# Patient Record
Sex: MALE | Race: WHITE | NOT HISPANIC OR LATINO | Employment: FULL TIME | URBAN - METROPOLITAN AREA
[De-identification: names, ages, dates, MRNs, and addresses within clinical notes are randomized per-mention and may not be internally consistent; named-entity substitution may affect disease eponyms.]

---

## 2017-02-13 ENCOUNTER — OFFICE VISIT (OUTPATIENT)
Dept: FAMILY MEDICINE | Facility: CLINIC | Age: 37
End: 2017-02-13
Payer: COMMERCIAL

## 2017-02-13 VITALS
BODY MASS INDEX: 27.57 KG/M2 | HEIGHT: 73 IN | TEMPERATURE: 98.8 F | DIASTOLIC BLOOD PRESSURE: 86 MMHG | HEART RATE: 94 BPM | WEIGHT: 208 LBS | SYSTOLIC BLOOD PRESSURE: 120 MMHG

## 2017-02-13 DIAGNOSIS — F32.1 MODERATE SINGLE CURRENT EPISODE OF MAJOR DEPRESSIVE DISORDER (H): ICD-10-CM

## 2017-02-13 DIAGNOSIS — M62.81 MUSCLE WEAKNESS: Primary | ICD-10-CM

## 2017-02-13 LAB
BASOPHILS # BLD AUTO: 0 10E9/L (ref 0–0.2)
BASOPHILS NFR BLD AUTO: 0.2 %
DIFFERENTIAL METHOD BLD: NORMAL
EOSINOPHIL # BLD AUTO: 0.1 10E9/L (ref 0–0.7)
EOSINOPHIL NFR BLD AUTO: 1.2 %
ERYTHROCYTE [DISTWIDTH] IN BLOOD BY AUTOMATED COUNT: 12.1 % (ref 10–15)
HCT VFR BLD AUTO: 46.1 % (ref 40–53)
HGB BLD-MCNC: 16.7 G/DL (ref 13.3–17.7)
LYMPHOCYTES # BLD AUTO: 1.8 10E9/L (ref 0.8–5.3)
LYMPHOCYTES NFR BLD AUTO: 32 %
MCH RBC QN AUTO: 30.9 PG (ref 26.5–33)
MCHC RBC AUTO-ENTMCNC: 36.2 G/DL (ref 31.5–36.5)
MCV RBC AUTO: 85 FL (ref 78–100)
MONOCYTES # BLD AUTO: 0.4 10E9/L (ref 0–1.3)
MONOCYTES NFR BLD AUTO: 6.7 %
NEUTROPHILS # BLD AUTO: 3.4 10E9/L (ref 1.6–8.3)
NEUTROPHILS NFR BLD AUTO: 59.9 %
PLATELET # BLD AUTO: 222 10E9/L (ref 150–450)
RBC # BLD AUTO: 5.41 10E12/L (ref 4.4–5.9)
VIT B12 SERPL-MCNC: 905 PG/ML (ref 193–986)
WBC # BLD AUTO: 5.7 10E9/L (ref 4–11)

## 2017-02-13 PROCEDURE — 82306 VITAMIN D 25 HYDROXY: CPT | Performed by: PHYSICIAN ASSISTANT

## 2017-02-13 PROCEDURE — 82607 VITAMIN B-12: CPT | Performed by: PHYSICIAN ASSISTANT

## 2017-02-13 PROCEDURE — 99213 OFFICE O/P EST LOW 20 MIN: CPT | Performed by: PHYSICIAN ASSISTANT

## 2017-02-13 PROCEDURE — 36415 COLL VENOUS BLD VENIPUNCTURE: CPT | Performed by: PHYSICIAN ASSISTANT

## 2017-02-13 PROCEDURE — 82550 ASSAY OF CK (CPK): CPT | Performed by: PHYSICIAN ASSISTANT

## 2017-02-13 PROCEDURE — 80050 GENERAL HEALTH PANEL: CPT | Performed by: PHYSICIAN ASSISTANT

## 2017-02-13 NOTE — MR AVS SNAPSHOT
"              After Visit Summary   2017    Rosendo Dallas    MRN: [id number]           Patient Information     Date Of Birth          1980        Visit Information        Provider Department      2017 11:20 AM Anand Mcclendon PA-C [facility]        Today's Diagnoses     Muscle weakness    -  1       Follow-ups after your visit        Follow-up notes from your care team     Return if symptoms worsen or fail to improve.      Who to contact     If you have questions or need follow up information about today's clinic visit or your schedule please contact [facility] directly at [phone].  Normal or non-critical lab and imaging results will be communicated to you by [employer], letter or phone within 4 business days after the clinic has received the results. If you do not hear from us within 7 days, please contact the clinic through Prov[employer] or phone. If you have a critical or abnormal lab result, we will notify you by phone as soon as possible.  Submit refill requests through [employer] or call your pharmacy and they will forward the refill request to us. Please allow 3 business days for your refill to be completed.          Additional Information About Your Visit        MyChart Information     [employer] lets you send messages to your doctor, view your test results, renew your prescriptions, schedule appointments and more. To sign up, go to [email or web address] . Click on \"Log in\" on the left side of the screen, which will take you to the Welcome page. Then click on \"Sign up Now\" on the right side of the page.     You will be asked to enter the access code listed below, as well as some personal information. Please follow the directions to create your username and password.     Your access code is: [email or web address]  Expires: 2017 12:43 PM     Your access code will  in 90 days. If you need help or a new code, please call your [facility] or " "[phone].        Care EveryWhere ID     This is your Care EveryWhere ID. This could be used by other organizations to access your [place] medical records  [id number]        Your Vitals Were     Pulse Temperature Height BMI (Body Mass Index)          94 98.8  F (37.1  C) (Tympanic) 6' 1\" (1.854 m) 27.44 kg/m2         Blood Pressure from Last 3 Encounters:   02/13/17 120/86   07/07/16 120/82   06/20/16 124/84    Weight from Last 3 Encounters:   02/13/17 208 lb (94.3 kg)   07/07/16 207 lb 8 oz (94.1 kg)   06/20/16 213 lb (96.6 kg)              We Performed the Following     CBC with platelets differential     CK total     Comprehensive metabolic panel (BMP + Alb, Alk Phos, ALT, AST, Total. Bili, TP)     TSH with free T4 reflex     Vitamin B12        Primary Care Provider    None Specified       No primary provider on file.        Thank you!     Thank you for choosing [facility]  for your care. Our goal is always to provide you with excellent care. Hearing back from our patients is one way we can continue to improve our services. Please take a few minutes to complete the written survey that you may receive in the mail after your visit with us. Thank you!             Your Updated Medication List - Protect others around you: Learn how to safely use, store and throw away your medicines at www.disposemymeds.org.          This list is accurate as of: 2/13/17 12:43 PM.  Always use your most recent med list.                   Brand Name Dispense Instructions for use    omeprazole 10 MG CR capsule    priLOSEC    60 capsule    Take by mouth 30-60 minutes before a meal.         "

## 2017-02-13 NOTE — NURSING NOTE
"Chief Complaint   Patient presents with     Decreased Co-ordination     x 1 week     Initial /86 (BP Location: Right arm, Patient Position: Chair, Cuff Size: Adult Large)  Pulse 94  Temp 98.8  F (37.1  C) (Tympanic)  Ht 6' 1\" (1.854 m)  Wt 208 lb (94.3 kg)  BMI 27.44 kg/m2 Estimated body mass index is 27.44 kg/(m^2) as calculated from the following:    Height as of this encounter: 6' 1\" (1.854 m).    Weight as of this encounter: 208 lb (94.3 kg).  BP completed using cuff size large right Arm  [facility] CMA    "

## 2017-02-13 NOTE — PROGRESS NOTES
"  SUBJECTIVE:                                                    Rosendo Dallas is a 36 year old male who presents to clinic today for the following health issues:    Muscle/Motor function problem x 1 week - Motor skills more difficult, muscles in his hands and arms feel \"stiff\" and \" weak\" bilaterally. He describes that it feels difficult to grasp with his hands.  He has some muscle pain in his bilateral LE and some tingling bilaterally in his 4th and 5th toes.  This has been ongoing x 1 week.  No new changes to medications, no recent illnesses, drug ETOH or vaccinations. No recent travel.  He has no headaches, fatigue, changes in vision.  No other symptoms.              Problem list and histories reviewed & adjusted, as indicated.  Additional history: as documented    Patient Active Problem List   Diagnosis   (none) - all problems resolved or deleted     History reviewed. No pertinent past surgical history.    Social History   Substance Use Topics     Smoking status: Never Smoker     Smokeless tobacco: Not on file     Alcohol use Yes      Comment: occassional     History reviewed. No pertinent family history.      Current Outpatient Prescriptions   Medication Sig Dispense Refill     omeprazole (PRILOSEC) 10 MG capsule Take by mouth 30-60 minutes before a meal. 60 capsule      No Known Allergies    ROS:  Constitutional, HEENT, cardiovascular, pulmonary, gi and gu systems are negative, except as otherwise noted.    OBJECTIVE:                                                    /86 (BP Location: Right arm, Patient Position: Chair, Cuff Size: Adult Large)  Pulse 94  Temp 98.8  F (37.1  C) (Tympanic)  Ht 6' 1\" (1.854 m)  Wt 208 lb (94.3 kg)  BMI 27.44 kg/m2  Body mass index is 27.44 kg/(m^2).  GENERAL: healthy, alert and no distress  EYES: Eyes grossly normal to inspection, PERRL and conjunctivae and sclerae normal, EOMI  NECK: no adenopathy, no asymmetry, masses, or scars and thyroid normal to palpation  RESP: " lungs clear to auscultation - no rales, rhonchi or wheezes  CV: regular rate and rhythm, normal S1 S2, no S3 or S4, no murmur  MS: no gross musculoskeletal defects noted, no edema  NEURO: Normal strength and tone, mentation intact and speech normal, FROM and 5/5 strength of UE bilaterally, finger to nose, heel to shin and Bebeto intact bilaterally  PSYCH: mentation appears normal, affect normal/bright    Diagnostic Test Results:  none      ASSESSMENT/PLAN:                                                      1. Muscle weakness  Unclear etiology, neuro examination is intact today, will begin with labs to assess etiology, may consider imaging or neuro eval pending results.  - TSH with free T4 reflex  - Vitamin B12  - Comprehensive metabolic panel (BMP + Alb, Alk Phos, ALT, AST, Total. Bili, TP)  - CBC with platelets differential  - CK total    See Patient Instructions    Anand Mcclendon PA-C  [facility]

## 2017-02-14 ENCOUNTER — TELEPHONE (OUTPATIENT)
Dept: FAMILY MEDICINE | Facility: CLINIC | Age: 37
End: 2017-02-14

## 2017-02-14 LAB
ALBUMIN SERPL-MCNC: 4.5 G/DL (ref 3.4–5)
ALP SERPL-CCNC: 70 U/L (ref 40–150)
ALT SERPL W P-5'-P-CCNC: 53 U/L (ref 0–70)
ANION GAP SERPL CALCULATED.3IONS-SCNC: 11 MMOL/L (ref 3–14)
AST SERPL W P-5'-P-CCNC: 26 U/L (ref 0–45)
BILIRUB SERPL-MCNC: 1.3 MG/DL (ref 0.2–1.3)
BUN SERPL-MCNC: 16 MG/DL (ref 7–30)
CALCIUM SERPL-MCNC: 9.3 MG/DL (ref 8.5–10.1)
CHLORIDE SERPL-SCNC: 102 MMOL/L (ref 94–109)
CK SERPL-CCNC: 126 U/L (ref 30–300)
CO2 SERPL-SCNC: 26 MMOL/L (ref 20–32)
CREAT SERPL-MCNC: 0.89 MG/DL (ref 0.66–1.25)
GFR SERPL CREATININE-BSD FRML MDRD: ABNORMAL ML/MIN/1.7M2
GLUCOSE SERPL-MCNC: 104 MG/DL (ref 70–99)
POTASSIUM SERPL-SCNC: 3.9 MMOL/L (ref 3.4–5.3)
PROT SERPL-MCNC: 7.6 G/DL (ref 6.8–8.8)
SODIUM SERPL-SCNC: 139 MMOL/L (ref 133–144)
TSH SERPL DL<=0.005 MIU/L-ACNC: 0.95 MU/L (ref 0.4–4)

## 2017-02-14 NOTE — TELEPHONE ENCOUNTER
Spoke with patient and informed of below. Patient verbalized understanding and agrees with plan.    Marisa Holbrook RN   [facility] - Triage

## 2017-02-14 NOTE — TELEPHONE ENCOUNTER
Please call patient and inform him of normal lab results ( below).  The lab results did not reveal a specific cause to his symptoms.  At this time, Advise that he monitor his symptoms and if continuing , will consider neurology referral vs. Hand therapy.  Please let me know if he has any questions

## 2017-02-16 ENCOUNTER — OFFICE VISIT (OUTPATIENT)
Dept: FAMILY MEDICINE | Facility: CLINIC | Age: 37
End: 2017-02-16
Payer: COMMERCIAL

## 2017-02-16 ENCOUNTER — TELEPHONE (OUTPATIENT)
Dept: FAMILY MEDICINE | Facility: CLINIC | Age: 37
End: 2017-02-16

## 2017-02-16 VITALS
TEMPERATURE: 97.8 F | WEIGHT: 204.3 LBS | HEART RATE: 65 BPM | OXYGEN SATURATION: 100 % | SYSTOLIC BLOOD PRESSURE: 114 MMHG | DIASTOLIC BLOOD PRESSURE: 88 MMHG | BODY MASS INDEX: 26.95 KG/M2

## 2017-02-16 DIAGNOSIS — F41.9 ANXIETY: Primary | ICD-10-CM

## 2017-02-16 DIAGNOSIS — M79.10 MUSCLE PAIN: ICD-10-CM

## 2017-02-16 DIAGNOSIS — E55.9 VITAMIN D DEFICIENCY: ICD-10-CM

## 2017-02-16 DIAGNOSIS — F32.1 MODERATE SINGLE CURRENT EPISODE OF MAJOR DEPRESSIVE DISORDER (H): ICD-10-CM

## 2017-02-16 LAB — DEPRECATED CALCIDIOL+CALCIFEROL SERPL-MC: 15 UG/L (ref 20–75)

## 2017-02-16 PROCEDURE — 99214 OFFICE O/P EST MOD 30 MIN: CPT | Performed by: FAMILY MEDICINE

## 2017-02-16 RX ORDER — ESCITALOPRAM OXALATE 10 MG/1
10 TABLET ORAL DAILY
Qty: 30 TABLET | Refills: 1 | Status: SHIPPED | OUTPATIENT
Start: 2017-02-16 | End: 2017-03-13

## 2017-02-16 RX ORDER — HYDROXYZINE HYDROCHLORIDE 25 MG/1
25-50 TABLET, FILM COATED ORAL
Qty: 60 TABLET | Refills: 1 | Status: SHIPPED | OUTPATIENT
Start: 2017-02-16 | End: 2017-03-13

## 2017-02-16 ASSESSMENT — ANXIETY QUESTIONNAIRES
2. NOT BEING ABLE TO STOP OR CONTROL WORRYING: NEARLY EVERY DAY
5. BEING SO RESTLESS THAT IT IS HARD TO SIT STILL: NEARLY EVERY DAY
GAD7 TOTAL SCORE: 21
IF YOU CHECKED OFF ANY PROBLEMS ON THIS QUESTIONNAIRE, HOW DIFFICULT HAVE THESE PROBLEMS MADE IT FOR YOU TO DO YOUR WORK, TAKE CARE OF THINGS AT HOME, OR GET ALONG WITH OTHER PEOPLE: EXTREMELY DIFFICULT
7. FEELING AFRAID AS IF SOMETHING AWFUL MIGHT HAPPEN: NEARLY EVERY DAY
3. WORRYING TOO MUCH ABOUT DIFFERENT THINGS: NEARLY EVERY DAY
6. BECOMING EASILY ANNOYED OR IRRITABLE: NEARLY EVERY DAY
1. FEELING NERVOUS, ANXIOUS, OR ON EDGE: NEARLY EVERY DAY

## 2017-02-16 ASSESSMENT — PATIENT HEALTH QUESTIONNAIRE - PHQ9: 5. POOR APPETITE OR OVEREATING: NEARLY EVERY DAY

## 2017-02-16 NOTE — PATIENT INSTRUCTIONS
Begin Lexapro 10 mg daily in AM  Hydroxyzine 25-50 mg every 4-6 hours as needed for anxiety.  I would recommend you take at night to assist with sleep until sleeping well.      Follow up in 4 weeks for recheck of symptoms, sooner if worsening symptoms.  If any of the muscle symptoms remain after the anxiety symptoms are resolved we will discuss additional evaluation.

## 2017-02-16 NOTE — NURSING NOTE
"Chief Complaint   Patient presents with     Anxiety       Initial /88 (BP Location: Right arm, Cuff Size: Adult Large)  Pulse 65  Temp 97.8  F (36.6  C) (Oral)  Wt 92.7 kg (204 lb 4.8 oz)  SpO2 100%  BMI 26.95 kg/m2 Estimated body mass index is 26.95 kg/(m^2) as calculated from the following:    Height as of 2/13/17: 1.854 m (6' 1\").    Weight as of this encounter: 92.7 kg (204 lb 4.8 oz).  Medication Reconciliation: complete     Jhon Waller CMA      "

## 2017-02-16 NOTE — PROGRESS NOTES
Looks like this was ordered by you today at follow up with this patient. Let me know if you would like me to follow up with him regarding this

## 2017-02-16 NOTE — PROGRESS NOTES
SUBJECTIVE:                                                    Rosendo Dallas is a 36 year old male who presents to clinic today for the following health issues:      Abnormal Mood Symptoms     Onset: 2 week  - work stress increased.      Description:   Depression: no   Anxiety: YES    Accompanying Signs & Symptoms:  Still participating in activities that you used to enjoy: YES, trying to as much as possible  Fatigue: YES  Irritability: YES  Difficulty concentrating: YES  Changes in appetite: YES- not eating enough - decreased appetite  Problems with sleep: YES- disturbances in sleep  Heart racing/beating fast : YES - during anxiety episodes.    Thoughts of hurting yourself or others: none     History:   Recent stress: YES- work and health  Prior depression hospitalization: None  Family history of depression: YES  Family history of anxiety: YES      Precipitating factors:   Alcohol/drug use: YES- alcohol socially    Alleviating factors:  None       Therapies Tried and outcome: None    Hand muscle stiffness - texting, remote control painful.  Tightness in arms, calves.  Very anxious.      No joint swelling.  No weakness.  Aching present constantly upper arms.    Sleep poor in last week.  Hard to stay asleep.    headache on Tues/Wed but not since then        Problem list and histories reviewed & adjusted, as indicated.  Additional history: as documented    Problem list, Medication list, Allergies, and Medical/Social/Surgical histories reviewed in EPIC and updated as appropriate.    ROS:  Constitutional, HEENT, cardiovascular, pulmonary, gi and gu systems are negative, except as otherwise noted.    OBJECTIVE:                                                    /88 (BP Location: Right arm, Cuff Size: Adult Large)  Pulse 65  Temp 97.8  F (36.6  C) (Oral)  Wt 92.7 kg (204 lb 4.8 oz)  SpO2 100%  BMI 26.95 kg/m2  Body mass index is 26.95 kg/(m^2).  GENERAL: alert, no distress and fatigued  EYES: Eyes grossly normal to  inspection, PERRL and conjunctivae and sclerae normal  HENT: ear canals and TM's normal, nose and mouth without ulcers or lesions  NECK: no adenopathy, no asymmetry, masses, or scars and thyroid normal to palpation  RESP: lungs clear to auscultation - no rales, rhonchi or wheezes  CV: regular rate and rhythm, normal S1 S2, no S3 or S4, no murmur, click or rub, no peripheral edema and peripheral pulses strong  ABDOMEN: soft, nontender, no hepatosplenomegaly, no masses and bowel sounds normal  MS: no gross musculoskeletal defects noted, no edema  SKIN: no suspicious lesions or rashes  NEURO: Normal strength and tone, sensory exam grossly normal, mentation intact, speech normal, cranial nerves 2-12 intact, DTR's normal and symmetric, gait normal including heel/toe/tandem walking, Romberg normal and rapid alternating movements normal  PSYCH: mentation appears normal, affect flat, anxious, fatigued, judgement and insight intact and appearance well groomed    Component Results   Component Value Flag Ref Range Units Status Collected Lab   Vitamin D Deficiency screening 15 (L) 20 - 75 ug/L Final 02/13/2017 11:48 AM 51   Comment:   Season, race, dietary intake, and treatment affect the concentration of    25-hydroxy-Vitamin D. Values may decrease during winter months and increase    during summer months. Values 20-29 ug/L may indicate Vitamin D insufficiency    and values <20 ug/L may indicate Vitamin D deficiency.    Vitamin D determination is routinely performed by an immunoassay specific for    25 hydroxyvitamin D3.  If an individual is on vitamin D2 (ergocalciferol)    supplementation, please specify 25 OH vitamin D2 and D3 level determination   by    LCMSMS test [id number].             ASSESSMENT/PLAN:                                                        1. Anxiety  May be contributed to by the low vitamin D.  Physical symptoms result in anxiety for him due to fear of health issues. Counseling recommended.    -  escitalopram (LEXAPRO) 10 MG tablet; Take 1 tablet (10 mg) by mouth daily  Dispense: 30 tablet; Refill: 1  - hydrOXYzine (ATARAX) 25 MG tablet; Take 1-2 tablets (25-50 mg) by mouth nightly as needed for anxiety (insomnia)  Dispense: 60 tablet; Refill: 1  - MENTAL HEALTH REFERRAL    2. Moderate single current episode of major depressive disorder (H)  Vitamin D may be contributing.    - escitalopram (LEXAPRO) 10 MG tablet; Take 1 tablet (10 mg) by mouth daily  Dispense: 30 tablet; Refill: 1  - MENTAL HEALTH REFERRAL  - Vitamin D Deficiency; Future    3. Vitamin D deficiency  - vitamin D (ERGOCALCIFEROL) 62448 UNIT capsule; Take 1 capsule (50,000 Units) by mouth every 7 days for 8 doses  Dispense: 8 capsule; Refill: 0    4. Muscle pain  Follow up in 4 weeks to assess response to medication and any residual symptoms.       Patient Instructions   Begin Lexapro 10 mg daily in AM  Hydroxyzine 25-50 mg every 4-6 hours as needed for anxiety.  I would recommend you take at night to assist with sleep until sleeping well.      Follow up in 4 weeks for recheck of symptoms, sooner if worsening symptoms.  If any of the muscle symptoms remain after the anxiety symptoms are resolved we will discuss additional evaluation.      Dianne Gallegos MD  [facility]

## 2017-02-16 NOTE — MR AVS SNAPSHOT
"                  MRN:[id number]                      After Visit Summary   2017    Rosendo Dallas    MRN: [id number]           Visit Information        Provider Department      2017 9:40 AM Dianne Gallegos MD [facility] Instructions    Begin Lexapro 10 mg daily in AM  Hydroxyzine 25-50 mg every 4-6 hours as needed for anxiety.  I would recommend you take at night to assist with sleep until sleeping well.      Follow up in 4 weeks for recheck of symptoms, sooner if worsening symptoms.  If any of the muscle symptoms remain after the anxiety symptoms are resolved we will discuss additional evaluation.       [email or web address] Information     [employer] lets you send messages to your doctor, view your test results, renew your prescriptions, schedule appointments and more. To sign up, go to [email or web address] . Click on \"Log in\" on the left side of the screen, which will take you to the Welcome page. Then click on \"Sign up Now\" on the right side of the page.     You will be asked to enter the access code listed below, as well as some personal information. Please follow the directions to create your username and password.     Your access code is: [email or web address]  Expires: 2017 12:43 PM     Your access code will  in 90 days. If you need help or a new code, please call your [place] clinic or [phone].        Care EveryWhere ID     This is your Care EveryWhere ID. This could be used by other organizations to access your [place] medical records  [id number]        "

## 2017-02-17 ENCOUNTER — TELEPHONE (OUTPATIENT)
Dept: FAMILY MEDICINE | Facility: CLINIC | Age: 37
End: 2017-02-17

## 2017-02-17 DIAGNOSIS — E55.9 VITAMIN D DEFICIENCY: Primary | ICD-10-CM

## 2017-02-17 PROBLEM — M79.10 MUSCLE PAIN: Status: ACTIVE | Noted: 2017-02-17

## 2017-02-17 RX ORDER — ERGOCALCIFEROL 1.25 MG/1
50000 CAPSULE, LIQUID FILLED ORAL
Qty: 8 CAPSULE | Refills: 0 | Status: SHIPPED | OUTPATIENT
Start: 2017-02-17 | End: 2017-04-08

## 2017-02-17 ASSESSMENT — ANXIETY QUESTIONNAIRES: GAD7 TOTAL SCORE: 21

## 2017-02-17 ASSESSMENT — PATIENT HEALTH QUESTIONNAIRE - PHQ9: SUM OF ALL RESPONSES TO PHQ QUESTIONS 1-9: 21

## 2017-02-17 NOTE — TELEPHONE ENCOUNTER
Please call patient re:  Results vitamin D   See letter copied below.          Below you will find your recent laboratory results.  Your vitamin D level is very low.  This can contribute to symptoms of fatigue, depression, and muscle pain.  It is less commonly associated with anxiety but replacement of the vitamin is definitely recommended.    I would recommend an increase in vitamin D supplement to 50,000  IU weekly for 8 weeks and then  2000 IU daily.   The 50,000 IU dosing prescription is sent to your pharmacy.  The 2000 IU dose can be obtained over the counter of as a prescription.  If you desire a prescription, please call the office and this will be sent to your pharmacy.   Recheck of this is needed in 4 months.  Please call or [employer] message me if you have any questions.    Vitamin D Deficiency screening 20 - 75 ug/L 15 (L)     Comments: Season, race, dietary intake, and treatment affect the concentration of    25-hydroxy-Vitamin D. Values may decrease during winter months and increase    during summer months. Values 20-29 ug/L may indicate Vitamin D insufficiency    and values <20 ug/L may indicate Vitamin D deficiency.    Vitamin D determination is routinely performed by an immunoassay specific for    25 hydroxyvitamin D3.  If an individual is on vitamin D2 (ergocalciferol)    supplementation, please specify 25 OH vitamin D2 and D3 level determination   by    LCMSMS test [id number].        Resulting Agency  [facility]          Sincerely,         Dianne Gallegos MD

## 2017-02-17 NOTE — TELEPHONE ENCOUNTER
Patient was informed of test results and recommendations from provider.   Patient verbalized understanding of all information given. Advised to make lab only appt for 4 month recheck.  Jose Marina RN

## 2017-02-17 NOTE — LETTER
[address]  [address]  [phone]    February 17, 2017      Rosendo Dallas  [address]  [place] [address]              Dear Rosendo,      Below you will find your recent laboratory results.  Your vitamin D level is very low.  This can contribute to symptoms of fatigue, depression, and muscle pain.  It is less commonly associated with anxiety but replacement of the vitamin is definitely recommended.  I would recommend an increase in vitamin D supplement to 50,000  IU weekly for 8 weeks and then  2000 IU daily.   The 50,000 IU dosing prescription is sent to your pharmacy.  The 2000 IU dose can be obtained over the counter of as a prescription.  If you desire a prescription, please call the office and this will be sent to your pharmacy.   Recheck of this is needed in 4 months.  Please call or MyChart message me if you have any questions.    Vitamin D Deficiency screening 20 - 75 ug/L 15 (L)     Comments: Season, race, dietary intake, and treatment affect the concentration of    25-hydroxy-Vitamin D. Values may decrease during winter months and increase    during summer months. Values 20-29 ug/L may indicate Vitamin D insufficiency    and values <20 ug/L may indicate Vitamin D deficiency.    Vitamin D determination is routinely performed by an immunoassay specific for    25 hydroxyvitamin D3.  If an individual is on vitamin D2 (ergocalciferol)    supplementation, please specify 25 OH vitamin D2 and D3 level determination   by    LCMSMS test [id number].        Resulting Agency  [facility]          Sincerely,         Dianne Gallegos MD

## 2017-02-20 ENCOUNTER — TELEPHONE (OUTPATIENT)
Dept: FAMILY MEDICINE | Facility: CLINIC | Age: 37
End: 2017-02-20

## 2017-02-20 NOTE — TELEPHONE ENCOUNTER
----- Message from Dianne Gallegos MD sent at 2/17/2017  9:29 AM CST -----  Please call patient re:  New start lexapro for depression/anxiety.  Verify has follow up appointment for 4 weeks.  MYRANDAK

## 2017-02-20 NOTE — TELEPHONE ENCOUNTER
This writer attempted to contact Rosendo on 02/20/17.    Was call answered?  No.  Left message on voicemail with information to call me back.    If patient calls back, please Contact Clinic RN team. If no one available, send encounter message    Amy Neri

## 2017-02-28 ENCOUNTER — MYC MEDICAL ADVICE (OUTPATIENT)
Dept: FAMILY MEDICINE | Facility: CLINIC | Age: 37
End: 2017-02-28

## 2017-02-28 DIAGNOSIS — M79.622 PAIN OF LEFT UPPER ARM: Primary | ICD-10-CM

## 2017-03-06 NOTE — TELEPHONE ENCOUNTER
Please call patient re:  Message returned with [employer] not read.  Okay to leave a message that he has a message on [employer].    MYRANDAK

## 2017-03-06 NOTE — TELEPHONE ENCOUNTER
This writer attempted to contact Rosendo on 03/06/17.    Was call answered?  No.  Left message on voicemail with information to call me back.    If patient calls back, please Contact Clinic RN team. If no one available, send encounter message    Denise Gamboa RN

## 2017-03-07 NOTE — TELEPHONE ENCOUNTER
"Pt has not read the message. Message read by this nurse.  Pt states that the pain is now localized to left hand. \"When I am extending my index finger it feels like it is strained; more like overuse injury.\" Pt would like to wait until his f/u apt.  Miladis Oconnor RN    "

## 2017-03-12 PROBLEM — F32.1 MAJOR DEPRESSIVE DISORDER, SINGLE EPISODE, MODERATE (H): Status: ACTIVE | Noted: 2017-03-12

## 2017-03-12 PROBLEM — F41.9 ANXIETY: Status: ACTIVE | Noted: 2017-03-12

## 2017-03-13 ENCOUNTER — OFFICE VISIT (OUTPATIENT)
Dept: FAMILY MEDICINE | Facility: CLINIC | Age: 37
End: 2017-03-13
Payer: COMMERCIAL

## 2017-03-13 VITALS
HEART RATE: 70 BPM | WEIGHT: 210.4 LBS | OXYGEN SATURATION: 95 % | TEMPERATURE: 98.3 F | SYSTOLIC BLOOD PRESSURE: 124 MMHG | DIASTOLIC BLOOD PRESSURE: 86 MMHG | BODY MASS INDEX: 27.76 KG/M2

## 2017-03-13 DIAGNOSIS — E55.9 VITAMIN D DEFICIENCY: ICD-10-CM

## 2017-03-13 DIAGNOSIS — M79.10 MUSCLE PAIN: ICD-10-CM

## 2017-03-13 DIAGNOSIS — M79.645 PAIN OF FINGER OF LEFT HAND: ICD-10-CM

## 2017-03-13 DIAGNOSIS — F41.9 ANXIETY: Primary | ICD-10-CM

## 2017-03-13 DIAGNOSIS — F32.1 MODERATE SINGLE CURRENT EPISODE OF MAJOR DEPRESSIVE DISORDER (H): ICD-10-CM

## 2017-03-13 PROCEDURE — 99214 OFFICE O/P EST MOD 30 MIN: CPT | Performed by: FAMILY MEDICINE

## 2017-03-13 RX ORDER — ESCITALOPRAM OXALATE 10 MG/1
10 TABLET ORAL DAILY
Qty: 90 TABLET | Refills: 1 | Status: SHIPPED | OUTPATIENT
Start: 2017-03-13 | End: 2017-10-23

## 2017-03-13 ASSESSMENT — ANXIETY QUESTIONNAIRES
GAD7 TOTAL SCORE: 3
3. WORRYING TOO MUCH ABOUT DIFFERENT THINGS: NOT AT ALL
2. NOT BEING ABLE TO STOP OR CONTROL WORRYING: NOT AT ALL
5. BEING SO RESTLESS THAT IT IS HARD TO SIT STILL: SEVERAL DAYS
7. FEELING AFRAID AS IF SOMETHING AWFUL MIGHT HAPPEN: NOT AT ALL
1. FEELING NERVOUS, ANXIOUS, OR ON EDGE: SEVERAL DAYS
6. BECOMING EASILY ANNOYED OR IRRITABLE: NOT AT ALL

## 2017-03-13 ASSESSMENT — PATIENT HEALTH QUESTIONNAIRE - PHQ9: 5. POOR APPETITE OR OVEREATING: SEVERAL DAYS

## 2017-03-13 NOTE — MR AVS SNAPSHOT
After Visit Summary   3/13/2017    Rosendo Dallas    MRN: [id number]           Patient Information     Date Of Birth          1980        Visit Information        Provider Department      3/13/2017 9:20 AM Dianne Gallegos MD [facility]        Today's Diagnoses     Anxiety        Moderate single current episode of major depressive disorder (H)          Care Instructions    Continue lexapro - refills sent.  Follow up in 6 months via Evisit, phone visit or office visit.    Complete the weekly vitamin D then transition to over the counter vitamin D 2000 IU daily          Follow-ups after your visit        Who to contact     If you have questions or need follow up information about today's clinic visit or your schedule please contact [facility] directly at [phone].  Normal or non-critical lab and imaging results will be communicated to you by MyChart, letter or phone within 4 business days after the clinic has received the results. If you do not hear from us within 7 days, please contact the clinic through [facility] or phone. If you have a critical or abnormal lab result, we will notify you by phone as soon as possible.  Submit refill requests through [employer] or call your pharmacy and they will forward the refill request to us. Please allow 3 business days for your refill to be completed.          Additional Information About Your Visit        MyChart Information     F[employer]ored gives you secure access to your electronic health record. If you see a primary care provider, you can also send messages to your care team and make appointments. If you have questions, please call your primary care clinic.  If you do not have a primary care provider, please call [phone] and they will assist you.        Care EveryWhere ID     This is your Care EveryWhere ID. This could be used by other organizations to access your [place] medical records  [id number]        Your Vitals Were      Pulse Temperature Pulse Oximetry BMI (Body Mass Index)          70 98.3  F (36.8  C) (Oral) 95% 27.76 kg/m2         Blood Pressure from Last 3 Encounters:   03/13/17 124/86   02/16/17 114/88   02/13/17 120/86    Weight from Last 3 Encounters:   03/13/17 95.4 kg (210 lb 6.4 oz)   02/16/17 92.7 kg (204 lb 4.8 oz)   02/13/17 94.3 kg (208 lb)              Today, you had the following     No orders found for display         Where to get your medicines      These medications were sent to [id number] - [place], [place] - [address] AT [facility] [address]  [address], [place] [address]     Phone:  [phone]     escitalopram 10 MG tablet          Primary Care Provider Office Phone #    [facility] [phone]       No address on file        Thank you!     Thank you for choosing [facility]  for your care. Our goal is always to provide you with excellent care. Hearing back from our patients is one way we can continue to improve our services. Please take a few minutes to complete the written survey that you may receive in the mail after your visit with us. Thank you!             Your Updated Medication List - Protect others around you: Learn how to safely use, store and throw away your medicines at www.disposemymeds.org.          This list is accurate as of: 3/13/17  9:38 AM.  Always use your most recent med list.                   Brand Name Dispense Instructions for use    escitalopram 10 MG tablet    LEXAPRO    90 tablet    Take 1 tablet (10 mg) by mouth daily       omeprazole 10 MG CR capsule    priLOSEC    60 capsule    Take by mouth 30-60 minutes before a meal.       vitamin D 14767 UNIT capsule    ERGOCALCIFEROL    8 capsule    Take 1 capsule (50,000 Units) by mouth every 7 days for 8 doses

## 2017-03-13 NOTE — NURSING NOTE
"Chief Complaint   Patient presents with     Depression     Anxiety       Initial /86 (BP Location: Right arm, Cuff Size: Adult Large)  Pulse 70  Temp 98.3  F (36.8  C) (Oral)  Wt 95.4 kg (210 lb 6.4 oz)  SpO2 95%  BMI 27.76 kg/m2 Estimated body mass index is 27.76 kg/(m^2) as calculated from the following:    Height as of 2/13/17: 1.854 m (6' 1\").    Weight as of this encounter: 95.4 kg (210 lb 6.4 oz).  Medication Reconciliation: complete       Jhon Waller CMA      "

## 2017-03-13 NOTE — PROGRESS NOTES
SUBJECTIVE:                                                    Rosendo Dallas is a 36 year old male who presents to clinic today for the following health issues:        Depression and Anxiety Follow-Up    Status since last visit: Improved     Other associated symptoms:None    Complicating factors:     Significant life event: No     Current substance abuse: None    PHQ-9 SCORE 2/16/2017 3/13/2017   Total Score 21 2     VIET-7 SCORE 2/16/2017 3/13/2017   Total Score 21 3     Initially poor appetite, nausea.     PHQ-9  English      PHQ-9   Any Language     GAD7       Amount of exercise or physical activity: 6-7 days/week for an average of 45-60 minutes    Problems taking medications regularly: No    Medication side effects: none  Diet: regular (no restrictions)    Muscle pain - improved - resolved with exception of discomfort in the left index finger with full extension. No weakness no numbness.  FROM.  Realized he uses this finger to steady his phone which he uses for work consistently throughout the day.      Problem list and histories reviewed & adjusted, as indicated.  Additional history: as documented    BP Readings from Last 3 Encounters:   03/13/17 124/86   02/16/17 114/88   02/13/17 120/86    Wt Readings from Last 3 Encounters:   03/13/17 95.4 kg (210 lb 6.4 oz)   02/16/17 92.7 kg (204 lb 4.8 oz)   02/13/17 94.3 kg (208 lb)                    Reviewed and updated as needed this visit by clinical staff  Tobacco  Allergies  Meds  Problems  Med Hx  Surg Hx  Fam Hx  Soc Hx        Reviewed and updated as needed this visit by Provider  Tobacco  Allergies  Meds  Problems  Med Hx  Surg Hx  Fam Hx  Soc Hx          ROS:  Constitutional, HEENT, cardiovascular, pulmonary, gi and gu systems are negative, except as otherwise noted.    OBJECTIVE:                                                    /86 (BP Location: Right arm, Cuff Size: Adult Large)  Pulse 70  Temp 98.3  F (36.8  C) (Oral)  Wt 95.4 kg (210  "lb 6.4 oz)  SpO2 95%  BMI 27.76 kg/m2  Body mass index is 27.76 kg/(m^2).  GENERAL: alert, no distress and over weight  NECK: no adenopathy, no asymmetry, masses, or scars and thyroid normal to palpation  RESP: lungs clear to auscultation - no rales, rhonchi or wheezes  CV: regular rate and rhythm, normal S1 S2, no S3 or S4, no murmur, click or rub, no peripheral edema and peripheral pulses strong  MS: LUE exam shows normal strength and muscle mass, no deformities, no erythema, induration, or nodules, no evidence of joint effusion, ROM of all joints is normal, no evidence of joint instability and reports discomfort on full extension only.   SKIN: no suspicious lesions or rashes  PSYCH: mentation appears normal, affect normal/bright, judgement and insight intact and appearance well groomed         ASSESSMENT/PLAN:                                                        BMI:   Estimated body mass index is 27.76 kg/(m^2) as calculated from the following:    Height as of 2/13/17: 1.854 m (6' 1\").    Weight as of this encounter: 95.4 kg (210 lb 6.4 oz).   Weight management plan: Discussed healthy diet and exercise guidelines and patient will follow up in 6 months in clinic to re-evaluate.      1. Anxiety  MUCH improved with the lexapro treatment.  Follow up in 6 months.    - escitalopram (LEXAPRO) 10 MG tablet; Take 1 tablet (10 mg) by mouth daily  Dispense: 90 tablet; Refill: 1    2. Moderate single current episode of major depressive disorder (H)  As above.   - escitalopram (LEXAPRO) 10 MG tablet; Take 1 tablet (10 mg) by mouth daily  Dispense: 90 tablet; Refill: 1    3. Pain of finger of left hand  Appears positional.  No functional limitation.  Will monitor symptoms.     4. Muscle pain  Resolved with current treatment.  Likely related to vitamin D deficiency.    5. Vitamin D deficiency  1 month of high dose treatment completed.  1 additional month high dose then 2000 IU daily.      Patient Instructions   Continue " lexapro - refills sent.  Follow up in 6 months via Evisit, phone visit or office visit.    Complete the weekly vitamin D then transition to over the counter vitamin D 2000 IU daily        Dianne Gallegos MD  [facility]

## 2017-03-13 NOTE — PATIENT INSTRUCTIONS
Continue lexapro - refills sent.  Follow up in 6 months via Evisit, phone visit or office visit.    Complete the weekly vitamin D then transition to over the counter vitamin D 2000 IU daily

## 2017-03-14 ASSESSMENT — PATIENT HEALTH QUESTIONNAIRE - PHQ9: SUM OF ALL RESPONSES TO PHQ QUESTIONS 1-9: 2

## 2017-03-14 ASSESSMENT — ANXIETY QUESTIONNAIRES: GAD7 TOTAL SCORE: 3

## 2017-05-19 ENCOUNTER — OFFICE VISIT (OUTPATIENT)
Dept: FAMILY MEDICINE | Facility: CLINIC | Age: 37
End: 2017-05-19
Payer: COMMERCIAL

## 2017-05-19 VITALS
BODY MASS INDEX: 27.3 KG/M2 | DIASTOLIC BLOOD PRESSURE: 82 MMHG | WEIGHT: 206 LBS | RESPIRATION RATE: 12 BRPM | TEMPERATURE: 98.6 F | HEART RATE: 85 BPM | OXYGEN SATURATION: 99 % | SYSTOLIC BLOOD PRESSURE: 126 MMHG | HEIGHT: 73 IN

## 2017-05-19 DIAGNOSIS — L02.91 ABSCESS: Primary | ICD-10-CM

## 2017-05-19 PROCEDURE — 99213 OFFICE O/P EST LOW 20 MIN: CPT | Performed by: PHYSICIAN ASSISTANT

## 2017-05-19 RX ORDER — CEPHALEXIN 500 MG/1
500 CAPSULE ORAL 4 TIMES DAILY
Qty: 40 CAPSULE | Refills: 0 | Status: SHIPPED | OUTPATIENT
Start: 2017-05-19 | End: 2017-06-01

## 2017-05-19 NOTE — MR AVS SNAPSHOT
After Visit Summary   5/19/2017    Rosendo Dallas    MRN: [id number]           Patient Information     Date Of Birth          1980        Visit Information        Provider Department      5/19/2017 10:40 AM Eliza Patel PA-C [facility]        Today's Diagnoses     Abscess    -  1      Care Instructions    Take 500mg four times a day for 10 days  Follow up with us next week if symptoms not improving.   Return urgently if any change in symptoms like increasing redness, swelling, pain fever or other change in symptoms  Apply moist heat to affected area 15-20 minutes at a time several times a day        Follow-ups after your visit        Who to contact     If you have questions or need follow up information about today's clinic visit or your schedule please contact [facility] directly at [phone].  Normal or non-critical lab and imaging results will be communicated to you by MyChart, letter or phone within 4 business days after the clinic has received the results. If you do not hear from us within 7 days, please contact the clinic through MyChart or phone. If you have a critical or abnormal lab result, we will notify you by phone as soon as possible.  Submit refill requests through [employer] or call your pharmacy and they will forward the refill request to us. Please allow 3 business days for your refill to be completed.          Additional Information About Your Visit        MyChart Information     [employer] gives you secure access to your electronic health record. If you see a primary care provider, you can also send messages to your care team and make appointments. If you have questions, please call your primary care clinic.  If you do not have a primary care provider, please call [phone] and they will assist you.        Care EveryWhere ID     This is your Care EveryWhere ID. This could be used by other organizations to access your [facility]  "records  [id number]        Your Vitals Were     Pulse Temperature Respirations Height Pulse Oximetry BMI (Body Mass Index)    85 98.6  F (37  C) (Oral) 12 1.854 m (6' 1\") 99% 27.18 kg/m2       Blood Pressure from Last 3 Encounters:   05/19/17 126/82   03/13/17 124/86   02/16/17 114/88    Weight from Last 3 Encounters:   05/19/17 93.4 kg (206 lb)   03/13/17 95.4 kg (210 lb 6.4 oz)   02/16/17 92.7 kg (204 lb 4.8 oz)              Today, you had the following     No orders found for display         Today's Medication Changes          These changes are accurate as of: 5/19/17 10:45 AM.  If you have any questions, ask your nurse or doctor.               Start taking these medicines.        Dose/Directions    cephALEXin 500 MG capsule   Commonly known as:  KEFLEX   Used for:  Abscess   Started by:  Eliza Patel PA-C        Dose:  500 mg   Take 1 capsule (500 mg) by mouth 4 times daily   Quantity:  40 capsule   Refills:  0            Where to get your medicines      These medications were sent to [facility] [address] [place] [address] [facility]  AT [facility] OF [facility] [address] [facility] [facility] , [facility] [address]     Phone:  [phone]     cephALEXin 500 MG capsule                Primary Care Provider Office Phone #    [facility] [phone]       No address on file        Thank you!     Thank you for choosing [facility]  for your care. Our goal is always to provide you with excellent care. Hearing back from our patients is one way we can continue to improve our services. Please take a few minutes to complete the written survey that you may receive in the mail after your visit with us. Thank you!             Your Updated Medication List - Protect others around you: Learn how to safely use, store and throw away your medicines at www.disposemymeds.org.          This list is accurate as of: 5/19/17 10:45 AM.  Always use your most recent med list.                   " Brand Name Dispense Instructions for use    cephALEXin 500 MG capsule    KEFLEX    40 capsule    Take 1 capsule (500 mg) by mouth 4 times daily       escitalopram 10 MG tablet    LEXAPRO    90 tablet    Take 1 tablet (10 mg) by mouth daily       omeprazole 10 MG CR capsule    priLOSEC    60 capsule    Take by mouth 30-60 minutes before a meal.

## 2017-05-19 NOTE — NURSING NOTE
"Chief Complaint   Patient presents with     Mass       Initial /82 (BP Location: Right arm, Patient Position: Chair, Cuff Size: Adult Regular)  Pulse 85  Temp 98.6  F (37  C) (Oral)  Resp 12  Ht 1.854 m (6' 1\")  Wt 93.4 kg (206 lb)  SpO2 99%  BMI 27.18 kg/m2 Estimated body mass index is 27.18 kg/(m^2) as calculated from the following:    Height as of this encounter: 1.854 m (6' 1\").    Weight as of this encounter: 93.4 kg (206 lb).  Medication Reconciliation: bart Barnes        "

## 2017-05-19 NOTE — PATIENT INSTRUCTIONS
Take 500mg four times a day for 10 days  Follow up with us next week if symptoms not improving.   Return urgently if any change in symptoms like increasing redness, swelling, pain fever or other change in symptoms  Apply moist heat to affected area 15-20 minutes at a time several times a day

## 2017-05-19 NOTE — PROGRESS NOTES
SUBJECTIVE:                                                    Rosendo Dallas is a 36 year old male who presents to clinic today for the following health issues:      Concern - RT leg mass     Onset: two weeks ago    Description:   Mass on inner RT thigh, red    Intensity: mild    Progression of Symptoms:  same    Accompanying Signs & Symptoms:  A little tender       Previous history of similar problem:   no    Precipitating factors:   Worsened by: unsure    Alleviating factors:  Improved by: nothing       Therapies Tried and outcome: peroxide did not help      Approximately 2 weeks ago noted red lump right thigh.  Is tender-no fever, sweats, chills.  No history of abscess or MRSA.  Does run for exercise and admits more sweaty    Problem list and histories reviewed & adjusted, as indicated.  Additional history: as documented    Patient Active Problem List   Diagnosis     Vitamin D deficiency     Muscle pain     Anxiety     Major depressive disorder, single episode, moderate (H)     History reviewed. No pertinent surgical history.    Social History   Substance Use Topics     Smoking status: Never Smoker     Smokeless tobacco: Not on file     Alcohol use Yes      Comment: occassional     Family History   Problem Relation Age of Onset     Hypertension Mother      Hyperlipidemia Mother      Depression Other      dad's side     DIABETES No family hx of      Coronary Artery Disease No family hx of      CEREBROVASCULAR DISEASE No family hx of      Breast Cancer No family hx of      Colon Cancer No family hx of      Prostate Cancer No family hx of      Thyroid Disease No family hx of          Current Outpatient Prescriptions   Medication Sig Dispense Refill            escitalopram (LEXAPRO) 10 MG tablet Take 1 tablet (10 mg) by mouth daily 90 tablet 1     omeprazole (PRILOSEC) 10 MG capsule Take by mouth 30-60 minutes before a meal. 60 capsule        Reviewed and updated as needed this visit by clinical staff  Tobacco   "Allergies  Meds  Med Hx  Surg Hx  Fam Hx  Soc Hx      Reviewed and updated as needed this visit by Provider         ROS:  Constitutional, HEENT, cardiovascular, pulmonary, gi and gu systems are negative, except as otherwise noted.    OBJECTIVE:                                                    /82 (BP Location: Right arm, Patient Position: Chair, Cuff Size: Adult Regular)  Pulse 85  Temp 98.6  F (37  C) (Oral)  Resp 12  Ht 1.854 m (6' 1\")  Wt 93.4 kg (206 lb)  SpO2 99%  BMI 27.18 kg/m2  Body mass index is 27.18 kg/(m^2).  GENERAL: healthy, alert and no distress  NECK: no adenopathy, no asymmetry, masses, or scars and thyroid normal to palpation  RESP: lungs clear to auscultation - no rales, rhonchi or wheezes  CV: regular rate and rhythm, normal S1 S2, no S3 or S4, no murmur, click or rub, no peripheral edema and peripheral pulses strong  ABDOMEN: soft, nontender, no hepatosplenomegaly, no masses and bowel sounds normal  SKIN: right medial thigh near groin with approximately 4 centimeter by 2 centimeter erythemtous edematous area with no area of fluctuance or pustule but with edema and tender  No cervical, axillary or inguinal adenopathy    Diagnostic Test Results:  none      ASSESSMENT/PLAN:                                                            1. Abscess  Since no area of fluctuance I don't think would benefit from I and D and will treat with oral antibiotic and hot packs.   - cephALEXin (KEFLEX) 500 MG capsule; Take 1 capsule (500 mg) by mouth 4 times daily  Dispense: 40 capsule; Refill: 0    Patient Instructions   Take 500mg four times a day for 10 days  Follow up with us next week if symptoms not improving.   Return urgently if any change in symptoms like increasing redness, swelling, pain fever or other change in symptoms  Apply moist heat to affected area 15-20 minutes at a time several times a day       Eliza Patel PA-C  [facility]"

## 2017-06-01 ENCOUNTER — OFFICE VISIT (OUTPATIENT)
Dept: FAMILY MEDICINE | Facility: CLINIC | Age: 37
End: 2017-06-01
Payer: COMMERCIAL

## 2017-06-01 ENCOUNTER — OFFICE VISIT (OUTPATIENT)
Dept: URGENT CARE | Facility: URGENT CARE | Age: 37
End: 2017-06-01
Payer: COMMERCIAL

## 2017-06-01 ENCOUNTER — RADIANT APPOINTMENT (OUTPATIENT)
Dept: GENERAL RADIOLOGY | Facility: CLINIC | Age: 37
End: 2017-06-01
Attending: FAMILY MEDICINE
Payer: COMMERCIAL

## 2017-06-01 VITALS
BODY MASS INDEX: 27.13 KG/M2 | SYSTOLIC BLOOD PRESSURE: 104 MMHG | DIASTOLIC BLOOD PRESSURE: 82 MMHG | OXYGEN SATURATION: 97 % | TEMPERATURE: 98.2 F | WEIGHT: 204.7 LBS | RESPIRATION RATE: 12 BRPM | HEIGHT: 73 IN | HEART RATE: 67 BPM

## 2017-06-01 VITALS
HEART RATE: 66 BPM | RESPIRATION RATE: 16 BRPM | TEMPERATURE: 98.3 F | HEIGHT: 73 IN | DIASTOLIC BLOOD PRESSURE: 82 MMHG | WEIGHT: 203.4 LBS | BODY MASS INDEX: 26.96 KG/M2 | SYSTOLIC BLOOD PRESSURE: 106 MMHG | OXYGEN SATURATION: 97 %

## 2017-06-01 DIAGNOSIS — E80.6 HYPERBILIRUBINEMIA: ICD-10-CM

## 2017-06-01 DIAGNOSIS — Z13.220 LIPID SCREENING: ICD-10-CM

## 2017-06-01 DIAGNOSIS — R10.11 ABDOMINAL PAIN, RIGHT UPPER QUADRANT: Primary | ICD-10-CM

## 2017-06-01 DIAGNOSIS — R07.81 RIB PAIN ON RIGHT SIDE: ICD-10-CM

## 2017-06-01 DIAGNOSIS — R10.11 RUQ ABDOMINAL PAIN: Primary | ICD-10-CM

## 2017-06-01 LAB
ALBUMIN SERPL-MCNC: 4.3 G/DL (ref 3.4–5)
ALP SERPL-CCNC: 58 U/L (ref 40–150)
ALT SERPL W P-5'-P-CCNC: 30 U/L (ref 0–70)
ANION GAP SERPL CALCULATED.3IONS-SCNC: 7 MMOL/L (ref 3–14)
AST SERPL W P-5'-P-CCNC: 29 U/L (ref 0–45)
BASOPHILS # BLD AUTO: 0 10E9/L (ref 0–0.2)
BASOPHILS NFR BLD AUTO: 0.3 %
BILIRUB SERPL-MCNC: 1.9 MG/DL (ref 0.2–1.3)
BUN SERPL-MCNC: 18 MG/DL (ref 7–30)
CALCIUM SERPL-MCNC: 9.1 MG/DL (ref 8.5–10.1)
CHLORIDE SERPL-SCNC: 101 MMOL/L (ref 94–109)
CO2 SERPL-SCNC: 29 MMOL/L (ref 20–32)
CREAT SERPL-MCNC: 0.9 MG/DL (ref 0.66–1.25)
DIFFERENTIAL METHOD BLD: NORMAL
EOSINOPHIL # BLD AUTO: 0.1 10E9/L (ref 0–0.7)
EOSINOPHIL NFR BLD AUTO: 1 %
ERYTHROCYTE [DISTWIDTH] IN BLOOD BY AUTOMATED COUNT: 11.7 % (ref 10–15)
GFR SERPL CREATININE-BSD FRML MDRD: >90 ML/MIN/1.7M2
GLUCOSE SERPL-MCNC: 104 MG/DL (ref 70–99)
HCT VFR BLD AUTO: 46 % (ref 40–53)
HGB BLD-MCNC: 16.4 G/DL (ref 13.3–17.7)
LIPASE SERPL-CCNC: 111 U/L (ref 73–393)
LYMPHOCYTES # BLD AUTO: 1.6 10E9/L (ref 0.8–5.3)
LYMPHOCYTES NFR BLD AUTO: 23.8 %
MCH RBC QN AUTO: 31.6 PG (ref 26.5–33)
MCHC RBC AUTO-ENTMCNC: 35.7 G/DL (ref 31.5–36.5)
MCV RBC AUTO: 89 FL (ref 78–100)
MONOCYTES # BLD AUTO: 0.4 10E9/L (ref 0–1.3)
MONOCYTES NFR BLD AUTO: 6.1 %
NEUTROPHILS # BLD AUTO: 4.6 10E9/L (ref 1.6–8.3)
NEUTROPHILS NFR BLD AUTO: 68.8 %
PLATELET # BLD AUTO: 226 10E9/L (ref 150–450)
POTASSIUM SERPL-SCNC: 4.3 MMOL/L (ref 3.4–5.3)
PROT SERPL-MCNC: 7.3 G/DL (ref 6.8–8.8)
RBC # BLD AUTO: 5.19 10E12/L (ref 4.4–5.9)
SODIUM SERPL-SCNC: 137 MMOL/L (ref 133–144)
WBC # BLD AUTO: 6.7 10E9/L (ref 4–11)

## 2017-06-01 PROCEDURE — 99214 OFFICE O/P EST MOD 30 MIN: CPT | Performed by: FAMILY MEDICINE

## 2017-06-01 PROCEDURE — 82150 ASSAY OF AMYLASE: CPT | Performed by: FAMILY MEDICINE

## 2017-06-01 PROCEDURE — 85025 COMPLETE CBC W/AUTO DIFF WBC: CPT | Performed by: FAMILY MEDICINE

## 2017-06-01 PROCEDURE — 80053 COMPREHEN METABOLIC PANEL: CPT | Performed by: FAMILY MEDICINE

## 2017-06-01 PROCEDURE — 71101 X-RAY EXAM UNILAT RIBS/CHEST: CPT | Mod: RT

## 2017-06-01 PROCEDURE — 83690 ASSAY OF LIPASE: CPT | Performed by: FAMILY MEDICINE

## 2017-06-01 PROCEDURE — 36415 COLL VENOUS BLD VENIPUNCTURE: CPT | Performed by: FAMILY MEDICINE

## 2017-06-01 ASSESSMENT — PAIN SCALES - GENERAL: PAINLEVEL: MILD PAIN (3)

## 2017-06-01 NOTE — NURSING NOTE
"Chief Complaint   Patient presents with     Urgent Care     Abdominal Pain     Right lower rib pain x 7 days.  Sharp and dull.        Initial /82 (BP Location: Right arm, Patient Position: Chair, Cuff Size: Adult Regular)  Pulse 66  Temp 98.3  F (36.8  C) (Tympanic)  Resp 16  Ht 6' 1\" (1.854 m)  Wt 203 lb 6.4 oz (92.3 kg)  SpO2 97%  BMI 26.84 kg/m2 Estimated body mass index is 26.84 kg/(m^2) as calculated from the following:    Height as of this encounter: 6' 1\" (1.854 m).    Weight as of this encounter: 203 lb 6.4 oz (92.3 kg).  Medication Reconciliation: bart Nick CMA 6/1/2017 11:02 AM      "

## 2017-06-01 NOTE — PROGRESS NOTES
"SUBJECTIVE:  Chief Complaint   Patient presents with     Urgent Care     Abdominal Pain     Right lower rib pain x 7 days.  Sharp and dull.      Rosendo Dallas is a 36 year old male who presents with a chief complaint of right sided rib pain, RUQ abd pain.  Symptoms began 1 week(s) ago, are mild and moderate and sudden onset and fluctuating  Context:  Injury:No.  Worse with movement and deep breaths.  Endorsed IBS and has had GERD but this does not feel the same.  Patient denies any fever, no URI symptoms, no excessive coughing.  Patient states that pain got worse last night.  No trauma.  Patient has not taken any medication yet.  Still has gallbladder and appendix.  Endorsed mild nausea, no vomiting or diarrhea.  Patient states that has Gilbert's syndrome.    Past Medical History:   Diagnosis Date     IBS (irritable colon syndrome)      Current Outpatient Prescriptions   Medication Sig Dispense Refill     escitalopram (LEXAPRO) 10 MG tablet Take 1 tablet (10 mg) by mouth daily 90 tablet 1     omeprazole (PRILOSEC) 10 MG capsule Take by mouth 30-60 minutes before a meal. 60 capsule      Social History   Substance Use Topics     Smoking status: Never Smoker     Smokeless tobacco: Not on file     Alcohol use Yes      Comment: occassional       ROS:  CONSTITUTIONAL:NEGATIVE for fever, chills, change in weight  INTEGUMENTARY/SKIN: NEGATIVE for worrisome rashes, moles or lesions  ENT/MOUTH: NEGATIVE for ear, mouth and throat problems  RESP:NEGATIVE for significant cough or SOB  CV: POSITIVE for chest pain/chest pressure  GI: POSITIVE for abdominal pain RUQ and nausea  : negative for dysuria, hematuria  MUSCULOSKELETAL: POSITIVE  for rib pain  PSYCHIATRIC: NEGATIVE for changes in mood or affect, HX anxiety and HX depression    EXAM:   /82 (BP Location: Right arm, Patient Position: Chair, Cuff Size: Adult Regular)  Pulse 66  Temp 98.3  F (36.8  C) (Tympanic)  Resp 16  Ht 6' 1\" (1.854 m)  Wt 203 lb 6.4 oz (92.3 " kg)  SpO2 97%  BMI 26.84 kg/m2  GENERAL APPEARANCE: healthy, alert and no distress  CHEST: clear to auscultation, no wheezes, no crackles.  Anterior lower rib tenderness with palpation  CV: regular rate and rhythm  ABD: soft, BS present, mild RUQ tenderness, no rebound, no guarding.  BACK: no flank tenderness  EXTREMITIES: peripheral pulses normal  SKIN: no suspicious lesions or rashes  NEURO: Normal strength and tone, sensory exam grossly normal, mentation intact and speech normal    Results for orders placed or performed in visit on 06/01/17   CBC with platelets and differential   Result Value Ref Range    WBC 6.7 4.0 - 11.0 10e9/L    RBC Count 5.19 4.4 - 5.9 10e12/L    Hemoglobin 16.4 13.3 - 17.7 g/dL    Hematocrit 46.0 40.0 - 53.0 %    MCV 89 78 - 100 fl    MCH 31.6 26.5 - 33.0 pg    MCHC 35.7 31.5 - 36.5 g/dL    RDW 11.7 10.0 - 15.0 %    Platelet Count 226 150 - 450 10e9/L    Diff Method Automated Method     % Neutrophils 68.8 %    % Lymphocytes 23.8 %    % Monocytes 6.1 %    % Eosinophils 1.0 %    % Basophils 0.3 %    Absolute Neutrophil 4.6 1.6 - 8.3 10e9/L    Absolute Lymphocytes 1.6 0.8 - 5.3 10e9/L    Absolute Monocytes 0.4 0.0 - 1.3 10e9/L    Absolute Eosinophils 0.1 0.0 - 0.7 10e9/L    Absolute Basophils 0.0 0.0 - 0.2 10e9/L   Comprehensive metabolic panel (BMP + Alb, Alk Phos, ALT, AST, Total. Bili, TP)   Result Value Ref Range    Sodium 137 133 - 144 mmol/L    Potassium 4.3 3.4 - 5.3 mmol/L    Chloride 101 94 - 109 mmol/L    Carbon Dioxide 29 20 - 32 mmol/L    Anion Gap 7 3 - 14 mmol/L    Glucose 104 (H) 70 - 99 mg/dL    Urea Nitrogen 18 7 - 30 mg/dL    Creatinine 0.90 0.66 - 1.25 mg/dL    GFR Estimate >90 >60 mL/min/1.7m2    GFR Estimate If Black >90 >60 mL/min/1.7m2    Calcium 9.1 8.5 - 10.1 mg/dL    Bilirubin Total 1.9 (H) 0.2 - 1.3 mg/dL    Albumin 4.3 3.4 - 5.0 g/dL    Protein Total 7.3 6.8 - 8.8 g/dL    Alkaline Phosphatase 58 40 - 150 U/L    ALT 30 0 - 70 U/L    AST 29 0 - 45 U/L       X-RAY  was done - rib/chest - no acute rib fracture, no pleural effusion, no pneumothorax, no acute infiltrate    ASSESSMENT/PLAN:  (R10.11) Abdominal pain, right upper quadrant  (primary encounter diagnosis)  Plan: CBC with platelets and differential,         Comprehensive metabolic panel (BMP + Alb, Alk         Phos, ALT, AST, Total. Bili, TP), Amylase,         Lipase            (R07.81) Rib pain on right side  Comment: costochondritis/pleurisy  Plan: XR Ribs & Chest Right G/E 3 Views            Reviewed initial results, overall reassuring and patient does not have acute surgical abdomen and appears non-toxic.  Will follow up on pending labs, discussed that may still need ultrasound for further evaluation and this will need to be thru his primary provider.    Discussed rib pain due to either pleurisy or costochondritis as more of a physical aspect.  Reviewed NSAID treatment to help with discomfort and encourage to increase omeprazole to minimize GI side effects.    Follow up with primary provider next week for further evaluation, to ER if develops worsening abdominal discomfort.    Jace Granados MD  June 1, 2017 3:23 PM

## 2017-06-01 NOTE — MR AVS SNAPSHOT
After Visit Summary   6/1/2017    Rosendo Dallas    MRN: [id number]           Patient Information     Date Of Birth          1980        Visit Information        Provider Department      6/1/2017 10:55 AM Jace Granados MD [facility]        Today's Diagnoses     Abdominal pain, right upper quadrant    -  1    Rib pain on right side          Care Instructions    Okay to take ibuprofen 200 mg - 4 tablets (800 mg) every 8 hours as needed.  Okay to take tylenol 500 mg - 2 tablets (1000 mg) every 6-8 hours as needed, do not exceed 3000 mg in 24 hours.  Okay to increase omeprazole 20 mg 1-2 times daily while on ibuprofen.    Follow up with primary to review possible gallstones and imaging to be determined.      Possible Gallstone with Biliary Colic (Presumed)    Your abdominal pain is may be due to spasm of the gallbladder. This is called gallbladder or biliary colic. The gallbladder is a small sac under the liver, which stores and releases bile. Bile is a fluid that aids in the digestion of fat. Eating fatty food stimulates the gallbladder to contract, and release the bile. A gallstone may form in this sac. Although most people do not have symptoms, when the stone moves and blocks the passage of bile out of the bladder, it can cause pain and even an infection.  To be more certain of the diagnosis, you may need to have an ultrasound, CT-scan or other special test.  A number of things increase the risk for developing gallstones:    Women are more likely    Obesity    Increasing age    Losing or gaining weight quickly    High calorie diet    Pregnancy    Hormone therapy    Diabetes  The most common symptoms are:    Abdominal pain, cramping, aching    Nausea, vomiting    Fever  Many illnesses can cause these symptoms. This pain usually starts in the upper right side of your abdomen. Sometimes it can radiate to your right shoulder, back and arm. It usually starts suddenly, becomes more intense  quickly, and then gradually decreases and disappears over a couple of hours. Elderly people and diabetics may have difficulty showing where the pain is exactly.  Home care    Rest in bed and follow a clear liquid diet until feeling better. If pain or nausea medicine was given to help with your symptoms, take these as directed.    You can take acetaminophen or ibuprofen for pain, unless you were given a different pain medicine to use.Note: If you have chronic liver or kidney disease or ever had a stomach ulcer or GI bleeding or are taking blood thinner medications, talk with your doctor before using these medicines.    Fat in your diet makes the gallbladder contract and may cause increased pain. Therefore, avoid fat in your diet over the next two days and follow a low-fat diet after that. If you are overweight, a low fat diet will help you lose weight.  Follow-up care  If a test was already scheduled for you, keep this appointment. Be sure you know how to prepare yourself for the test. Usually, you will be asked not to eat or drink anything for at least 8 hours before the test. Schedule an appointment with your own doctor after your test is complete to discuss the findings.  When to seek medical advice  Call your healthcare provider if any of the following occur:    Pain gets worse or moves to the right lower abdomen    Repeated vomiting    Swelling of the abdomen    Pain lasts over 6 hours    Fever of 100.4  F (38  C) or higher, or as directed by your healthcare provider    Weakness, dizziness    Dark urine or light colored stools    Yellow color of the skin or eyes    Chest, arm, back, neck or jaw pain  Call 911  Get emergency medical care right away if any of these occur:    Trouble breathing    Very confused    Very drowsy or trouble awakening    Fainting or loss of consciousness    Rapid heart rate    2463-7209 [employer]. [address] [place] [address]. All rights reserved. This  information is not intended as a substitute for professional medical care. Always follow your healthcare professional's instructions.        Pleurisy    If you have pleurisy, the lining around your lungs is inflamed. This is most often due to a viral infection or pneumonia. It usually lasts for 10 to 14 days. It may cause sharp pain with breathing, coughing, sneezing, and movement. Antibiotics are usually not prescribed for this condition unless pneumonia is also present.  The following tips will help you care for your condition at home:    If symptoms are severe, rest at home for the first 2 to 3 days. When you resume activity, don't let yourself get too tired.    Do not smoke. Also avoid being exposed to secondhand smoke.    You may use over-the-counter medicines to control pain, unless another pain medicine was prescribed. (Note: If you have chronic liver or kidney disease or have ever had a stomach ulcer or gastrointestinal bleeding, talk with your healthcare provider before using these medicines. Also talk to your provider if you are taking medicine to prevent blood clots.) Aspirin should never be given to anyone younger than 18 years of age who is ill with a viral infection or fever. It may cause severe liver or brain damage.  Follow-up care  Follow up with your healthcare provider, or as advised.  When to seek medical advice  Call your healthcare provider right away if any of these occur:    Fever over 100.4 F (38 C)    Coughing up lots of colored sputum (mucus)    Redness, pain, or swelling of the leg  Call 911, or get immediate medical care  Contact emergency services right away if any of these occur:    Increasing shortness of breath    Increasing chest pain, or pain that spreads to the neck, arm, or back    Coughing up blood    5793-1279 [employer]. [address] [place] [address]. All rights reserved. This information is not intended as a substitute for professional medical care.  "Always follow your healthcare professional's instructions.                Follow-ups after your visit        Who to contact     If you have questions or need follow up information about today's clinic visit or your schedule please contact [facility] directly at [phone].  Normal or non-critical lab and imaging results will be communicated to you by [employer], letter or phone within 4 business days after the clinic has received the results. If you do not hear from us within 7 days, please contact the clinic through [employer] or phone. If you have a critical or abnormal lab result, we will notify you by phone as soon as possible.  Submit refill requests through [employer] or call your pharmacy and they will forward the refill request to us. Please allow 3 business days for your refill to be completed.          Additional Information About Your Visit        [employer] Information     [employer] gives you secure access to your electronic health record. If you see a primary care provider, you can also send messages to your care team and make appointments. If you have questions, please call your primary care clinic.  If you do not have a primary care provider, please call [phone] and they will assist you.        Care EveryWhere ID     This is your Care EveryWhere ID. This could be used by other organizations to access your [facility] medical records  [id number]        Your Vitals Were     Pulse Temperature Respirations Height Pulse Oximetry BMI (Body Mass Index)    66 98.3  F (36.8  C) (Tympanic) 16 6' 1\" (1.854 m) 97% 26.84 kg/m2       Blood Pressure from Last 3 Encounters:   06/01/17 106/82   05/19/17 126/82   03/13/17 124/86    Weight from Last 3 Encounters:   06/01/17 203 lb 6.4 oz (92.3 kg)   05/19/17 206 lb (93.4 kg)   03/13/17 210 lb 6.4 oz (95.4 kg)              We Performed the Following     Amylase     CBC with platelets and differential     Comprehensive metabolic panel (BMP + Alb, Alk Phos, ALT, " AST, Total. Glendy, NORY)     Lipase        Primary Care Provider Office Phone #    [facility] [phone]       No address on file        Thank you!     Thank you for choosing [facility]  for your care. Our goal is always to provide you with excellent care. Hearing back from our patients is one way we can continue to improve our services. Please take a few minutes to complete the written survey that you may receive in the mail after your visit with us. Thank you!             Your Updated Medication List - Protect others around you: Learn how to safely use, store and throw away your medicines at www.disposemymeds.org.          This list is accurate as of: 6/1/17 12:35 PM.  Always use your most recent med list.                   Brand Name Dispense Instructions for use    escitalopram 10 MG tablet    LEXAPRO    90 tablet    Take 1 tablet (10 mg) by mouth daily       omeprazole 10 MG CR capsule    priLOSEC    60 capsule    Take by mouth 30-60 minutes before a meal.

## 2017-06-01 NOTE — MR AVS SNAPSHOT
After Visit Summary   6/1/2017    Rosendo Dallas    MRN: [id number]           Patient Information     Date Of Birth          1980        Visit Information        Provider Department      6/1/2017 1:00 PM Dianne Gallegos MD [facility]        Today's Diagnoses     Lipid screening    -  1    RUQ abdominal pain          Care Instructions    Ultrasound abdomen recommended.   You can call [phone] to schedule this at the [facility] in [place] (formerly the [facility]).     For the discomfort, you can take Ibuprofen 600 mg (3 pills) three times a day with food for 3-5 days.                Follow-ups after your visit        Your next 10 appointments already scheduled     Jun 07, 2017  8:20 AM CDT   LAB with BA LAB ONLY   [facility] ([facility])    [address] [address] [phone]           Patient must bring picture ID.  Patient should be prepared to give a urine specimen  Please do not eat 10-12 hours before your appointment if you are coming in fasting for labs on lipids, cholesterol, or glucose (sugar).  Pregnant women should follow their Care Team instructions. Water with medications is okay. Do not drink coffee or other fluids.   If you have concerns about taking  your medications, please ask at office or if scheduling via [employer], send a message by clicking on Secure Messaging, Message Your Care Team.              Future tests that were ordered for you today     Open Future Orders        Priority Expected Expires Ordered    US Abdomen Complete Routine  6/1/2018 6/1/2017    Lipid panel reflex to direct LDL Routine  6/1/2018 6/1/2017            Who to contact     If you have questions or need follow up information about today's clinic visit or your schedule please contact [facility] directly at [phone].  Normal or non-critical lab and imaging results  "will be communicated to you by MyChart, letter or phone within 4 business days after the clinic has received the results. If you do not hear from us within 7 days, please contact the clinic through [employer] or phone. If you have a critical or abnormal lab result, we will notify you by phone as soon as possible.  Submit refill requests through [employer] or call your pharmacy and they will forward the refill request to us. Please allow 3 business days for your refill to be completed.          Additional Information About Your Visit        [employer] Information     [employer] gives you secure access to your electronic health record. If you see a primary care provider, you can also send messages to your care team and make appointments. If you have questions, please call your primary care clinic.  If you do not have a primary care provider, please call [phone] and they will assist you.        Care EveryWhere ID     This is your Care EveryWhere ID. This could be used by other organizations to access your [facility] medical records  [id number]        Your Vitals Were     Pulse Temperature Respirations Height Pulse Oximetry BMI (Body Mass Index)    67 98.2  F (36.8  C) (Oral) 12 1.854 m (6' 1\") 97% 27.01 kg/m2       Blood Pressure from Last 3 Encounters:   06/01/17 104/82   06/01/17 106/82   05/19/17 126/82    Weight from Last 3 Encounters:   06/01/17 92.9 kg (204 lb 11.2 oz)   06/01/17 92.3 kg (203 lb 6.4 oz)   05/19/17 93.4 kg (206 lb)               Primary Care Provider Office Phone #    [facility] [phone]       No address on file        Thank you!     Thank you for choosing [facility]  for your care. Our goal is always to provide you with excellent care. Hearing back from our patients is one way we can continue to improve our services. Please take a few minutes to complete the written survey that you may receive in the mail after your visit with us. Thank you!             Your Updated " Medication List - Protect others around you: Learn how to safely use, store and throw away your medicines at www.disposemymeds.org.          This list is accurate as of: 6/1/17  1:36 PM.  Always use your most recent med list.                   Brand Name Dispense Instructions for use    escitalopram 10 MG tablet    LEXAPRO    90 tablet    Take 1 tablet (10 mg) by mouth daily       omeprazole 10 MG CR capsule    priLOSEC    60 capsule    Take by mouth 30-60 minutes before a meal.

## 2017-06-01 NOTE — NURSING NOTE
"Chief Complaint   Patient presents with     Abdominal Pain       Initial /82 (BP Location: Right arm, Patient Position: Chair, Cuff Size: Adult Regular)  Pulse 67  Temp 98.2  F (36.8  C) (Oral)  Resp 12  Ht 1.854 m (6' 1\")  Wt 92.9 kg (204 lb 11.2 oz)  SpO2 97%  BMI 27.01 kg/m2 Estimated body mass index is 27.01 kg/(m^2) as calculated from the following:    Height as of this encounter: 1.854 m (6' 1\").    Weight as of this encounter: 92.9 kg (204 lb 11.2 oz).  Medication Reconciliation: bart Barnes        "

## 2017-06-01 NOTE — PATIENT INSTRUCTIONS
Okay to take ibuprofen 200 mg - 4 tablets (800 mg) every 8 hours as needed.  Okay to take tylenol 500 mg - 2 tablets (1000 mg) every 6-8 hours as needed, do not exceed 3000 mg in 24 hours.  Okay to increase omeprazole 20 mg 1-2 times daily while on ibuprofen.    Follow up with primary to review possible gallstones and imaging to be determined.      Possible Gallstone with Biliary Colic (Presumed)    Your abdominal pain is may be due to spasm of the gallbladder. This is called gallbladder or biliary colic. The gallbladder is a small sac under the liver, which stores and releases bile. Bile is a fluid that aids in the digestion of fat. Eating fatty food stimulates the gallbladder to contract, and release the bile. A gallstone may form in this sac. Although most people do not have symptoms, when the stone moves and blocks the passage of bile out of the bladder, it can cause pain and even an infection.  To be more certain of the diagnosis, you may need to have an ultrasound, CT-scan or other special test.  A number of things increase the risk for developing gallstones:    Women are more likely    Obesity    Increasing age    Losing or gaining weight quickly    High calorie diet    Pregnancy    Hormone therapy    Diabetes  The most common symptoms are:    Abdominal pain, cramping, aching    Nausea, vomiting    Fever  Many illnesses can cause these symptoms. This pain usually starts in the upper right side of your abdomen. Sometimes it can radiate to your right shoulder, back and arm. It usually starts suddenly, becomes more intense quickly, and then gradually decreases and disappears over a couple of hours. Elderly people and diabetics may have difficulty showing where the pain is exactly.  Home care    Rest in bed and follow a clear liquid diet until feeling better. If pain or nausea medicine was given to help with your symptoms, take these as directed.    You can take acetaminophen or ibuprofen for pain, unless you  were given a different pain medicine to use.Note: If you have chronic liver or kidney disease or ever had a stomach ulcer or GI bleeding or are taking blood thinner medications, talk with your doctor before using these medicines.    Fat in your diet makes the gallbladder contract and may cause increased pain. Therefore, avoid fat in your diet over the next two days and follow a low-fat diet after that. If you are overweight, a low fat diet will help you lose weight.  Follow-up care  If a test was already scheduled for you, keep this appointment. Be sure you know how to prepare yourself for the test. Usually, you will be asked not to eat or drink anything for at least 8 hours before the test. Schedule an appointment with your own doctor after your test is complete to discuss the findings.  When to seek medical advice  Call your healthcare provider if any of the following occur:    Pain gets worse or moves to the right lower abdomen    Repeated vomiting    Swelling of the abdomen    Pain lasts over 6 hours    Fever of 100.4  F (38  C) or higher, or as directed by your healthcare provider    Weakness, dizziness    Dark urine or light colored stools    Yellow color of the skin or eyes    Chest, arm, back, neck or jaw pain  Call 911  Get emergency medical care right away if any of these occur:    Trouble breathing    Very confused    Very drowsy or trouble awakening    Fainting or loss of consciousness    Rapid heart rate    8354-8495 [employer] [address] [place] [address], [place] [address]. All rights reserved. This information is not intended as a substitute for professional medical care. Always follow your healthcare professional's instructions.        Pleurisy    If you have pleurisy, the lining around your lungs is inflamed. This is most often due to a viral infection or pneumonia. It usually lasts for 10 to 14 days. It may cause sharp pain with breathing, coughing, sneezing, and movement. Antibiotics  are usually not prescribed for this condition unless pneumonia is also present.  The following tips will help you care for your condition at home:    If symptoms are severe, rest at home for the first 2 to 3 days. When you resume activity, don't let yourself get too tired.    Do not smoke. Also avoid being exposed to secondhand smoke.    You may use over-the-counter medicines to control pain, unless another pain medicine was prescribed. (Note: If you have chronic liver or kidney disease or have ever had a stomach ulcer or gastrointestinal bleeding, talk with your healthcare provider before using these medicines. Also talk to your provider if you are taking medicine to prevent blood clots.) Aspirin should never be given to anyone younger than 18 years of age who is ill with a viral infection or fever. It may cause severe liver or brain damage.  Follow-up care  Follow up with your healthcare provider, or as advised.  When to seek medical advice  Call your healthcare provider right away if any of these occur:    Fever over 100.4 F (38 C)    Coughing up lots of colored sputum (mucus)    Redness, pain, or swelling of the leg  Call 911, or get immediate medical care  Contact emergency services right away if any of these occur:    Increasing shortness of breath    Increasing chest pain, or pain that spreads to the neck, arm, or back    Coughing up blood    0367-6622 [employer]. [address] [place] [address], [place] [address]. All rights reserved. This information is not intended as a substitute for professional medical care. Always follow your healthcare professional's instructions.

## 2017-06-01 NOTE — PROGRESS NOTES
SUBJECTIVE:                                                    Rosendo Dallas is a 36 year old male who presents to clinic today for the following health issues:      ABDOMINAL PAIN     Onset: 1 week ago    Description:   Character: Sharp  Location: right upper quadrant  Radiation: None    Intensity: moderate    Progression of Symptoms:  intermittent    Accompanying Signs & Symptoms:  Fever/Chills?: no   Gas/Bloating: YES  Nausea: YES  Vomitting: no   Diarrhea?: YES - IBS like  Constipation:no   Dysuria or Hematuria: no    History:   Trauma: no   Previous similar pain: YES- has IBS   Previous tests done: none    Precipitating factors:   Does the pain change with:     Food: no      BM: no     Urination: no     Alleviating factors:  nothing    Therapies Tried and outcome: nothing    LMP:  not applicable   Under right rib cage, varies intensity - mild discomfort constantly with sudden sharp intense pain in the area sporadically. Worse with lifting/twisting motions.  ?3-4 hours after eating.  Mild nausea.          Problem list and histories reviewed & adjusted, as indicated.  Additional history: as documented    BP Readings from Last 3 Encounters:   06/01/17 104/82   06/01/17 106/82   05/19/17 126/82    Wt Readings from Last 3 Encounters:   06/01/17 92.9 kg (204 lb 11.2 oz)   06/01/17 92.3 kg (203 lb 6.4 oz)   05/19/17 93.4 kg (206 lb)                    Reviewed and updated as needed this visit by clinical staff  Tobacco  Allergies  Meds  Med Hx  Surg Hx  Fam Hx  Soc Hx      Reviewed and updated as needed this visit by Provider  Tobacco  Allergies  Meds  Med Hx  Surg Hx  Fam Hx  Soc Hx        ROS:  Constitutional, HEENT, cardiovascular, pulmonary, gi and gu systems are negative, except as otherwise noted.    OBJECTIVE:                                                    /82 (BP Location: Right arm, Patient Position: Chair, Cuff Size: Adult Regular)  Pulse 67  Temp 98.2  F (36.8  C) (Oral)  Resp 12   "Ht 1.854 m (6' 1\")  Wt 92.9 kg (204 lb 11.2 oz)  SpO2 97%  BMI 27.01 kg/m2  Body mass index is 27.01 kg/(m^2).  GENERAL: alert and no distress  NECK: no adenopathy, no asymmetry, masses, or scars and thyroid normal to palpation  RESP: lungs clear to auscultation - no rales, rhonchi or wheezes  CV: regular rate and rhythm, normal S1 S2, no S3 or S4, no murmur, click or rub, no peripheral edema and peripheral pulses strong  ABDOMEN: soft, without hepatosplenomegaly or masses, tenderness RUQ without rebound or guarding, bowel sounds normal and tenderness underneath ribs when pushing up into rib area.  No point tenderness over the rib/costochondral margin.  MS: no gross musculoskeletal defects noted, no edema  PSYCH: mentation appears normal, affect normal/bright and anxious         ASSESSMENT/PLAN:                                                          1. RUQ abdominal pain  Evaluation of the liver/gallbladder planned.  Was seen at  this am and labs returned after his office visit - normal with exception of a high bilirubin.  swabr message sent.  Follow up labs planned next week - see below. ?related to abdominal wall muscles.  - US Abdomen Complete; Future  - Hepatic panel (Albumin, ALT, AST, Bili, Alk Phos, TP); Future  - JUST IN CASE; Future    2. Hyperbilirubinemia  Follow up labs next week - see above.   - Hepatic panel (Albumin, ALT, AST, Bili, Alk Phos, TP); Future  - Bilirubin direct; Future  - JUST IN CASE; Future    3. Lipid screening  - Lipid panel reflex to direct LDL; Future    Patient Instructions   Ultrasound abdomen recommended.   You can call [phone] to schedule this at the [facility] in [place] (formerly the [facility]).     For the discomfort, you can take Ibuprofen 600 mg (3 pills) three times a day with food for 3-5 days.            Dianne Gallegos MD  [facility]"

## 2017-06-01 NOTE — PATIENT INSTRUCTIONS
Ultrasound abdomen recommended.   You can call [phone] to schedule this at the [facility] in [place] (formerly the [facility]).     For the discomfort, you can take Ibuprofen 600 mg (3 pills) three times a day with food for 3-5 days.

## 2017-06-02 LAB — AMYLASE SERPL-CCNC: 37 U/L (ref 30–110)

## 2017-07-05 ENCOUNTER — OFFICE VISIT (OUTPATIENT)
Dept: URGENT CARE | Facility: URGENT CARE | Age: 37
End: 2017-07-05
Payer: COMMERCIAL

## 2017-07-05 VITALS
HEIGHT: 73 IN | HEART RATE: 60 BPM | OXYGEN SATURATION: 97 % | DIASTOLIC BLOOD PRESSURE: 82 MMHG | SYSTOLIC BLOOD PRESSURE: 118 MMHG | WEIGHT: 205.8 LBS | TEMPERATURE: 98.9 F | BODY MASS INDEX: 27.28 KG/M2 | RESPIRATION RATE: 20 BRPM

## 2017-07-05 DIAGNOSIS — J01.90 ACUTE SINUSITIS WITH SYMPTOMS > 10 DAYS: Primary | ICD-10-CM

## 2017-07-05 PROCEDURE — 99213 OFFICE O/P EST LOW 20 MIN: CPT | Performed by: FAMILY MEDICINE

## 2017-07-05 RX ORDER — AMOXICILLIN 875 MG
875 TABLET ORAL 2 TIMES DAILY
Qty: 20 TABLET | Refills: 0 | Status: SHIPPED | OUTPATIENT
Start: 2017-07-05 | End: 2017-07-15

## 2017-07-05 RX ORDER — FLUTICASONE PROPIONATE 50 MCG
2 SPRAY, SUSPENSION (ML) NASAL DAILY
Qty: 1 BOTTLE | Refills: 1 | Status: SHIPPED | OUTPATIENT
Start: 2017-07-05 | End: 2017-10-13

## 2017-07-05 NOTE — MR AVS SNAPSHOT
After Visit Summary   7/5/2017    Rosendo Dallas    MRN: [id number]           Patient Information     Date Of Birth          1980        Visit Information        Provider Department      7/5/2017 9:05 AM Albino Patel MD [facility]        Today's Diagnoses     Acute sinusitis with symptoms > 10 days    -  1      Care Instructions    Saline nasal rinses    Steam, Humidifier    follow up with the primary care provider if not better in 10-14 days.               Follow-ups after your visit        Who to contact     If you have questions or need follow up information about today's clinic visit or your schedule please contact [facility] directly at [phone].  Normal or non-critical lab and imaging results will be communicated to you by MyChart, letter or phone within 4 business days after the clinic has received the results. If you do not hear from us within 7 days, please contact the clinic through HEALBEhart or phone. If you have a critical or abnormal lab result, we will notify you by phone as soon as possible.  Submit refill requests through Delivery Agent or call your pharmacy and they will forward the refill request to us. Please allow 3 business days for your refill to be completed.          Additional Information About Your Visit        MyChart Information     Delivery Agent gives you secure access to your electronic health record. If you see a primary care provider, you can also send messages to your care team and make appointments. If you have questions, please call your primary care clinic.  If you do not have a primary care provider, please call [phone] and they will assist you.        Care EveryWhere ID     This is your Care EveryWhere ID. This could be used by other organizations to access your [place] medical records  [id number]        Your Vitals Were     Pulse Temperature Respirations Height Pulse Oximetry BMI (Body Mass Index)    60 98.9  F (37.2  C) (Oral) 20  "6' 1\" (1.854 m) 97% 27.15 kg/m2       Blood Pressure from Last 3 Encounters:   07/05/17 118/82   06/01/17 104/82   06/01/17 106/82    Weight from Last 3 Encounters:   07/05/17 205 lb 12.8 oz (93.4 kg)   06/01/17 204 lb 11.2 oz (92.9 kg)   06/01/17 203 lb 6.4 oz (92.3 kg)              Today, you had the following     No orders found for display         Today's Medication Changes          These changes are accurate as of: 7/5/17  9:49 AM.  If you have any questions, ask your nurse or doctor.               Start taking these medicines.        Dose/Directions    amoxicillin 875 MG tablet   Commonly known as:  AMOXIL   Used for:  Acute sinusitis with symptoms > 10 days   Started by:  Albino Patel MD        Dose:  875 mg   Take 1 tablet (875 mg) by mouth 2 times daily for 10 days   Quantity:  20 tablet   Refills:  0       fluticasone 50 MCG/ACT spray   Commonly known as:  FLONASE   Used for:  Acute sinusitis with symptoms > 10 days   Started by:  Albino Patel MD        Dose:  2 spray   Spray 2 sprays into both nostrils daily   Quantity:  1 Bottle   Refills:  1            Where to get your medicines      These medications were sent to [facility] - [address]   [address]  [address], [place] [address]     Phone:  [phone]     amoxicillin 875 MG tablet    fluticasone 50 MCG/ACT spray                Primary Care Provider Office Phone #    [facility] [phone]       No address on file        Equal Access to Services     [facility] AH: Hadii aad ku hadasho Soomaali, waaxda luqadaha, qaybta kaalmada adeegyaandi, waxay idijudi faust. So [facility] [phone].    ATENCIÓN: Si habla español, tiene a crump disposición servicios gratuitos de asistencia lingüística. Llame al [phone].    We comply with applicable federal civil rights laws and [place] laws. We do not discriminate on the basis of race, color, national origin, age, disability " sex, sexual orientation or gender identity.            Thank you!     Thank you for choosing [facility]  for your care. Our goal is always to provide you with excellent care. Hearing back from our patients is one way we can continue to improve our services. Please take a few minutes to complete the written survey that you may receive in the mail after your visit with us. Thank you!             Your Updated Medication List - Protect others around you: Learn how to safely use, store and throw away your medicines at www.disposemymeds.org.          This list is accurate as of: 7/5/17  9:49 AM.  Always use your most recent med list.                   Brand Name Dispense Instructions for use Diagnosis    amoxicillin 875 MG tablet    AMOXIL    20 tablet    Take 1 tablet (875 mg) by mouth 2 times daily for 10 days    Acute sinusitis with symptoms > 10 days       escitalopram 10 MG tablet    LEXAPRO    90 tablet    Take 1 tablet (10 mg) by mouth daily    Anxiety, Moderate single current episode of major depressive disorder (H)       fluticasone 50 MCG/ACT spray    FLONASE    1 Bottle    Spray 2 sprays into both nostrils daily    Acute sinusitis with symptoms > 10 days       omeprazole 10 MG CR capsule    priLOSEC    60 capsule    Take by mouth 30-60 minutes before a meal.

## 2017-07-05 NOTE — NURSING NOTE
"Chief Complaint   Patient presents with     Sinus Problem       Initial /82  Pulse 60  Temp 98.9  F (37.2  C) (Oral)  Resp 20  Ht 6' 1\" (1.854 m)  Wt 205 lb 12.8 oz (93.4 kg)  SpO2 97%  BMI 27.15 kg/m2 Estimated body mass index is 27.15 kg/(m^2) as calculated from the following:    Height as of this encounter: 6' 1\" (1.854 m).    Weight as of this encounter: 205 lb 12.8 oz (93.4 kg).  Medication Reconciliation: complete   Jimena GIRON, STEW,AAMA      "

## 2017-07-05 NOTE — PATIENT INSTRUCTIONS
Saline nasal rinses    Steam, Humidifier    follow up with the primary care provider if not better in 10-14 days.

## 2017-07-05 NOTE — PROGRESS NOTES
Pt presents in clinic today with cough and discoloration of mucous x 2 weeks. Would like to r/o sinusitis. Pt sates that he has failed over the counter antihistamine  Jimena GIRON CMA,AAMA

## 2017-07-12 ENCOUNTER — OFFICE VISIT (OUTPATIENT)
Dept: FAMILY MEDICINE | Facility: CLINIC | Age: 37
End: 2017-07-12
Payer: COMMERCIAL

## 2017-07-12 VITALS
OXYGEN SATURATION: 96 % | WEIGHT: 202 LBS | DIASTOLIC BLOOD PRESSURE: 82 MMHG | BODY MASS INDEX: 26.77 KG/M2 | HEIGHT: 73 IN | HEART RATE: 67 BPM | SYSTOLIC BLOOD PRESSURE: 118 MMHG | RESPIRATION RATE: 12 BRPM | TEMPERATURE: 98.3 F

## 2017-07-12 DIAGNOSIS — Z13.220 LIPID SCREENING: ICD-10-CM

## 2017-07-12 DIAGNOSIS — R05.9 COUGH: Primary | ICD-10-CM

## 2017-07-12 LAB
CHOLEST SERPL-MCNC: 184 MG/DL
HDLC SERPL-MCNC: 43 MG/DL
LDLC SERPL CALC-MCNC: 125 MG/DL
NONHDLC SERPL-MCNC: 141 MG/DL
TRIGL SERPL-MCNC: 79 MG/DL

## 2017-07-12 PROCEDURE — 99213 OFFICE O/P EST LOW 20 MIN: CPT | Performed by: NURSE PRACTITIONER

## 2017-07-12 PROCEDURE — 80061 LIPID PANEL: CPT | Performed by: FAMILY MEDICINE

## 2017-07-12 PROCEDURE — 36415 COLL VENOUS BLD VENIPUNCTURE: CPT | Performed by: FAMILY MEDICINE

## 2017-07-12 NOTE — MR AVS SNAPSHOT
"              After Visit Summary   7/12/2017    Rosendo Dallas    MRN: [id number]           Patient Information     Date Of Birth          1980        Visit Information        Provider Department      7/12/2017 8:20 AM Christie Lopez NP [facility]        Today's Diagnoses     Cough    -  1    Lipid screening           Follow-ups after your visit        Follow-up notes from your care team     Return if symptoms worsen or fail to improve.      Who to contact     If you have questions or need follow up information about today's clinic visit or your schedule please contact [facility] directly at [phone].  Normal or non-critical lab and imaging results will be communicated to you by MyChart, letter or phone within 4 business days after the clinic has received the results. If you do not hear from us within 7 days, please contact the clinic through [facility]ation[facility] or phone. If you have a critical or abnormal lab result, we will notify you by phone as soon as possible.  Submit refill requests through Genomed or call your pharmacy and they will forward the refill request to us. Please allow 3 business days for your refill to be completed.          Additional Information About Your Visit        MyChart Information     Genomed gives you secure access to your electronic health record. If you see a primary care provider, you can also send messages to your care team and make appointments. If you have questions, please call your primary care clinic.  If you do not have a primary care provider, please call [phone] and they will assist you.        Care EveryWhere ID     This is your Care EveryWhere ID. This could be used by other organizations to access your [place] medical records  [id number]        Your Vitals Were     Pulse Temperature Respirations Height Pulse Oximetry BMI (Body Mass Index)    67 98.3  F (36.8  C) (Oral) 12 1.854 m (6' 1\") 96% 26.65 kg/m2       Blood Pressure from Last 3 " Encounters:   07/12/17 118/82   07/05/17 118/82   06/01/17 104/82    Weight from Last 3 Encounters:   07/12/17 91.6 kg (202 lb)   07/05/17 93.4 kg (205 lb 12.8 oz)   06/01/17 92.9 kg (204 lb 11.2 oz)              We Performed the Following     Lipid panel reflex to direct LDL        Primary Care Provider Office Phone #    [facility] [phone]       No address on file        Equal Access to Services     [facility] DEL[facility]LE : Hadii greg ku hadasho Soomaali, waaxda luqadaha, qaybta kaalmada adeegyada, joyce faust. So [facility] [phone].    ATENCIÓN: Si habla español, tiene a crump disposición servicios gratuitos de asistencia lingüística. Isakame al [phone].    We comply with applicable federal civil rights laws and [place] laws. We do not discriminate on the basis of race, color, national origin, age, disability sex, sexual orientation or gender identity.            Thank you!     Thank you for choosing [facility]  for your care. Our goal is always to provide you with excellent care. Hearing back from our patients is one way we can continue to improve our services. Please take a few minutes to complete the written survey that you may receive in the mail after your visit with us. Thank you!             Your Updated Medication List - Protect others around you: Learn how to safely use, store and throw away your medicines at www.disposemymeds.org.          This list is accurate as of: 7/12/17  8:32 AM.  Always use your most recent med list.                   Brand Name Dispense Instructions for use Diagnosis    amoxicillin 875 MG tablet    AMOXIL    20 tablet    Take 1 tablet (875 mg) by mouth 2 times daily for 10 days    Acute sinusitis with symptoms > 10 days       escitalopram 10 MG tablet    LEXAPRO    90 tablet    Take 1 tablet (10 mg) by mouth daily    Anxiety, Moderate single current episode of major depressive disorder (H)       fluticasone 50 MCG/ACT spray     FLONASE    1 Bottle    Spray 2 sprays into both nostrils daily    Acute sinusitis with symptoms > 10 days       omeprazole 10 MG CR capsule    priLOSEC    60 capsule    Take by mouth 30-60 minutes before a meal.

## 2017-07-12 NOTE — NURSING NOTE
"Chief Complaint   Patient presents with     Cough       Initial /82 (BP Location: Right arm, Patient Position: Chair, Cuff Size: Adult Regular)  Pulse 67  Temp 98.3  F (36.8  C) (Oral)  Resp 12  Ht 1.854 m (6' 1\")  Wt 91.6 kg (202 lb)  SpO2 96%  BMI 26.65 kg/m2 Estimated body mass index is 26.65 kg/(m^2) as calculated from the following:    Height as of this encounter: 1.854 m (6' 1\").    Weight as of this encounter: 91.6 kg (202 lb).  Medication Reconciliation: bart Barnes        "

## 2017-07-12 NOTE — PROGRESS NOTES
SUBJECTIVE:                                                    Rosendo Dallas is a 36 year old male who presents to clinic today for the following health issues:      Cough for 3 1/2 weeks now  -on an antibiotic which has helped with the mucus and phlegm  But still a cough, less productive. Worse in the morning and sometimes after eating. No fever, chills. Denies wheezing, has a sensation of needing to cough like a 'tickle', then coughs.  Still has a few days left of amoxicillin.  Has not tried OTC products for cough. Having post-nasal drip-uses saline nasal spray which helps.  Eyes feel heavy, no sinus pain. Has some allergies early-mid summer.     Pt fasting-is due       Problem list and histories reviewed & adjusted, as indicated.  Additional history: as documented    Patient Active Problem List   Diagnosis     Vitamin D deficiency     Muscle pain     Anxiety     Major depressive disorder, single episode, moderate (H)     History reviewed. No pertinent surgical history.    Social History   Substance Use Topics     Smoking status: Never Smoker     Smokeless tobacco: Not on file     Alcohol use Yes      Comment: occassional     Family History   Problem Relation Age of Onset     Hypertension Mother      Hyperlipidemia Mother      Depression Other      dad's side     DIABETES No family hx of      Coronary Artery Disease No family hx of      CEREBROVASCULAR DISEASE No family hx of      Breast Cancer No family hx of      Colon Cancer No family hx of      Prostate Cancer No family hx of      Thyroid Disease No family hx of          Current Outpatient Prescriptions   Medication Sig Dispense Refill     amoxicillin (AMOXIL) 875 MG tablet Take 1 tablet (875 mg) by mouth 2 times daily for 10 days 20 tablet 0     fluticasone (FLONASE) 50 MCG/ACT spray Spray 2 sprays into both nostrils daily 1 Bottle 1     escitalopram (LEXAPRO) 10 MG tablet Take 1 tablet (10 mg) by mouth daily 90 tablet 1     omeprazole (PRILOSEC) 10 MG  "capsule Take by mouth 30-60 minutes before a meal. 60 capsule      No Known Allergies    Reviewed and updated as needed this visit by clinical staff  Tobacco  Allergies  Meds  Problems  Med Hx  Surg Hx  Fam Hx  Soc Hx        Reviewed and updated as needed this visit by Provider  Allergies  Meds  Problems         ROS:  Constitutional, HEENT, cardiovascular, pulmonary are negative, except as otherwise noted.    OBJECTIVE:     /82 (BP Location: Right arm, Patient Position: Chair, Cuff Size: Adult Regular)  Pulse 67  Temp 98.3  F (36.8  C) (Oral)  Resp 12  Ht 1.854 m (6' 1\")  Wt 91.6 kg (202 lb)  SpO2 96%  BMI 26.65 kg/m2  Body mass index is 26.65 kg/(m^2).  GENERAL: healthy, alert and no distress  EYES: Eyes grossly normal to inspection, PERRL and conjunctivae and sclerae normal  HENT: ear canals and TM's normal, nose and mouth without ulcers or lesions. Posterior pharynx slight erythema, nasal mucosa slightly red and swollen  NECK: no adenopathy, no asymmetry, masses, or scars and thyroid normal to palpation  RESP: lungs clear to auscultation - no rales, rhonchi or wheezes  CV: regular rate and rhythm, normal S1 S2, no S3 or S4, no murmur, click or rub  MS: no gross musculoskeletal defects noted    Diagnostic Test Results:  none     ASSESSMENT/PLAN:         1. Cough  Likely from sinus infection and post nasal drip. Continue on antibiotics he was originally prescribed for 2 more days. No other medication indicated for cough. Continue Flonase and saline nasal spray. This should resolve over the next few weeks.     2. Lipid screening  Labs drawn.   - Lipid panel reflex to direct LDL    FUTURE APPOINTMENTS:       - Follow-up visit prn    BINTA Hazel, NP-C  [facility]    "

## 2017-07-17 NOTE — PROGRESS NOTES
Your cholesterol level is in the goal range for you.  Recheck again at age 40 is recommended.  Please call or MyChart message me if you have any questions.   PSK

## 2017-07-18 ENCOUNTER — MYC MEDICAL ADVICE (OUTPATIENT)
Dept: FAMILY MEDICINE | Facility: CLINIC | Age: 37
End: 2017-07-18

## 2017-07-19 ENCOUNTER — OFFICE VISIT (OUTPATIENT)
Dept: FAMILY MEDICINE | Facility: CLINIC | Age: 37
End: 2017-07-19
Payer: COMMERCIAL

## 2017-07-19 ENCOUNTER — MYC REFILL (OUTPATIENT)
Dept: FAMILY MEDICINE | Facility: CLINIC | Age: 37
End: 2017-07-19

## 2017-07-19 VITALS
BODY MASS INDEX: 26.73 KG/M2 | OXYGEN SATURATION: 99 % | TEMPERATURE: 98.3 F | DIASTOLIC BLOOD PRESSURE: 64 MMHG | RESPIRATION RATE: 16 BRPM | HEIGHT: 73 IN | WEIGHT: 201.7 LBS | SYSTOLIC BLOOD PRESSURE: 104 MMHG | HEART RATE: 66 BPM

## 2017-07-19 DIAGNOSIS — H65.192 OTHER ACUTE NONSUPPURATIVE OTITIS MEDIA OF LEFT EAR, RECURRENCE NOT SPECIFIED: Primary | ICD-10-CM

## 2017-07-19 DIAGNOSIS — F41.9 ANXIETY: ICD-10-CM

## 2017-07-19 DIAGNOSIS — F32.1 MODERATE SINGLE CURRENT EPISODE OF MAJOR DEPRESSIVE DISORDER (H): ICD-10-CM

## 2017-07-19 PROCEDURE — 99213 OFFICE O/P EST LOW 20 MIN: CPT | Performed by: NURSE PRACTITIONER

## 2017-07-19 RX ORDER — ESCITALOPRAM OXALATE 10 MG/1
10 TABLET ORAL DAILY
Qty: 90 TABLET | Refills: 1 | Status: CANCELLED | OUTPATIENT
Start: 2017-07-19

## 2017-07-19 RX ORDER — AMOXICILLIN 500 MG/1
500 CAPSULE ORAL 2 TIMES DAILY
Qty: 10 CAPSULE | Refills: 0 | Status: SHIPPED | OUTPATIENT
Start: 2017-07-19 | End: 2017-07-24

## 2017-07-19 ASSESSMENT — PAIN SCALES - GENERAL: PAINLEVEL: NO PAIN (0)

## 2017-07-19 NOTE — PROGRESS NOTES
SUBJECTIVE:                                                    Rosendo Dallas is a 36 year old male who presents to clinic today for the following health issues:      Acute Illness   Acute illness concerns: Left Ear  Onset: 3 days    Fever: no    Chills/Sweats: no    Headache (location?): YES    Sinus Pressure:YES    Conjunctivitis:  no    Ear Pain: YES: left    Rhinorrhea: YES- has improved, with flonase and saline    Congestion: YES- has improved, with flonase    Sore Throat: no     Cough: YES-productive of clear sputum    Wheeze: no    Decreased Appetite: YES- slight    Nausea: no    Vomiting: no    Diarrhea:  no    Dysuria/Freq.: no    Fatigue/Achiness: YES    Sick/Strep Exposure: no     Therapies Tried and outcome: Ibuprofen - slight improvement noted    Both ears feel full, but left has pain now. Cough has gotten better, more throat clearing. [place]         Problem list and histories reviewed & adjusted, as indicated.  Additional history: as documented    Patient Active Problem List   Diagnosis     Vitamin D deficiency     Muscle pain     Anxiety     Major depressive disorder, single episode, moderate (H)     History reviewed. No pertinent surgical history.    Social History   Substance Use Topics     Smoking status: Never Smoker     Smokeless tobacco: Never Used     Alcohol use Yes      Comment: occassional     Family History   Problem Relation Age of Onset     Hypertension Mother      Hyperlipidemia Mother      Depression Other      dad's side     DIABETES No family hx of      Coronary Artery Disease No family hx of      CEREBROVASCULAR DISEASE No family hx of      Breast Cancer No family hx of      Colon Cancer No family hx of      Prostate Cancer No family hx of      Thyroid Disease No family hx of          Current Outpatient Prescriptions   Medication Sig Dispense Refill     amoxicillin (AMOXIL) 500 MG capsule Take 1 capsule (500  "mg) by mouth 2 times daily for 5 days 10 capsule 0     fluticasone (FLONASE) 50 MCG/ACT spray Spray 2 sprays into both nostrils daily 1 Bottle 1     escitalopram (LEXAPRO) 10 MG tablet Take 1 tablet (10 mg) by mouth daily 90 tablet 1     omeprazole (PRILOSEC) 10 MG capsule Take by mouth 30-60 minutes before a meal. 60 capsule      No Known Allergies      Reviewed and updated as needed this visit by clinical staffTobacco  Allergies  Meds  Problems  Med Hx  Surg Hx  Fam Hx  Soc Hx        Reviewed and updated as needed this visit by Provider  Allergies  Meds  Problems         ROS:  Constitutional, HEENT, cardiovascular, pulmonary systems are negative, except as otherwise noted.      OBJECTIVE:   /64 (BP Location: Right arm, Patient Position: Right side, Cuff Size: Adult Regular)  Pulse 66  Temp 98.3  F (36.8  C) (Oral)  Resp 16  Ht 1.854 m (6' 1\")  Wt 91.5 kg (201 lb 11.2 oz)  SpO2 99%  BMI 26.61 kg/m2  Body mass index is 26.61 kg/(m^2).  GENERAL: healthy, alert and no distress  EYES: Eyes grossly normal to inspection, PERRL and conjunctivae and sclerae normal  HENT: ear canals and TM's normal, nose and mouth without ulcers or lesions  NECK: no adenopathy, no asymmetry, masses, or scars and thyroid normal to palpation  RESP: lungs clear to auscultation - no rales, rhonchi or wheezes  CV: regular rate and rhythm, normal S1 S2, no S3 or S4, no murmur, click or rub, no peripheral edema and peripheral pulses strong  MS: no gross musculoskeletal defects noted, no edema    Diagnostic Test Results:  none     ASSESSMENT/PLAN:       1. Other acute nonsuppurative otitis media of left ear, recurrence not specified  Treat with antibiotic. Encouraged yogurt/probiotics as he was recently on antibiotics also.   - amoxicillin (AMOXIL) 500 MG capsule; Take 1 capsule (500 mg) by mouth 2 times daily for 5 days  Dispense: 10 capsule; Refill: 0    FUTURE APPOINTMENTS:       - Follow-up visit prn not " improving    BINTA Hazel, NP-C  [facility]

## 2017-07-19 NOTE — NURSING NOTE
"Chief Complaint   Patient presents with     Ear Problem       Initial /64 (BP Location: Right arm, Patient Position: Right side, Cuff Size: Adult Regular)  Pulse 66  Temp 98.3  F (36.8  C) (Oral)  Resp 16  Ht 1.854 m (6' 1\")  Wt 91.5 kg (201 lb 11.2 oz)  SpO2 99%  BMI 26.61 kg/m2 Estimated body mass index is 26.61 kg/(m^2) as calculated from the following:    Height as of this encounter: 1.854 m (6' 1\").    Weight as of this encounter: 91.5 kg (201 lb 11.2 oz).  Medication Reconciliation: complete     Will Alfredo CONLEY      "

## 2017-07-19 NOTE — MR AVS SNAPSHOT
"              After Visit Summary   7/19/2017    Rosendo Dallas    MRN: [id number]           Patient Information     Date Of Birth          1980        Visit Information        Provider Department      7/19/2017 10:20 AM Christie Lopez NP [facility]        Today's Diagnoses     Other acute nonsuppurative otitis media of left ear, recurrence not specified    -  1       Follow-ups after your visit        Who to contact     If you have questions or need follow up information about today's clinic visit or your schedule please contact [facility] directly at [phone].  Normal or non-critical lab and imaging results will be communicated to you by [employer]hart, letter or phone within 4 business days after the clinic has received the results. If you do not hear from us within 7 days, please contact the clinic through [employer]hart or phone. If you have a critical or abnormal lab result, we will notify you by phone as soon as possible.  Submit refill requests through [employer] or call your pharmacy and they will forward the refill request to us. Please allow 3 business days for your refill to be completed.          Additional Information About Your Visit        MyChart Information     [employer] gives you secure access to your electronic health record. If you see a primary care provider, you can also send messages to your care team and make appointments. If you have questions, please call your primary care clinic.  If you do not have a primary care provider, please call [phone] and they will assist you.        Care EveryWhere ID     This is your Care EveryWhere ID. This could be used by other organizations to access your [place] medical records  [id number]        Your Vitals Were     Pulse Temperature Respirations Height Pulse Oximetry BMI (Body Mass Index)    66 98.3  F (36.8  C) (Oral) 16 1.854 m (6' 1\") 99% 26.61 kg/m2       Blood Pressure from Last 3 Encounters:   07/19/17 104/64   07/12/17 " 118/82   07/05/17 118/82    Weight from Last 3 Encounters:   07/19/17 91.5 kg (201 lb 11.2 oz)   07/12/17 91.6 kg (202 lb)   07/05/17 93.4 kg (205 lb 12.8 oz)              Today, you had the following     No orders found for display         Today's Medication Changes          These changes are accurate as of: 7/19/17 10:39 AM.  If you have any questions, ask your nurse or doctor.               Start taking these medicines.        Dose/Directions    amoxicillin 500 MG capsule   Commonly known as:  AMOXIL   Used for:  Other acute nonsuppurative otitis media of left ear, recurrence not specified   Started by:  Christie Lopez, BRIANNA        Dose:  500 mg   Take 1 capsule (500 mg) by mouth 2 times daily for 5 days   Quantity:  10 capsule   Refills:  0            Where to get your medicines      These medications were sent to [facility] [address] [place] [address] [place] [address] [employer] [address] [facility] [address] [facility]  [address], [facility] [address]     Phone:  [phone]     amoxicillin 500 MG capsule                Primary Care Provider Office Phone #    [facility] [phone]       No address on file        Equal Access to Services     FILI DELVALLE AH: Asha camposo Soomaali, waaxda luqadaha, qaybta kaalmada adeegyada, joyce faust. So [facility] [phone].    ATENCIÓN: Si habla español, tiene a crump disposición servicios gratuitos de asistencia lingüística. Llame al [phone].    We comply with applicable federal civil rights laws and [place] laws. We do not discriminate on the basis of race, color, national origin, age, disability sex, sexual orientation or gender identity.            Thank you!     Thank you for choosing [facility]  for your care. Our goal is always to provide you with excellent care. Hearing back from our patients is one way we can continue to improve our services. Please take a few minutes to complete the written survey  that you may receive in the mail after your visit with us. Thank you!             Your Updated Medication List - Protect others around you: Learn how to safely use, store and throw away your medicines at www.disposemymeds.org.          This list is accurate as of: 7/19/17 10:39 AM.  Always use your most recent med list.                   Brand Name Dispense Instructions for use Diagnosis    amoxicillin 500 MG capsule    AMOXIL    10 capsule    Take 1 capsule (500 mg) by mouth 2 times daily for 5 days    Other acute nonsuppurative otitis media of left ear, recurrence not specified       escitalopram 10 MG tablet    LEXAPRO    90 tablet    Take 1 tablet (10 mg) by mouth daily    Anxiety, Moderate single current episode of major depressive disorder (H)       fluticasone 50 MCG/ACT spray    FLONASE    1 Bottle    Spray 2 sprays into both nostrils daily    Acute sinusitis with symptoms > 10 days       omeprazole 10 MG CR capsule    priLOSEC    60 capsule    Take by mouth 30-60 minutes before a meal.

## 2017-07-19 NOTE — TELEPHONE ENCOUNTER
The following prescription was sent to [facility]:    escitalopram (LEXAPRO) 10 MG tablet 90 tablet 1 3/13/2017  No   Sig: Take 1 tablet (10 mg) by mouth daily   Class: E-Prescribe   Route: Oral   Order: [id number]   E-Prescribing Status: Receipt confirmed by pharmacy (3/13/2017  9:37 AM CDT)     Patient should have refills available till September.    Amy Neri RN

## 2017-07-19 NOTE — TELEPHONE ENCOUNTER
escitalopram (LEXAPRO) 10 MG tablet     Last Written Prescription Date: 3/13/17  Last Fill Quantity: 90, # refills: 1  Last Office Visit with G primary care provider:  7/19/17        Last PHQ-9 score on record=   PHQ-9 SCORE 3/13/2017   Total Score 2

## 2017-07-19 NOTE — TELEPHONE ENCOUNTER
Message from MyChart:  Original authorizing provider: Dianne Gallegos MD    Rosendo Dallas would like a refill of the following medications:  escitalopram (LEXAPRO) 10 MG tablet [Dianne Gallegos MD]    Preferred pharmacy: [facility] [address] [place] [address] [address] [facility] [address]    Comment:

## 2017-10-13 ENCOUNTER — OFFICE VISIT (OUTPATIENT)
Dept: FAMILY MEDICINE | Facility: CLINIC | Age: 37
End: 2017-10-13
Payer: COMMERCIAL

## 2017-10-13 VITALS
BODY MASS INDEX: 26.78 KG/M2 | HEART RATE: 84 BPM | TEMPERATURE: 97.7 F | SYSTOLIC BLOOD PRESSURE: 110 MMHG | DIASTOLIC BLOOD PRESSURE: 80 MMHG | WEIGHT: 203 LBS

## 2017-10-13 DIAGNOSIS — R11.0 NAUSEA: Primary | ICD-10-CM

## 2017-10-13 DIAGNOSIS — R19.7 DIARRHEA, UNSPECIFIED TYPE: ICD-10-CM

## 2017-10-13 DIAGNOSIS — M79.10 MYALGIA: ICD-10-CM

## 2017-10-13 PROCEDURE — 99213 OFFICE O/P EST LOW 20 MIN: CPT | Performed by: PHYSICIAN ASSISTANT

## 2017-10-13 ASSESSMENT — PATIENT HEALTH QUESTIONNAIRE - PHQ9
SUM OF ALL RESPONSES TO PHQ QUESTIONS 1-9: 5
5. POOR APPETITE OR OVEREATING: SEVERAL DAYS

## 2017-10-13 ASSESSMENT — ANXIETY QUESTIONNAIRES
1. FEELING NERVOUS, ANXIOUS, OR ON EDGE: SEVERAL DAYS
3. WORRYING TOO MUCH ABOUT DIFFERENT THINGS: SEVERAL DAYS
GAD7 TOTAL SCORE: 5
5. BEING SO RESTLESS THAT IT IS HARD TO SIT STILL: NOT AT ALL
2. NOT BEING ABLE TO STOP OR CONTROL WORRYING: SEVERAL DAYS
7. FEELING AFRAID AS IF SOMETHING AWFUL MIGHT HAPPEN: NOT AT ALL
IF YOU CHECKED OFF ANY PROBLEMS ON THIS QUESTIONNAIRE, HOW DIFFICULT HAVE THESE PROBLEMS MADE IT FOR YOU TO DO YOUR WORK, TAKE CARE OF THINGS AT HOME, OR GET ALONG WITH OTHER PEOPLE: SOMEWHAT DIFFICULT
6. BECOMING EASILY ANNOYED OR IRRITABLE: SEVERAL DAYS

## 2017-10-13 NOTE — PROGRESS NOTES
SUBJECTIVE:   Rosendo Dallas is a 36 year old male who presents to clinic today for the following health issues:      Acute Illness   Acute illness concerns: nausea, chills and fatigue   Onset: 2 weeks     Fever: YES- pt reported low grade    Chills/Sweats: YES    Headache (location?): YES    Sinus Pressure:no    Conjunctivitis:  no    Ear Pain: no    Rhinorrhea: YES    Congestion: no    Sore Throat: no     Cough: no    Wheeze: no    Decreased Appetite: YES    Nausea: YES    Vomiting: no    Diarrhea:  YES    Dysuria/Freq.: no    Fatigue/Achiness: YES    Sick/Strep Exposure: no      Therapies Tried and outcome: na    2 week hx of mild myalgias, low grade temp and headache followed by now 5 days of diarrhea and nausea.  Diarrhea is watery with some mucous.  No bolod noted.  No recent travel or antibiotic use.      Problem list and histories reviewed & adjusted, as indicated.  Additional history: as documented    Patient Active Problem List   Diagnosis     Vitamin D deficiency     Muscle pain     Anxiety     Major depressive disorder, single episode, moderate (H)     No past surgical history on file.    Social History   Substance Use Topics     Smoking status: Never Smoker     Smokeless tobacco: Never Used     Alcohol use Yes      Comment: occassional     Family History   Problem Relation Age of Onset     Hypertension Mother      Hyperlipidemia Mother      Depression Other      dad's side     DIABETES No family hx of      Coronary Artery Disease No family hx of      CEREBROVASCULAR DISEASE No family hx of      Breast Cancer No family hx of      Colon Cancer No family hx of      Prostate Cancer No family hx of      Thyroid Disease No family hx of          Current Outpatient Prescriptions   Medication Sig Dispense Refill     escitalopram (LEXAPRO) 10 MG tablet Take 1 tablet (10 mg) by mouth daily 90 tablet 1     omeprazole (PRILOSEC) 10 MG capsule Take by mouth 30-60 minutes before a meal. 60 capsule      No Known  Allergies      Reviewed and updated as needed this visit by clinical staff     Reviewed and updated as needed this visit by Provider         ROS:  Constitutional, HEENT, cardiovascular, pulmonary, gi and gu systems are negative, except as otherwise noted.      OBJECTIVE:   /80 (BP Location: Left arm, Patient Position: Chair, Cuff Size: Adult Regular)  Pulse 84  Temp 97.7  F (36.5  C) (Tympanic)  Wt 203 lb (92.1 kg)  BMI 26.78 kg/m2  Body mass index is 26.78 kg/(m^2).  GENERAL: healthy, alert and no distress  EYES: Eyes grossly normal to inspection  HENT: ear canals and TM's normal, nose and mouth without ulcers or lesions  RESP: lungs clear to auscultation - no rales, rhonchi or wheezes  CV: regular rate and rhythm, normal S1 S2, no S3 or S4, no murmur  ABDOMEN: soft, nontender, no hepatosplenomegaly, no masses and bowel sounds normal  PSYCH: mentation appears normal, affect normal/bright    Diagnostic Test Results:  none     ASSESSMENT/PLAN:       1. Nausea    2. Diarrhea, unspecified type  Symptoms sound viral in nature.  Advise bland diet, fluids and rest.  Will get stool culture if no improvement in 48 hours for assessment.    - Enteric Bacteria and Virus Panel by ASHLEY Stool; Future    3. Myalgia        See Patient Instructions    Anand Mcclendon PA-C  [facility]

## 2017-10-13 NOTE — NURSING NOTE
"Chief Complaint   Patient presents with     Nausea       Initial /80 (BP Location: Left arm, Patient Position: Chair, Cuff Size: Adult Regular)  Pulse 84  Temp 97.7  F (36.5  C) (Tympanic)  Wt 203 lb (92.1 kg)  BMI 26.78 kg/m2 Estimated body mass index is 26.78 kg/(m^2) as calculated from the following:    Height as of 7/19/17: 6' 1\" (1.854 m).    Weight as of this encounter: 203 lb (92.1 kg).  Medication Reconciliation: complete  "

## 2017-10-13 NOTE — MR AVS SNAPSHOT
After Visit Summary   10/13/2017    Rosendo Dallas    MRN: [id number]           Patient Information     Date Of Birth          1980        Visit Information        Provider Department      10/13/2017 3:00 PM Anand Mcclendon PA-C [facility]        Today's Diagnoses     Nausea    -  1    Diarrhea, unspecified type        Myalgia           Follow-ups after your visit        Your next 10 appointments already scheduled     Oct 16, 2017  1:40 PM CDT   Josh Short [facility] Lopez [facility]    [address] [facility] [address] [phone]              Who to contact     If you have questions or need follow up information about today's clinic visit or your schedule please contact [facility] directly at [phone].  Normal or non-critical lab and imaging results will be communicated to you by MyChart, letter or phone within 4 business days after the clinic has received the results. If you do not hear from us within 7 days, please contact the clinic through "[employer]hart or phone. If you have a critical or abnormal lab result, we will notify you by phone as soon as possible.  Submit refill requests through [employer] or call your pharmacy and they will forward the refill request to us. Please allow 3 business days for your refill to be completed.          Additional Information About Your Visit        MyChart Information     [employer] gives you secure access to your electronic health record. If you see a primary care provider, you can also send messages to your care team and make appointments. If you have questions, please call your primary care clinic.  If you do not have a primary care provider, please call [phone] and they will assist you.        Care EveryWhere ID     This is your Care EveryWhere ID. This could be used by other organizations to access your [facility]  records  [id number]        Your Vitals Were     Pulse Temperature BMI (Body Mass Index)             84 97.7  F (36.5  C) (Tympanic) 26.78 kg/m2          Blood Pressure from Last 3 Encounters:   10/13/17 110/80   07/19/17 104/64   07/12/17 118/82    Weight from Last 3 Encounters:   10/13/17 203 lb (92.1 kg)   07/19/17 201 lb 11.2 oz (91.5 kg)   07/12/17 202 lb (91.6 kg)               Primary Care Provider Office Phone # Fax #    [facility] [phone] [address] [address]        Equal Access to Services     FILI DELVALLE : Asha camposo Soleda, waaxda luqadaha, qaybta kaalmada ademelidayaandi, joyce faust. So [facility] [phone].    ATENCIÓN: Si habla español, tiene a crump disposición servicios gratuitos de asistencia lingüística. [facility] [phone].    We comply with applicable federal civil rights laws and [place] laws. We do not discriminate on the basis of race, color, national origin, age, disability, sex, sexual orientation, or gender identity.            Thank you!     Thank you for choosing [facility]  for your care. Our goal is always to provide you with excellent care. Hearing back from our patients is one way we can continue to improve our services. Please take a few minutes to complete the written survey that you may receive in the mail after your visit with us. Thank you!             Your Updated Medication List - Protect others around you: Learn how to safely use, store and throw away your medicines at www.disposemymeds.org.          This list is accurate as of: 10/13/17 11:59 PM.  Always use your most recent med list.                   Brand Name Dispense Instructions for use Diagnosis    escitalopram 10 MG tablet    LEXAPRO    90 tablet    Take 1 tablet (10 mg) by mouth daily    Anxiety, Moderate single current episode of major depressive disorder (H)       omeprazole 10 MG CR capsule     priLOSEC    60 capsule    Take by mouth 30-60 minutes before a meal.

## 2017-10-14 ASSESSMENT — ANXIETY QUESTIONNAIRES: GAD7 TOTAL SCORE: 5

## 2017-10-23 ENCOUNTER — OFFICE VISIT (OUTPATIENT)
Dept: FAMILY MEDICINE | Facility: CLINIC | Age: 37
End: 2017-10-23
Payer: COMMERCIAL

## 2017-10-23 VITALS
HEART RATE: 78 BPM | TEMPERATURE: 98.1 F | WEIGHT: 200.4 LBS | OXYGEN SATURATION: 94 % | DIASTOLIC BLOOD PRESSURE: 77 MMHG | HEIGHT: 73 IN | BODY MASS INDEX: 26.56 KG/M2 | RESPIRATION RATE: 14 BRPM | SYSTOLIC BLOOD PRESSURE: 125 MMHG

## 2017-10-23 DIAGNOSIS — F41.9 ANXIETY: ICD-10-CM

## 2017-10-23 DIAGNOSIS — R11.0 NAUSEA: ICD-10-CM

## 2017-10-23 DIAGNOSIS — Z23 NEED FOR PROPHYLACTIC VACCINATION AND INOCULATION AGAINST INFLUENZA: Primary | ICD-10-CM

## 2017-10-23 DIAGNOSIS — F32.1 MODERATE SINGLE CURRENT EPISODE OF MAJOR DEPRESSIVE DISORDER (H): ICD-10-CM

## 2017-10-23 LAB
BASOPHILS # BLD AUTO: 0 10E9/L (ref 0–0.2)
BASOPHILS NFR BLD AUTO: 0.3 %
DIFFERENTIAL METHOD BLD: NORMAL
EOSINOPHIL # BLD AUTO: 0.1 10E9/L (ref 0–0.7)
EOSINOPHIL NFR BLD AUTO: 1.3 %
ERYTHROCYTE [DISTWIDTH] IN BLOOD BY AUTOMATED COUNT: 12.4 % (ref 10–15)
HCT VFR BLD AUTO: 45.7 % (ref 40–53)
HGB BLD-MCNC: 16.2 G/DL (ref 13.3–17.7)
LIPASE SERPL-CCNC: 136 U/L (ref 73–393)
LYMPHOCYTES # BLD AUTO: 1.3 10E9/L (ref 0.8–5.3)
LYMPHOCYTES NFR BLD AUTO: 20.3 %
MCH RBC QN AUTO: 31.2 PG (ref 26.5–33)
MCHC RBC AUTO-ENTMCNC: 35.4 G/DL (ref 31.5–36.5)
MCV RBC AUTO: 88 FL (ref 78–100)
MONOCYTES # BLD AUTO: 0.3 10E9/L (ref 0–1.3)
MONOCYTES NFR BLD AUTO: 4.7 %
NEUTROPHILS # BLD AUTO: 4.7 10E9/L (ref 1.6–8.3)
NEUTROPHILS NFR BLD AUTO: 73.4 %
PLATELET # BLD AUTO: 263 10E9/L (ref 150–450)
RBC # BLD AUTO: 5.19 10E12/L (ref 4.4–5.9)
WBC # BLD AUTO: 6.4 10E9/L (ref 4–11)

## 2017-10-23 PROCEDURE — 87338 HPYLORI STOOL AG IA: CPT | Performed by: PHYSICIAN ASSISTANT

## 2017-10-23 PROCEDURE — 36415 COLL VENOUS BLD VENIPUNCTURE: CPT | Performed by: PHYSICIAN ASSISTANT

## 2017-10-23 PROCEDURE — 99214 OFFICE O/P EST MOD 30 MIN: CPT | Performed by: PHYSICIAN ASSISTANT

## 2017-10-23 PROCEDURE — 80050 GENERAL HEALTH PANEL: CPT | Performed by: PHYSICIAN ASSISTANT

## 2017-10-23 PROCEDURE — 83690 ASSAY OF LIPASE: CPT | Performed by: PHYSICIAN ASSISTANT

## 2017-10-23 PROCEDURE — 93000 ELECTROCARDIOGRAM COMPLETE: CPT | Performed by: PHYSICIAN ASSISTANT

## 2017-10-23 RX ORDER — DICYCLOMINE HCL 20 MG
20 TABLET ORAL 4 TIMES DAILY PRN
Qty: 40 TABLET | Refills: 0 | Status: SHIPPED | OUTPATIENT
Start: 2017-10-23 | End: 2018-01-31

## 2017-10-23 RX ORDER — ESCITALOPRAM OXALATE 10 MG/1
10 TABLET ORAL DAILY
Qty: 90 TABLET | Refills: 1 | Status: SHIPPED | OUTPATIENT
Start: 2017-10-23 | End: 2019-04-12

## 2017-10-23 NOTE — LETTER
My Depression Action Plan  Name: Rosendo Dallas   Date of Birth 1980  Date: 10/23/2017    My doctor: [facility] [place]   My clinic: [address] [address]  [phone]          GREEN    ZONE   Good Control    What it looks like:     Things are going generally well. You have normal up s and down s. You may even feel depressed from time to time, but bad moods usually last less than a day.   What you need to do:  1. Continue to care for yourself (see self care plan)  2. Check your depression survival kit and update it as needed  3. Follow your physician s recommendations including any medication.  4. Do not stop taking medication unless you consult with your physician first.           YELLOW         ZONE Getting Worse    What it looks like:     Depression is starting to interfere with your life.     It may be hard to get out of bed; you may be starting to isolate yourself from others.    Symptoms of depression are starting to last most all day and this has happened for several days.     You may have suicidal thoughts but they are not constant.   What you need to do:     1. Call your care team, your response to treatment will improve if you keep your care team informed of your progress. Yellow periods are signs an adjustment may need to be made.     2. Continue your self-care, even if you have to fake it!    3. Talk to someone in your support network    4. Open up your depression survival kit           RED    ZONE Medical Alert - Get Help    What it looks like:     Depression is seriously interfering with your life.     You may experience these or other symptoms: You can t get out of bed most days, can t work or engage in other necessary activities, you have trouble taking care of basic hygiene, or basic responsibilities, thoughts of suicide or death that will not go away, self-injurious behavior.     What you need to do:  1. Call  your care team and request a same-day appointment. If they are not available (weekends or after hours) call your local crisis line, emergency room or 911.      Electronically signed by: Anand Mcclendon, October 23, 2017    Depression Self Care Plan / Survival Kit    Self-Care for Depression  Here s the deal. Your body and mind are really not as separate as most people think.  What you do and think affects how you feel and how you feel influences what you do and think. This means if you do things that people who feel good do, it will help you feel better.  Sometimes this is all it takes.  There is also a place for medication and therapy depending on how severe your depression is, so be sure to consult with your medical provider and/ or Behavioral Health Consultant if your symptoms are worsening or not improving.     In order to better manage my stress, I will:    Exercise  Get some form of exercise, every day. This will help reduce pain and release endorphins, the  feel good  chemicals in your brain. This is almost as good as taking antidepressants!  This is not the same as joining a gym and then never going! (they count on that by the way ) It can be as simple as just going for a walk or doing some gardening, anything that will get you moving.      Hygiene   Maintain good hygiene (Get out of bed in the morning, Make your bed, Brush your teeth, Take a shower, and Get dressed like you were going to work, even if you are unemployed).  If your clothes don't fit try to get ones that do.    Diet  I will strive to eat foods that are good for me, drink plenty of water, and avoid excessive sugar, caffeine, alcohol, and other mood-altering substances.  Some foods that are helpful in depression are: complex carbohydrates, B vitamins, flaxseed, fish or fish oil, fresh fruits and vegetables.    Psychotherapy  I agree to participate in Individual Therapy (if recommended).    Medication  If prescribed medications, I agree to  take them.  Missing doses can result in serious side effects.  I understand that drinking alcohol, or other illicit drug use, may cause potential side effects.  I will not stop my medication abruptly without first discussing it with my provider.    Staying Connected With Others  I will stay in touch with my friends, family members, and my primary care provider/team.    Use your imagination  Be creative.  We all have a creative side; it doesn t matter if it s oil painting, sand castles, or mud pies! This will also kick up the endorphins.    Witness Beauty  (AKA stop and smell the roses) Take a look outside, even in mid-winter. Notice colors, textures. Watch the squirrels and birds.     Service to others  Be of service to others.  There is always someone else in need.  By helping others we can  get out of ourselves  and remember the really important things.  This also provides opportunities for practicing all the other parts of the program.    Humor  Laugh and be silly!  Adjust your TV habits for less news and crime-drama and more comedy.    Control your stress  Try breathing deep, massage therapy, biofeedback, and meditation. Find time to relax each day.     My support system    Clinic Contact:  Phone number:    Contact 1:  Phone number:    Contact 2:  Phone number:    Presybeterian/:  Phone number:    Therapist:  Phone number:    Local crisis center:    Phone number:    Other community support:  Phone number:

## 2017-10-23 NOTE — NURSING NOTE
"Chief Complaint   Patient presents with     Follow Up For     Nausea       Initial /77 (BP Location: Right arm, Patient Position: Chair, Cuff Size: Adult Regular)  Pulse 78  Temp 98.1  F (36.7  C) (Tympanic)  Resp 14  Ht 6' 1\" (1.854 m)  Wt 200 lb 6.4 oz (90.9 kg)  SpO2 94%  BMI 26.44 kg/m2 Estimated body mass index is 26.44 kg/(m^2) as calculated from the following:    Height as of this encounter: 6' 1\" (1.854 m).    Weight as of this encounter: 200 lb 6.4 oz (90.9 kg).  Medication Reconciliation: complete    Eva Suarez MA  "

## 2017-10-23 NOTE — PROGRESS NOTES
SUBJECTIVE:   Rosendo Dallas is a 36 year old male who presents to clinic today for the following health issues:      Acute Illness   Acute illness concerns: Follow up from 10/13/17  Onset:     Fever: no     Chills/Sweats: YES- chills    Headache (location?): YES- off and on    Sinus Pressure:no    Conjunctivitis:  no    Ear Pain: no    Rhinorrhea: no     Congestion: no     Sore Throat: no      Cough: no    Wheeze: no     Decreased Appetite: YES    Nausea: YES    Vomiting: no     Diarrhea:  YES- improving    Dysuria/Freq.: no     Fatigue/Achiness: YES    Sick/Strep Exposure: YES     Therapies Tried and outcome:     The pt was seen in clinic 10 days ago for diarrhea, nausea, and myalgias. At this time symptoms appeared viral in nature so recommended bland diet, fluids, and rest. Since then the diarrhea has improved, but the nausea has persisted. The pt becomes nauseated intermittently throughout the day with associated chills and sweats. He has some crampy abdominal pain and radiates towards his chest as well. He denies vomiting, fever, shortness of breath, or exposure to sick contacts. The pt has been taking Prilosec with no improvement.     Problem list and histories reviewed & adjusted, as indicated.  Additional history: as documented    Patient Active Problem List   Diagnosis     Vitamin D deficiency     Muscle pain     Anxiety     Major depressive disorder, single episode, moderate (H)     No past surgical history on file.    Social History   Substance Use Topics     Smoking status: Never Smoker     Smokeless tobacco: Never Used     Alcohol use Yes      Comment: occassional     Family History   Problem Relation Age of Onset     Hypertension Mother      Hyperlipidemia Mother      Depression Other      dad's side     DIABETES No family hx of      Coronary Artery Disease No family hx of      CEREBROVASCULAR DISEASE No family hx of      Breast Cancer No family hx of      Colon Cancer No family hx of      Prostate  "Cancer No family hx of      Thyroid Disease No family hx of          Current Outpatient Prescriptions   Medication Sig Dispense Refill     escitalopram (LEXAPRO) 10 MG tablet Take 1 tablet (10 mg) by mouth daily 90 tablet 1     dicyclomine (BENTYL) 20 MG tablet Take 1 tablet (20 mg) by mouth 4 times daily as needed 40 tablet 0     omeprazole (PRILOSEC) 10 MG capsule Take by mouth 30-60 minutes before a meal. 60 capsule      [DISCONTINUED] escitalopram (LEXAPRO) 10 MG tablet Take 1 tablet (10 mg) by mouth daily 90 tablet 1     No Known Allergies      Reviewed and updated as needed this visit by clinical staff  Tobacco  Allergies  Meds       Reviewed and updated as needed this visit by Provider         ROS:  Constitutional, HEENT, cardiovascular, pulmonary, GI, , musculoskeletal, neuro, skin, endocrine and psych systems are negative, except as otherwise noted.      OBJECTIVE:   /77 (BP Location: Right arm, Patient Position: Chair, Cuff Size: Adult Regular)  Pulse 78  Temp 98.1  F (36.7  C) (Tympanic)  Resp 14  Ht 6' 1\" (1.854 m)  Wt 200 lb 6.4 oz (90.9 kg)  SpO2 94%  BMI 26.44 kg/m2  Body mass index is 26.44 kg/(m^2).  GENERAL: healthy, alert and no distress  NECK: no adenopathy, no asymmetry, masses, or scars and thyroid normal to palpation  RESP: lungs clear to auscultation - no rales, rhonchi or wheezes  CV: regular rate and rhythm, normal S1 S2, no S3 or S4, no murmur, click or rub  ABDOMEN: soft, mild tenderness to palpation of LUQ, no hepatosplenomegaly, no masses, hyperactive bowel sounds  MS: no gross musculoskeletal defects noted, no edema  PSYCH: mentation appears normal, affect normal/bright  LYMPH: no cervical, supraclavicular, axillary, or inguinal adenopathy    Diagnostic Test Results:  Results for orders placed or performed in visit on 10/23/17 (from the past 24 hour(s))   CBC with platelets differential   Result Value Ref Range    WBC 6.4 4.0 - 11.0 10e9/L    RBC Count 5.19 4.4 - 5.9 " 10e12/L    Hemoglobin 16.2 13.3 - 17.7 g/dL    Hematocrit 45.7 40.0 - 53.0 %    MCV 88 78 - 100 fl    MCH 31.2 26.5 - 33.0 pg    MCHC 35.4 31.5 - 36.5 g/dL    RDW 12.4 10.0 - 15.0 %    Platelet Count 263 150 - 450 10e9/L    Diff Method Automated Method     % Neutrophils 73.4 %    % Lymphocytes 20.3 %    % Monocytes 4.7 %    % Eosinophils 1.3 %    % Basophils 0.3 %    Absolute Neutrophil 4.7 1.6 - 8.3 10e9/L    Absolute Lymphocytes 1.3 0.8 - 5.3 10e9/L    Absolute Monocytes 0.3 0.0 - 1.3 10e9/L    Absolute Eosinophils 0.1 0.0 - 0.7 10e9/L    Absolute Basophils 0.0 0.0 - 0.2 10e9/L     EKG: Normal Sinus Rhythm    ASSESSMENT/PLAN:     1. Nausea  Given symptoms have been present for over 2 weeks want to further assess with following labs. EKG normal, do not suspect cardiac etiology. Pt has no improvement with Prilosec to make reflux less likely as well. Advise he may try bentyl to help with nausea and bloating, will follow up with him when results available  - CBC with platelets differential  - Comprehensive metabolic panel (BMP + Alb, Alk Phos, ALT, AST, Total. Bili, TP)  - Lipase  - H Pylori antigen, stool; Future  - TSH with free T4 reflex  - EKG 12 lead-complete w/read - Normal sinus rhythm    For acute symptoms will try dicyclomine (BENTYL) 20 MG tablet; Take 1 tablet (20 mg) by mouth 4 times daily as needed  Dispense: 40 tablet; Refill: 0    2. Anxiety  Due for fill, controlled  - escitalopram (LEXAPRO) 10 MG tablet; Take 1 tablet (10 mg) by mouth daily  Dispense: 90 tablet; Refill: 1    3. Moderate single current episode of major depressive disorder (H)  - escitalopram (LEXAPRO) 10 MG tablet; Take 1 tablet (10 mg) by mouth daily  Dispense: 90 tablet; Refill: 1          See Patient Instructions    Anand Mcclendon PA-C  [facility]

## 2017-10-23 NOTE — MR AVS SNAPSHOT
After Visit Summary   10/23/2017    Rosendo Dallas    MRN: [id number]           Patient Information     Date Of Birth          1980        Visit Information        Provider Department      10/23/2017 10:00 AM Anand Mcclendon PA-C [facility] [place] [facility]        Today's Diagnoses     Need for prophylactic vaccination and inoculation against influenza    -  1    Nausea        Anxiety        Moderate single current episode of major depressive disorder (H)           Follow-ups after your visit        Who to contact     If you have questions or need follow up information about today's clinic visit or your schedule please contact [facility] directly at [phone].  Normal or non-critical lab and imaging results will be communicated to you by Kukupiahart, letter or phone within 4 business days after the clinic has received the results. If you do not hear from us within 7 days, please contact the clinic through Kukupiahart or phone. If you have a critical or abnormal lab result, we will notify you by phone as soon as possible.  Submit refill requests through [employer] or call your pharmacy and they will forward the refill request to us. Please allow 3 business days for your refill to be completed.          Additional Information About Your Visit        MyChart Information     [employer] gives you secure access to your electronic health record. If you see a primary care provider, you can also send messages to your care team and make appointments. If you have questions, please call your primary care clinic.  If you do not have a primary care provider, please call [phone] and they will assist you.        Care EveryWhere ID     This is your Care EveryWhere ID. This could be used by other organizations to access your [place] medical records  [id number]        Your Vitals Were     Pulse Temperature Respirations Height Pulse Oximetry BMI (Body Mass Index)    78 98.1  F (36.7  C)  "(Tympanic) 14 6' 1\" (1.854 m) 94% 26.44 kg/m2       Blood Pressure from Last 3 Encounters:   10/23/17 125/77   10/13/17 110/80   07/19/17 104/64    Weight from Last 3 Encounters:   10/23/17 200 lb 6.4 oz (90.9 kg)   10/13/17 203 lb (92.1 kg)   07/19/17 201 lb 11.2 oz (91.5 kg)              We Performed the Following     CBC with platelets differential     Comprehensive metabolic panel (BMP + Alb, Alk Phos, ALT, AST, Total. Bili, TP)     EKG 12-lead complete w/read - Clinics     H Pylori antigen, stool     Lipase     TSH with free T4 reflex          Today's Medication Changes          These changes are accurate as of: 10/23/17 12:52 PM.  If you have any questions, ask your nurse or doctor.               Start taking these medicines.        Dose/Directions    dicyclomine 20 MG tablet   Commonly known as:  BENTYL   Used for:  Nausea   Started by:  Anand cMclendon PA-C        Dose:  20 mg   Take 1 tablet (20 mg) by mouth 4 times daily as needed   Quantity:  40 tablet   Refills:  0            Where to get your medicines      These medications were sent to [facility] [address] [place] [address] AT [facility] OF [facility] [address] [address]     Phone:  [phone]     dicyclomine 20 MG tablet    escitalopram 10 MG tablet                Primary Care Provider Office Phone # Fax #    [facility] [phone] [address] [facility] [address]        Equal Access to Services     EMILY DELVALLE AH: Asha Sky, julio albert, joyce valenzuela. So [facility] [phone].    ATENCIÓN: Si habla español, tiene a crump disposición servicios gratuitos de asistencia lingüística. Llame al [phone].    We comply with applicable federal civil rights laws and [place] laws. We do not discriminate on the basis of race, color, national origin, age, disability, sex, sexual " orientation, or gender identity.            Thank you!     Thank you for choosing [facility]  for your care. Our goal is always to provide you with excellent care. Hearing back from our patients is one way we can continue to improve our services. Please take a few minutes to complete the written survey that you may receive in the mail after your visit with us. Thank you!             Your Updated Medication List - Protect others around you: Learn how to safely use, store and throw away your medicines at www.disposemymeds.org.          This list is accurate as of: 10/23/17 12:52 PM.  Always use your most recent med list.                   Brand Name Dispense Instructions for use Diagnosis    dicyclomine 20 MG tablet    BENTYL    40 tablet    Take 1 tablet (20 mg) by mouth 4 times daily as needed    Nausea       escitalopram 10 MG tablet    LEXAPRO    90 tablet    Take 1 tablet (10 mg) by mouth daily    Anxiety, Moderate single current episode of major depressive disorder (H)       omeprazole 10 MG CR capsule    priLOSEC    60 capsule    Take by mouth 30-60 minutes before a meal.

## 2017-10-24 ENCOUNTER — TELEPHONE (OUTPATIENT)
Dept: FAMILY MEDICINE | Facility: CLINIC | Age: 37
End: 2017-10-24

## 2017-10-24 DIAGNOSIS — R11.0 NAUSEA: Primary | ICD-10-CM

## 2017-10-24 LAB
ALBUMIN SERPL-MCNC: 4.3 G/DL (ref 3.4–5)
ALP SERPL-CCNC: 74 U/L (ref 40–150)
ALT SERPL W P-5'-P-CCNC: 33 U/L (ref 0–70)
ANION GAP SERPL CALCULATED.3IONS-SCNC: 7 MMOL/L (ref 3–14)
AST SERPL W P-5'-P-CCNC: 21 U/L (ref 0–45)
BILIRUB SERPL-MCNC: 1.7 MG/DL (ref 0.2–1.3)
BUN SERPL-MCNC: 11 MG/DL (ref 7–30)
CALCIUM SERPL-MCNC: 9 MG/DL (ref 8.5–10.1)
CHLORIDE SERPL-SCNC: 101 MMOL/L (ref 94–109)
CO2 SERPL-SCNC: 28 MMOL/L (ref 20–32)
CREAT SERPL-MCNC: 0.87 MG/DL (ref 0.66–1.25)
GFR SERPL CREATININE-BSD FRML MDRD: >90 ML/MIN/1.7M2
GLUCOSE SERPL-MCNC: 98 MG/DL (ref 70–99)
H PYLORI AG STL QL IA: NORMAL
POTASSIUM SERPL-SCNC: 4.2 MMOL/L (ref 3.4–5.3)
PROT SERPL-MCNC: 7.6 G/DL (ref 6.8–8.8)
SODIUM SERPL-SCNC: 136 MMOL/L (ref 133–144)
SPECIMEN SOURCE: NORMAL
TSH SERPL DL<=0.005 MIU/L-ACNC: 0.57 MU/L (ref 0.4–4)

## 2017-10-24 RX ORDER — ONDANSETRON 4 MG/1
4 TABLET, FILM COATED ORAL EVERY 8 HOURS PRN
Qty: 18 TABLET | Refills: 0 | Status: SHIPPED | OUTPATIENT
Start: 2017-10-24 | End: 2018-01-31

## 2017-10-24 NOTE — TELEPHONE ENCOUNTER
Patient notified with information noted below from provider and agrees with plan.  Hedy Landry RN - Triage  [facility]

## 2017-10-24 NOTE — TELEPHONE ENCOUNTER
Please call brandon with the results of his labs.  Overall his labs are normal.  They do a show a slightly elevated bilirubin which is baseline from his previous labs.   His labs do not show any reason for his continued nausea.  At this time I would advise that he try a trial of Prilosec 20 mg daily and I can prescribe him zofran to be used PRN for nausea.  I would like him to schedule an appointment with GI for 2-4 weeks from now.  If symptoms improve he may cancel this, but he should schedule for further evaluation just in case his symptoms continue to be persistent

## 2017-10-27 ENCOUNTER — TRANSFERRED RECORDS (OUTPATIENT)
Dept: HEALTH INFORMATION MANAGEMENT | Facility: CLINIC | Age: 37
End: 2017-10-27

## 2018-01-29 ENCOUNTER — TRANSFERRED RECORDS (OUTPATIENT)
Dept: HEALTH INFORMATION MANAGEMENT | Facility: CLINIC | Age: 38
End: 2018-01-29

## 2018-01-31 ENCOUNTER — OFFICE VISIT (OUTPATIENT)
Dept: FAMILY MEDICINE | Facility: CLINIC | Age: 38
End: 2018-01-31
Payer: COMMERCIAL

## 2018-01-31 VITALS
DIASTOLIC BLOOD PRESSURE: 82 MMHG | HEART RATE: 55 BPM | SYSTOLIC BLOOD PRESSURE: 116 MMHG | HEIGHT: 73 IN | WEIGHT: 213.5 LBS | RESPIRATION RATE: 16 BRPM | OXYGEN SATURATION: 98 % | TEMPERATURE: 97.2 F | BODY MASS INDEX: 28.3 KG/M2

## 2018-01-31 DIAGNOSIS — L98.9 SKIN LESION: Primary | ICD-10-CM

## 2018-01-31 PROCEDURE — 99213 OFFICE O/P EST LOW 20 MIN: CPT | Performed by: FAMILY MEDICINE

## 2018-01-31 NOTE — NURSING NOTE
"Chief Complaint   Patient presents with     Derm Problem     LT calf       Initial /82 (BP Location: Right arm, Patient Position: Sitting, Cuff Size: Adult Large)  Pulse 55  Temp 97.2  F (36.2  C) (Tympanic)  Resp 16  Ht 6' 1.25\" (1.861 m)  Wt 213 lb 8 oz (96.8 kg)  SpO2 98%  BMI 27.98 kg/m2 Estimated body mass index is 27.98 kg/(m^2) as calculated from the following:    Height as of this encounter: 6' 1.25\" (1.861 m).    Weight as of this encounter: 213 lb 8 oz (96.8 kg).  Medication Reconciliation: complete     Princess KHALIF Lamb CMA      "

## 2018-01-31 NOTE — PROGRESS NOTES
"  SUBJECTIVE:   Rosendo Dallas is a 37 year old male who presents to clinic today for the following health issues:      Derm Problem      Duration: noticed a couple weeks ago    Description  Location: LT calf  Itching: no    Intensity:  mild    Accompanying signs and symptoms: redness and raised    History (similar episodes/previous evaluation): None    Precipitating or alleviating factors:  New exposures:  None  Recent travel: no      Therapies tried and outcome: none    Not aware of any injury, doesn't think that he hit it  No pain, has not gotten any larger from when he noticed it        Problem list and histories reviewed & adjusted, as indicated.  Additional history: as documented    Labs reviewed in EPIC    Reviewed and updated as needed this visit by clinical staff  Tobacco  Allergies  Meds  Problems  Med Hx  Surg Hx  Fam Hx  Soc Hx        Reviewed and updated as needed this visit by Provider  Allergies  Meds  Problems         ROS:  CONSTITUTIONAL:NEGATIVE for fever, chills, change in weight  INTEGUMENTARY/SKIN: changing lesion Lt lower leg    OBJECTIVE:                                                    /82 (BP Location: Right arm, Patient Position: Sitting, Cuff Size: Adult Large)  Pulse 55  Temp 97.2  F (36.2  C) (Tympanic)  Resp 16  Ht 6' 1.25\" (1.861 m)  Wt 213 lb 8 oz (96.8 kg)  SpO2 98%  BMI 27.98 kg/m2  Body mass index is 27.98 kg/(m^2).  GENERAL APPEARANCE: healthy, alert and no distress  SKIN: no suspicious lesions or rashes and 3 mm raised lesion Lt lower leg. Red in color, does not eugene. No surrounding erythema     Diagnostic test results:  none      ASSESSMENT/PLAN:                                                        ICD-10-CM    1. Skin lesion L98.9        Follow up with Provider - as needed   Patient Instructions   No treatment needed.  Avoid rubbing or squeezing area      Brian Krause MD  [facility]    "

## 2018-01-31 NOTE — MR AVS SNAPSHOT
After Visit Summary   1/31/2018    Rosendo Dallas    MRN: [id number]           Patient Information     Date Of Birth          1980        Visit Information        Provider Department      1/31/2018 10:00 AM Brian Krause MD [facility]        Today's Diagnoses     Skin lesion    -  1      Care Instructions    No treatment needed.  Avoid rubbing or squeezing area          Follow-ups after your visit        Follow-up notes from your care team     Return if symptoms worsen or fail to improve.      Who to contact     If you have questions or need follow up information about today's clinic visit or your schedule please contact [facility] directly at [phone].  Normal or non-critical lab and imaging results will be communicated to you by MyChart, letter or phone within 4 business days after the clinic has received the results. If you do not hear from us within 7 days, please contact the clinic through Computimehart or phone. If you have a critical or abnormal lab result, we will notify you by phone as soon as possible.  Submit refill requests through Scienion or call your pharmacy and they will forward the refill request to us. Please allow 3 business days for your refill to be completed.          Additional Information About Your Visit        MyChart Information     [employer] gives you secure access to your electronic health record. If you see a primary care provider, you can also send messages to your care team and make appointments. If you have questions, please call your primary care clinic.  If you do not have a primary care provider, please call [phone] and they will assist you.        Care EveryWhere ID     This is your Care EveryWhere ID. This could be used by other organizations to access your [place] medical records  [id number]        Your Vitals Were     Pulse Temperature Respirations Height Pulse Oximetry BMI (Body  "Mass Index)    55 97.2  F (36.2  C) (Tympanic) 16 6' 1.25\" (1.861 m) 98% 27.98 kg/m2       Blood Pressure from Last 3 Encounters:   01/31/18 116/82   10/23/17 125/77   10/13/17 110/80    Weight from Last 3 Encounters:   01/31/18 213 lb 8 oz (96.8 kg)   10/23/17 200 lb 6.4 oz (90.9 kg)   10/13/17 203 lb (92.1 kg)              Today, you had the following     No orders found for display       Primary Care Provider Office Phone # Fax #    [facility] [phone] [address] [address]        Equal Access to Services     FILI DELVALLE : Hadii aad ku hadasho Soomaali, waaxda luqadaha, qaybta kaalmada adeegyada, joyce marquez hayjamar faust. So [facility] [phone].    ATENCIÓN: Si habla español, tiene a crump disposición servicios gratuitos de asistencia lingüística. Llame al [phone].    We comply with applicable federal civil rights laws and [place] laws. We do not discriminate on the basis of race, color, national origin, age, disability, sex, sexual orientation, or gender identity.            Thank you!     Thank you for choosing [facility]  for your care. Our goal is always to provide you with excellent care. Hearing back from our patients is one way we can continue to improve our services. Please take a few minutes to complete the written survey that you may receive in the mail after your visit with us. Thank you!             Your Updated Medication List - Protect others around you: Learn how to safely use, store and throw away your medicines at www.disposemymeds.org.          This list is accurate as of 1/31/18 10:12 AM.  Always use your most recent med list.                   Brand Name Dispense Instructions for use Diagnosis    escitalopram 10 MG tablet    LEXAPRO    90 tablet    Take 1 tablet (10 mg) by mouth daily    Anxiety, Moderate single current episode of major depressive disorder (H)       omeprazole 10 " MG CR capsule    priLOSEC    60 capsule    Take by mouth 30-60 minutes before a meal.

## 2018-02-07 ENCOUNTER — TRANSFERRED RECORDS (OUTPATIENT)
Dept: HEALTH INFORMATION MANAGEMENT | Facility: CLINIC | Age: 38
End: 2018-02-07

## 2018-02-12 ENCOUNTER — TRANSFERRED RECORDS (OUTPATIENT)
Dept: HEALTH INFORMATION MANAGEMENT | Facility: CLINIC | Age: 38
End: 2018-02-12

## 2018-02-23 ENCOUNTER — TRANSFERRED RECORDS (OUTPATIENT)
Dept: HEALTH INFORMATION MANAGEMENT | Facility: CLINIC | Age: 38
End: 2018-02-23

## 2018-03-12 ENCOUNTER — OFFICE VISIT (OUTPATIENT)
Dept: FAMILY MEDICINE | Facility: CLINIC | Age: 38
End: 2018-03-12
Payer: COMMERCIAL

## 2018-03-12 VITALS
SYSTOLIC BLOOD PRESSURE: 136 MMHG | HEIGHT: 73 IN | BODY MASS INDEX: 26.93 KG/M2 | DIASTOLIC BLOOD PRESSURE: 84 MMHG | TEMPERATURE: 98.3 F | OXYGEN SATURATION: 98 % | RESPIRATION RATE: 20 BRPM | WEIGHT: 203.2 LBS | HEART RATE: 65 BPM

## 2018-03-12 DIAGNOSIS — Z01.818 PREOP GENERAL PHYSICAL EXAM: Primary | ICD-10-CM

## 2018-03-12 PROCEDURE — 99214 OFFICE O/P EST MOD 30 MIN: CPT | Performed by: FAMILY MEDICINE

## 2018-03-12 ASSESSMENT — PAIN SCALES - GENERAL: PAINLEVEL: NO PAIN (0)

## 2018-03-12 NOTE — MR AVS SNAPSHOT
After Visit Summary   3/12/2018    Rosendo Dallas    MRN: [id number]           Patient Information     Date Of Birth          1980        Visit Information        Provider Department      3/12/2018 9:40 AM Carole Mckeon MD [facility]        Today's Diagnoses     Preop general physical exam    -  1      Care Instructions      Before Your Surgery      Call your surgeon if there is any change in your health. This includes signs of a cold or flu (such as a sore throat, runny nose, cough, rash or fever).    Do not smoke, drink alcohol or take over the counter medicine (unless your surgeon or primary care doctor tells you to) for the 24 hours before and after surgery.    If you take prescribed drugs: Follow your doctor s orders about which medicines to take and which to stop until after surgery.    Eating and drinking prior to surgery: follow the instructions from your surgeon    Take a shower or bath the night before surgery. Use the soap your surgeon gave you to gently clean your skin. If you do not have soap from your surgeon, use your regular soap. Do not shave or scrub the surgery site.  Wear clean pajamas and have clean sheets on your bed.           Follow-ups after your visit        Who to contact     If you have questions or need follow up information about today's clinic visit or your schedule please contact [facility] directly at [phone].  Normal or non-critical lab and imaging results will be communicated to you by MyChart, letter or phone within 4 business days after the clinic has received the results. If you do not hear from us within 7 days, please contact the clinic through MyChart or phone. If you have a critical or abnormal lab result, we will notify you by phone as soon as possible.  Submit refill requests through Knimb[employer] or call your pharmacy and they will forward the refill request to us. Please allow 3 business days for your refill to  "be completed.          Additional Information About Your Visit        LETSGROOPhart Information     [employer] gives you secure access to your electronic health record. If you see a primary care provider, you can also send messages to your care team and make appointments. If you have questions, please call your primary care clinic.  If you do not have a primary care provider, please call [phone] and they will assist you.        Care EveryWhere ID     This is your Care EveryWhere ID. This could be used by other organizations to access your [facility] medical records  [id number]        Your Vitals Were     Pulse Temperature Respirations Height Pulse Oximetry BMI (Body Mass Index)    65 98.3  F (36.8  C) (Oral) 20 1.861 m (6' 1.25\") 98% 26.63 kg/m2       Blood Pressure from Last 3 Encounters:   03/12/18 136/84   01/31/18 116/82   10/23/17 125/77    Weight from Last 3 Encounters:   03/12/18 92.2 kg (203 lb 3.2 oz)   01/31/18 96.8 kg (213 lb 8 oz)   10/23/17 90.9 kg (200 lb 6.4 oz)              Today, you had the following     No orders found for display       Primary Care Provider Office Phone # Fax #    [facility] [phone] [address] [facility] [address]        Equal Access to Services     FILI DELVALLE : Hadii aad ku hadasho Soomaali, waaxda luqadaha, qaybta kaalmada adeegyada, joyce faust. So [facility] [phone].    ATENCIÓN: Si habla español, tiene a crump disposición servicios gratuitos de asistencia lingüística. Llame al [phone].    We comply with applicable federal civil rights laws and [place] laws. We do not discriminate on the basis of race, color, national origin, age, disability, sex, sexual orientation, or gender identity.            Thank you!     Thank you for choosing [facility]  for your care. Our goal is always to provide you with excellent care. Hearing back from our patients is one way we can continue to " improve our services. Please take a few minutes to complete the written survey that you may receive in the mail after your visit with us. Thank you!             Your Updated Medication List - Protect others around you: Learn how to safely use, store and throw away your medicines at www.disposemymeds.org.          This list is accurate as of 3/12/18 10:06 AM.  Always use your most recent med list.                   Brand Name Dispense Instructions for use Diagnosis    escitalopram 10 MG tablet    LEXAPRO    90 tablet    Take 1 tablet (10 mg) by mouth daily    Anxiety, Moderate single current episode of major depressive disorder (H)       omeprazole 10 MG CR capsule    priLOSEC    60 capsule    Take by mouth 30-60 minutes before a meal.

## 2018-03-12 NOTE — NURSING NOTE
"Chief Complaint   Patient presents with     Pre-Op Exam       Initial /84 (BP Location: Right arm, Patient Position: Sitting, Cuff Size: Adult Regular)  Pulse 65  Temp 98.3  F (36.8  C) (Oral)  Resp 20  Ht 1.861 m (6' 1.25\")  Wt 92.2 kg (203 lb 3.2 oz)  SpO2 98%  BMI 26.63 kg/m2 Estimated body mass index is 26.63 kg/(m^2) as calculated from the following:    Height as of this encounter: 1.861 m (6' 1.25\").    Weight as of this encounter: 92.2 kg (203 lb 3.2 oz).  Medication Reconciliation: bart Barnes        "

## 2018-03-12 NOTE — PROGRESS NOTES
[facility]  [address] [address]  [phone]  Dept: [phone]    PRE-OP EVALUATION:  Today's date: 3/12/2018    Rosendo Dallas (: 1980) presents for pre-operative evaluation assessment as requested by Dr. Valerie ireland.  He requires evaluation and anesthesia risk assessment prior to undergoing surgery/procedure for treatment of right lateral meniscus tear.    Proposed Surgery/ Procedure: Partial Menisectomy-Arthroscopic  Date of Surgery/ Procedure: 18  Time of Surgery/ Procedure: 12:00pm  Hospital/Surgical Facility: [facility]  [phone]  Primary Physician: [facility]  Type of Anesthesia Anticipated: General    Patient has a Health Care Directive or Living Will:  YES     1. NO - Do you have a history of heart attack, stroke, stent, bypass or surgery on an artery in the head, neck, heart or legs?  2. NO - Do you ever have any pain or discomfort in your chest?  3. NO - Do you have a history of  Heart Failure?  4. NO - Are you troubled by shortness of breath when: walking on the level, up a slight hill or at night?  5. YES - Do you currently have a cold, bronchitis or other respiratory infection?  6. NO - Do you have a cough, shortness of breath or wheezing?  7. NO - Do you sometimes get pains in the calves of your legs when you walk?  8. NO - Do you or anyone in your family have previous history of blood clots?  9. NO - Do you or does anyone in your family have a serious bleeding problem such as prolonged bleeding following surgeries or cuts?  10. NO - Have you ever had problems with anemia or been told to take iron pills?  11. NO - Have you had any abnormal blood loss such as black, tarry or bloody stools, or abnormal vaginal bleeding?  12. NO - Have you ever had a blood transfusion?  13. NO - Have you or any of your relatives ever had problems with anesthesia?  14. NO - Do you have sleep apnea, excessive snoring or  daytime drowsiness?  15. NO - Do you have any prosthetic heart valves?  16. NO - Do you have prosthetic joints?  17. NO - Is there any chance that you may be pregnant?      HPI:     HPI related to upcoming procedure:   Developed pain and swelling in the right knee that did not respond to conservative care.  Found to have a complex tear of the anterior horn of the right lateral meniscus.  Planned arthroscopic debridement.    See problem list for active medical problems.  Problems all longstanding and stable, except as noted/documented.  See ROS for pertinent symptoms related to these conditions.                                                                                                  .    MEDICAL HISTORY:     Patient Active Problem List    Diagnosis Date Noted     Anxiety 03/12/2017     Priority: Medium     Major depressive disorder, single episode, moderate (H) 03/12/2017     Priority: Medium     Vitamin D deficiency 02/17/2017     Priority: Medium     Muscle pain 02/17/2017     Priority: Medium      Past Medical History:   Diagnosis Date     IBS (irritable colon syndrome)      History reviewed. No pertinent surgical history.  Current Outpatient Prescriptions   Medication Sig Dispense Refill     escitalopram (LEXAPRO) 10 MG tablet Take 1 tablet (10 mg) by mouth daily 90 tablet 1     omeprazole (PRILOSEC) 10 MG capsule Take by mouth 30-60 minutes before a meal. 60 capsule      OTC products: None, except as noted above    No Known Allergies   Latex Allergy: NO    Social History   Substance Use Topics     Smoking status: Never Smoker     Smokeless tobacco: Never Used     Alcohol use Yes      Comment: occassional     History   Drug Use No       REVIEW OF SYSTEMS:   CONSTITUTIONAL: NEGATIVE for fever, chills, change in weight  INTEGUMENTARY/SKIN: NEGATIVE for worrisome rashes, moles or lesions  EYES: NEGATIVE for vision changes or irritation  ENT/MOUTH: NEGATIVE for ear, mouth and throat problems  RESP: NEGATIVE  "for significant cough or SOB  BREAST: NEGATIVE for masses, tenderness or discharge  CV: NEGATIVE for chest pain, palpitations or peripheral edema  GI: NEGATIVE for nausea, abdominal pain, heartburn, or change in bowel habits  : NEGATIVE for frequency, dysuria, or hematuria  MUSCULOSKELETAL: As above  NEURO: NEGATIVE for weakness, dizziness or paresthesias  ENDOCRINE: NEGATIVE for temperature intolerance, skin/hair changes  HEME: NEGATIVE for bleeding problems  PSYCHIATRIC: NEGATIVE for changes in mood or affect    EXAM:   /84 (BP Location: Right arm, Patient Position: Sitting, Cuff Size: Adult Regular)  Pulse 65  Temp 98.3  F (36.8  C) (Oral)  Resp 20  Ht 1.861 m (6' 1.25\")  Wt 92.2 kg (203 lb 3.2 oz)  SpO2 98%  BMI 26.63 kg/m2    GENERAL APPEARANCE: healthy, alert and no distress     EYES: EOMI,  PERRL     HENT: ear canals and TM's normal and nose and mouth without ulcers or lesions     NECK: no adenopathy, no asymmetry, masses, or scars and thyroid normal to palpation     RESP: lungs clear to auscultation - no rales, rhonchi or wheezes     CV: regular rates and rhythm, normal S1 S2, no S3 or S4 and no murmur, click or rub     ABDOMEN:  soft, nontender, no HSM or masses and bowel sounds normal     MS: extremities normal- no gross deformities noted, no evidence of inflammation in joints, FROM in all extremities.     SKIN: no suspicious lesions or rashes     NEURO: Normal strength and tone, sensory exam grossly normal, mentation intact and speech normal     PSYCH: mentation appears normal. and affect normal/bright     LYMPHATICS: No cervical adenopathy    DIAGNOSTICS:   No labs or EKG required    Recent Labs   Lab Test  10/23/17   1054  06/01/17   1143   HGB  16.2  16.4   PLT  263  226   NA  136  137   POTASSIUM  4.2  4.3   CR  0.87  0.90        IMPRESSION:   Reason for surgery/procedure: Lateral meniscus tear of right knee  Diagnosis/reason for consult: Preoperative clearance    The proposed surgical " procedure is considered INTERMEDIATE risk.    REVISED CARDIAC RISK INDEX  The patient has the following serious cardiovascular risks for perioperative complications such as (MI, PE, VFib and 3  AV Block):  No serious cardiac risks  INTERPRETATION: 0 risks: Class I (very low risk - 0.4% complication rate)    The patient has the following additional risks for perioperative complications:  No identified additional risks      ICD-10-CM    1. Preop general physical exam Z01.818        RECOMMENDATIONS:         --Patient is to take all scheduled medications on the day of surgery.    APPROVAL GIVEN to proceed with proposed procedure, without further diagnostic evaluation       Signed Electronically by: Carole Mckeon MD    Copy of this evaluation report is provided to requesting physician.    [facility] Preop Guidelines

## 2018-04-08 ENCOUNTER — OFFICE VISIT (OUTPATIENT)
Dept: URGENT CARE | Facility: URGENT CARE | Age: 38
End: 2018-04-08
Payer: COMMERCIAL

## 2018-04-08 VITALS
SYSTOLIC BLOOD PRESSURE: 122 MMHG | HEART RATE: 72 BPM | OXYGEN SATURATION: 97 % | WEIGHT: 208.38 LBS | TEMPERATURE: 98.1 F | DIASTOLIC BLOOD PRESSURE: 78 MMHG | BODY MASS INDEX: 27.3 KG/M2

## 2018-04-08 DIAGNOSIS — J01.90 ACUTE SINUSITIS, RECURRENCE NOT SPECIFIED, UNSPECIFIED LOCATION: Primary | ICD-10-CM

## 2018-04-08 DIAGNOSIS — R05.8 PRODUCTIVE COUGH: ICD-10-CM

## 2018-04-08 PROCEDURE — 99214 OFFICE O/P EST MOD 30 MIN: CPT | Performed by: PHYSICIAN ASSISTANT

## 2018-04-08 NOTE — PROGRESS NOTES
SUBJECTIVE:   Rosendo Dallas is a 37 year old male presenting with a chief complaint of coughing, chest congestion, nasal drainage.  Onset of symptoms was 6 day(s) ago.  Course of illness is same.    Severity moderate  Current and Associated symptoms: facial pain/pressure  Treatment measures tried include Tylenol/Ibuprofen.  Predisposing factors include recent illness.    Past Medical History:   Diagnosis Date     IBS (irritable colon syndrome)      ALLERGIES   No Known Allergies      Social History   Substance Use Topics     Smoking status: Never Smoker     Smokeless tobacco: Never Used     Alcohol use Yes      Comment: occassional       ROS:  CONSTITUTIONAL:NEGATIVE for fever, chills, change in weight  INTEGUMENTARY/SKIN: NEGATIVE for worrisome rashes, moles or lesions  ENT/MOUTH: POSITIVE for purulent nasal drainage, congestion  RESP:NEGATIVE for significant cough or SOB  CV: NEGATIVE for chest pain, palpitations or peripheral edema  GI: NEGATIVE for nausea, abdominal pain, heartburn, or change in bowel habits  MUSCULOSKELETAL: NEGATIVE for significant arthralgias or myalgia  NEURO: NEGATIVE for weakness, dizziness or paresthesias    OBJECTIVE  :/78  Pulse 72  Temp 98.1  F (36.7  C) (Oral)  Wt 208 lb 6 oz (94.5 kg)  SpO2 97%  BMI 27.3 kg/m2  GENERAL APPEARANCE: healthy, alert and no distress  HENT: TM's normal bilaterally, nasal turbinates erythematous, swollen, frontal sinus tenderness  and maxillary sinus tenderness   NECK: supple, nontender, no lymphadenopathy  RESP: lungs clear to auscultation - no rales, rhonchi or wheezes  CV: regular rates and rhythm, normal S1 S2, no murmur noted  NEURO: Normal strength and tone, sensory exam grossly normal,  normal speech and mentation  SKIN: no suspicious lesions or rashes    ASSESSMENT/PLAN:    ICD-10-CM    1. Acute sinusitis, recurrence not specified, unspecified location J01.90 amoxicillin-clavulanate (AUGMENTIN) 875-125 MG per tablet   2. Productive cough  R05 amoxicillin-clavulanate (AUGMENTIN) 875-125 MG per tablet       Fluids, rest  Follow up with PCP as needed  See orders in Epic

## 2018-04-08 NOTE — MR AVS SNAPSHOT
After Visit Summary   4/8/2018    Rosendo Dallas    MRN: [id number]           Patient Information     Date Of Birth          1980        Visit Information        Provider Department      4/8/2018 11:50 AM Rosendo Bowers PA-C [facility]        Today's Diagnoses     Acute sinusitis, recurrence not specified, unspecified location    -  1    Productive cough           Follow-ups after your visit        Who to contact     If you have questions or need follow up information about today's clinic visit or your schedule please contact [facility] directly at [phone].  Normal or non-critical lab and imaging results will be communicated to you by Datical[employer], letter or phone within 4 business days after the clinic has received the results. If you do not hear from us within 7 days, please contact the clinic through Datical[employer] or phone. If you have a critical or abnormal lab result, we will notify you by phone as soon as possible.  Submit refill requests through [employer] or call your pharmacy and they will forward the refill request to us. Please allow 3 business days for your refill to be completed.          Additional Information About Your Visit        MyChart Information     [employer] gives you secure access to your electronic health record. If you see a primary care provider, you can also send messages to your care team and make appointments. If you have questions, please call your primary care clinic.  If you do not have a primary care provider, please call [phone] and they will assist you.        Care EveryWhere ID     This is your Care EveryWhere ID. This could be used by other organizations to access your [place] medical records  [id number]        Your Vitals Were     Pulse Temperature Pulse Oximetry BMI (Body Mass Index)          72 98.1  F (36.7  C) (Oral) 97% 27.3 kg/m2         Blood Pressure from Last 3 Encounters:   04/08/18 122/78    03/12/18 136/84   01/31/18 116/82    Weight from Last 3 Encounters:   04/08/18 208 lb 6 oz (94.5 kg)   03/12/18 203 lb 3.2 oz (92.2 kg)   01/31/18 213 lb 8 oz (96.8 kg)              Today, you had the following     No orders found for display         Today's Medication Changes          These changes are accurate as of 4/8/18 12:53 PM.  If you have any questions, ask your nurse or doctor.               Start taking these medicines.        Dose/Directions    amoxicillin-clavulanate 875-125 MG per tablet   Commonly known as:  AUGMENTIN   Used for:  Acute sinusitis, recurrence not specified, unspecified location, Productive cough        Dose:  1 tablet   Take 1 tablet by mouth 2 times daily   Quantity:  20 tablet   Refills:  0            Where to get your medicines      These medications were sent to [facility] [address] [place] [address] [address] [facility] [address] [place] [employer]  [address] [facility] [address]     Phone:  [phone]     amoxicillin-clavulanate 875-125 MG per tablet                Primary Care Provider Office Phone # Fax #    [facility] [phone] [address] [facility] [address]        Equal Access to Services     EMILY DELVALLE AH: Hadii greg benton hadasho Soleda, waaxda luqadaha, qaybta kaalmada adeegyada, joyce marquez hayhonorion alfredo faust. So [facility] [phone].    ATENCIÓN: Si habla español, tiene a crump disposición servicios gratuitos de asistencia lingüística. Llame al [phone].    We comply with applicable federal civil rights laws and [place] laws. We do not discriminate on the basis of race, color, national origin, age, disability, sex, sexual orientation, or gender identity.            Thank you!     Thank you for choosing [facility]  for your care. Our goal is always to provide you with excellent care. Hearing back from our patients is one way we can continue to improve our  services. Please take a few minutes to complete the written survey that you may receive in the mail after your visit with us. Thank you!             Your Updated Medication List - Protect others around you: Learn how to safely use, store and throw away your medicines at www.disposemymeds.org.          This list is accurate as of 4/8/18 12:53 PM.  Always use your most recent med list.                   Brand Name Dispense Instructions for use Diagnosis    amoxicillin-clavulanate 875-125 MG per tablet    AUGMENTIN    20 tablet    Take 1 tablet by mouth 2 times daily    Acute sinusitis, recurrence not specified, unspecified location, Productive cough       escitalopram 10 MG tablet    LEXAPRO    90 tablet    Take 1 tablet (10 mg) by mouth daily    Anxiety, Moderate single current episode of major depressive disorder (H)       omeprazole 10 MG CR capsule    priLOSEC    60 capsule    Take by mouth 30-60 minutes before a meal.

## 2018-04-10 ENCOUNTER — RADIANT APPOINTMENT (OUTPATIENT)
Dept: GENERAL RADIOLOGY | Facility: CLINIC | Age: 38
End: 2018-04-10
Attending: FAMILY MEDICINE
Payer: COMMERCIAL

## 2018-04-10 ENCOUNTER — OFFICE VISIT (OUTPATIENT)
Dept: FAMILY MEDICINE | Facility: CLINIC | Age: 38
End: 2018-04-10
Payer: COMMERCIAL

## 2018-04-10 VITALS
HEART RATE: 104 BPM | BODY MASS INDEX: 27.96 KG/M2 | TEMPERATURE: 99 F | SYSTOLIC BLOOD PRESSURE: 130 MMHG | OXYGEN SATURATION: 95 % | DIASTOLIC BLOOD PRESSURE: 82 MMHG | WEIGHT: 211 LBS | HEIGHT: 73 IN

## 2018-04-10 DIAGNOSIS — R05.9 COUGH: ICD-10-CM

## 2018-04-10 DIAGNOSIS — J20.9 ACUTE BRONCHITIS, UNSPECIFIED ORGANISM: ICD-10-CM

## 2018-04-10 DIAGNOSIS — R09.82 POST-NASAL DRIP: ICD-10-CM

## 2018-04-10 DIAGNOSIS — R05.9 COUGH: Primary | ICD-10-CM

## 2018-04-10 PROCEDURE — 99214 OFFICE O/P EST MOD 30 MIN: CPT | Performed by: FAMILY MEDICINE

## 2018-04-10 PROCEDURE — 71046 X-RAY EXAM CHEST 2 VIEWS: CPT

## 2018-04-10 RX ORDER — FLUTICASONE PROPIONATE 50 MCG
2 SPRAY, SUSPENSION (ML) NASAL DAILY
Qty: 16 G | Refills: 0 | Status: SHIPPED | OUTPATIENT
Start: 2018-04-10 | End: 2018-07-16

## 2018-04-10 RX ORDER — ALBUTEROL SULFATE 90 UG/1
2 AEROSOL, METERED RESPIRATORY (INHALATION) EVERY 4 HOURS PRN
Qty: 1 INHALER | Refills: 0 | Status: SHIPPED | OUTPATIENT
Start: 2018-04-10 | End: 2018-07-16

## 2018-04-10 NOTE — MR AVS SNAPSHOT
"              After Visit Summary   4/10/2018    Rosendo Dallas    MRN: [id number]           Patient Information     Date Of Birth          1980        Visit Information        Provider Department      4/10/2018 1:20 PM Crista Garcia MD [facility]        Today's Diagnoses     Cough    -  1    Acute bronchitis, unspecified organism        Post-nasal drip           Follow-ups after your visit        Who to contact     If you have questions or need follow up information about today's clinic visit or your schedule please contact [facility] directly at [phone].  Normal or non-critical lab and imaging results will be communicated to you by MyChart, letter or phone within 4 business days after the clinic has received the results. If you do not hear from us within 7 days, please contact the clinic through [employer] or phone. If you have a critical or abnormal lab result, we will notify you by phone as soon as possible.  Submit refill requests through [employer] or call your pharmacy and they will forward the refill request to us. Please allow 3 business days for your refill to be completed.          Additional Information About Your Visit        MyChart Information     [employer] gives you secure access to your electronic health record. If you see a primary care provider, you can also send messages to your care team and make appointments. If you have questions, please call your primary care clinic.  If you do not have a primary care provider, please call [phone] and they will assist you.        Care EveryWhere ID     This is your Care EveryWhere ID. This could be used by other organizations to access your [place] medical records  [id number]        Your Vitals Were     Pulse Temperature Height Pulse Oximetry BMI (Body Mass Index)       104 99  F (37.2  C) (Oral) 6' 1\" (1.854 m) 95% 27.84 kg/m2        Blood Pressure from Last 3 Encounters:   04/10/18 130/82   04/08/18 122/78 "   03/12/18 136/84    Weight from Last 3 Encounters:   04/10/18 211 lb (95.7 kg)   04/08/18 208 lb 6 oz (94.5 kg)   03/12/18 203 lb 3.2 oz (92.2 kg)                 Today's Medication Changes          These changes are accurate as of 4/10/18  3:04 PM.  If you have any questions, ask your nurse or doctor.               Start taking these medicines.        Dose/Directions    albuterol 108 (90 Base) MCG/ACT Inhaler   Commonly known as:  PROAIR HFA/PROVENTIL HFA/VENTOLIN HFA   Used for:  Acute bronchitis, unspecified organism   Started by:  Crista Garcia MD        Dose:  2 puff   Inhale 2 puffs into the lungs every 4 hours as needed for shortness of breath / dyspnea or wheezing   Quantity:  1 Inhaler   Refills:  0       fluticasone 50 MCG/ACT spray   Commonly known as:  FLONASE   Used for:  Post-nasal drip   Started by:  Crista Garcia MD        Dose:  2 spray   Spray 2 sprays into both nostrils daily   Quantity:  16 g   Refills:  0            Where to get your medicines      [facility] [address] [place], [place] [address] [facility] [address] [place] [address] [facility] [address]     Phone:  [phone]     albuterol 108 (90 Base) MCG/ACT Inhaler    fluticasone 50 MCG/ACT spray                Primary Care Provider Office Phone # Fax #    [facility] [phone] [address] [facility] [address]        Equal Access to Services     EMILY DELVALLE : julio Cohen, joyce valenzuela. So [facility] [phone].    ATENCIÓN: Si habla español, tiene a crump disposición servicios gratuitos de asistencia lingüística. Zina al [phone].    We comply with applicable federal civil rights laws and [place] laws. We do not discriminate on the basis of race, color, national origin, age, disability, sex, sexual orientation, or gender  identity.            Thank you!     Thank you for choosing [facility]  for your care. Our goal is always to provide you with excellent care. Hearing back from our patients is one way we can continue to improve our services. Please take a few minutes to complete the written survey that you may receive in the mail after your visit with us. Thank you!             Your Updated Medication List - Protect others around you: Learn how to safely use, store and throw away your medicines at www.disposemymeds.org.          This list is accurate as of 4/10/18  3:04 PM.  Always use your most recent med list.                   Brand Name Dispense Instructions for use Diagnosis    albuterol 108 (90 Base) MCG/ACT Inhaler    PROAIR HFA/PROVENTIL HFA/VENTOLIN HFA    1 Inhaler    Inhale 2 puffs into the lungs every 4 hours as needed for shortness of breath / dyspnea or wheezing    Acute bronchitis, unspecified organism       amoxicillin-clavulanate 875-125 MG per tablet    AUGMENTIN    20 tablet    Take 1 tablet by mouth 2 times daily    Acute sinusitis, recurrence not specified, unspecified location, Productive cough       escitalopram 10 MG tablet    LEXAPRO    90 tablet    Take 1 tablet (10 mg) by mouth daily    Anxiety, Moderate single current episode of major depressive disorder (H)       fluticasone 50 MCG/ACT spray    FLONASE    16 g    Spray 2 sprays into both nostrils daily    Post-nasal drip       omeprazole 10 MG CR capsule    priLOSEC    60 capsule    Take by mouth 30-60 minutes before a meal.

## 2018-04-10 NOTE — PROGRESS NOTES
"  SUBJECTIVE:                                                    Rosendo Dallas is a 37 year old male who presents to clinic today for the following health issues:      Pt was seen at  on the weekend for bronchitis/sinusitis. He was given Augmentin for 10 days. Since being seen, he feels that his symptoms are about the same. He has coughing fits, productive cough (green), nasal discharge (green-clear), headaches, scratchy throat, chest tightness after coughing fit. No fever, chills, sinus pressure, sore throat, ear pain, shortness of breath or chest pain. He aha  Surgery on 04/12/18 for right meniscus tear. Pt would like an xray to r/u pneumonia. No smoking.         Problem list and histories reviewed & adjusted, as indicated.  Additional history: as documented    Labs reviewed in EPIC    ROS:  Constitutional, HEENT, cardiovascular, pulmonary, gi and gu systems are negative, except as otherwise noted.    OBJECTIVE:     /82  Pulse 104  Temp 99  F (37.2  C) (Oral)  Ht 6' 1\" (1.854 m)  Wt 211 lb (95.7 kg)  SpO2 95%  BMI 27.84 kg/m2  Body mass index is 27.84 kg/(m^2).  GENERAL: healthy, alert and no distress  EYES: Eyes grossly normal to inspection  HENT: ear canals and TM's normal, nose and mouth without ulcers or lesions, no sinus tenderness  NECK: no adenopathy  RESP: lungs clear to auscultation - no rales, rhonchi or wheezes  CV: regular rate and rhythm, normal S1 S2    Diagnostic Test Results:  CXR - unremarkable    ASSESSMENT/PLAN:     1. Cough  - XR Chest 2 Views; per my view showed no opacity, official read pending     2. Acute bronchitis, unspecified organism  - continue current abx dose   - albuterol (PROAIR HFA/PROVENTIL HFA/VENTOLIN HFA) 108 (90 Base) MCG/ACT Inhaler; Inhale 2 puffs into the lungs every 4 hours as needed for shortness of breath / dyspnea or wheezing  Dispense: 1 Inhaler; Refill: 0    3. Post-nasal drip  - fluticasone (FLONASE) 50 MCG/ACT spray; Spray 2 sprays into both nostrils " daily  Dispense: 16 g; Refill: 0    Deqa Chris Garcia MD  [facility]

## 2018-04-27 ENCOUNTER — TRANSFERRED RECORDS (OUTPATIENT)
Dept: HEALTH INFORMATION MANAGEMENT | Facility: CLINIC | Age: 38
End: 2018-04-27

## 2018-07-12 ENCOUNTER — OFFICE VISIT (OUTPATIENT)
Dept: FAMILY MEDICINE | Facility: CLINIC | Age: 38
End: 2018-07-12
Payer: COMMERCIAL

## 2018-07-12 ENCOUNTER — TELEPHONE (OUTPATIENT)
Dept: FAMILY MEDICINE | Facility: CLINIC | Age: 38
End: 2018-07-12

## 2018-07-12 ENCOUNTER — TRANSFERRED RECORDS (OUTPATIENT)
Dept: HEALTH INFORMATION MANAGEMENT | Facility: CLINIC | Age: 38
End: 2018-07-12

## 2018-07-12 VITALS
DIASTOLIC BLOOD PRESSURE: 82 MMHG | SYSTOLIC BLOOD PRESSURE: 116 MMHG | TEMPERATURE: 98 F | HEART RATE: 76 BPM | WEIGHT: 209.9 LBS | HEIGHT: 73 IN | OXYGEN SATURATION: 97 % | BODY MASS INDEX: 27.82 KG/M2

## 2018-07-12 DIAGNOSIS — R51.9 NONINTRACTABLE EPISODIC HEADACHE, UNSPECIFIED HEADACHE TYPE: Primary | ICD-10-CM

## 2018-07-12 PROCEDURE — 99214 OFFICE O/P EST MOD 30 MIN: CPT | Performed by: INTERNAL MEDICINE

## 2018-07-12 NOTE — MR AVS SNAPSHOT
After Visit Summary   7/12/2018    Rosendo Dallas    MRN: [id number]           Patient Information     Date Of Birth          1980        Visit Information        Provider Department      7/12/2018 9:40 AM Elena Apple MD [place] [facility]        Today's Diagnoses     Nonintractable episodic headache, unspecified headache type    -  1       Follow-ups after your visit        Additional Services     NEUROLOGY ADULT REFERRAL       Your provider has referred you for the following:   Consult at [facility]: [facility] [phone]   [email or web address]    Please be aware that coverage of these services is subject to the terms and limitations of your health insurance plan.  Call member services at your health plan with any benefit or coverage questions.      Please bring the following with you to your appointment:    (1) Any X-Rays, CTs or MRIs which have been performed.  Contact the facility where they were done to arrange for  prior to your scheduled appointment.    (2) List of current medications  (3) This referral request   (4) Any documents/labs given to you for this referral                  Your next 10 appointments already scheduled     Jul 16, 2018  8:30 AM CDT   (Arrive by 8:15 AM)   New Patient Visit with [facility] ([facility])    [address] [place] [id number] [phone]              Future tests that were ordered for you today     Open Future Orders        Priority Expected Expires Ordered    MR Brain w/o & w Contrast Routine  7/12/2019 7/12/2018    MRA Head w/o Contrast Angiogram Routine  7/12/2019 7/12/2018            Who to contact     If you have questions or need follow up information about today's clinic visit or your schedule please contact [facility] directly at [phone].  Normal or  "non-critical lab and imaging results will be communicated to you by MyChart, letter or phone within 4 business days after the clinic has received the results. If you do not hear from us within 7 days, please contact the clinic through [employer] or phone. If you have a critical or abnormal lab result, we will notify you by phone as soon as possible.  Submit refill requests through [employer] or call your pharmacy and they will forward the refill request to us. Please allow 3 business days for your refill to be completed.          Additional Information About Your Visit        [employer] Information     [employer] gives you secure access to your electronic health record. If you see a primary care provider, you can also send messages to your care team and make appointments. If you have questions, please call your primary care clinic.  If you do not have a primary care provider, please call [phone] and they will assist you.        Care EveryWhere ID     This is your Care EveryWhere ID. This could be used by other organizations to access your [place] medical records  [id number]        Your Vitals Were     Pulse Temperature Height Pulse Oximetry BMI (Body Mass Index)       76 98  F (36.7  C) (Oral) 6' 1\" (1.854 m) 97% 27.69 kg/m2        Blood Pressure from Last 3 Encounters:   07/13/18 122/84   07/12/18 116/82   04/10/18 130/82    Weight from Last 3 Encounters:   07/13/18 209 lb (94.8 kg)   07/12/18 209 lb 14.4 oz (95.2 kg)   04/10/18 211 lb (95.7 kg)              We Performed the Following     NEUROLOGY ADULT REFERRAL        Primary Care Provider Office Phone # Fax #    [facility] [phone] [address] [facility] [address]        Equal Access to Services     FILI DELVALLE : Asha Sky, julio albert, joyce valenzuela. So [facility] [phone].    ATENCIÓN: Si habla español, tiene a crump disposición servicios " luis de asistencia lingüística. Zina allen [phone].    We comply with applicable federal civil rights laws and [place] laws. We do not discriminate on the basis of race, color, national origin, age, disability, sex, sexual orientation, or gender identity.            Thank you!     Thank you for choosing [facility]  for your care. Our goal is always to provide you with excellent care. Hearing back from our patients is one way we can continue to improve our services. Please take a few minutes to complete the written survey that you may receive in the mail after your visit with us. Thank you!             Your Updated Medication List - Protect others around you: Learn how to safely use, store and throw away your medicines at www.disposemymeds.org.          This list is accurate as of 7/12/18 11:59 PM.  Always use your most recent med list.                   Brand Name Dispense Instructions for use Diagnosis    albuterol 108 (90 Base) MCG/ACT Inhaler    PROAIR HFA/PROVENTIL HFA/VENTOLIN HFA    1 Inhaler    Inhale 2 puffs into the lungs every 4 hours as needed for shortness of breath / dyspnea or wheezing    Acute bronchitis, unspecified organism       escitalopram 10 MG tablet    LEXAPRO    90 tablet    Take 1 tablet (10 mg) by mouth daily    Anxiety, Moderate single current episode of major depressive disorder (H)       fluticasone 50 MCG/ACT spray    FLONASE    16 g    Spray 2 sprays into both nostrils daily    Post-nasal drip       omeprazole 10 MG CR capsule    priLOSEC    60 capsule    Take by mouth 30-60 minutes before a meal.

## 2018-07-12 NOTE — TELEPHONE ENCOUNTER
Reason for Call: Request for an order or referral:    Order or referral being requested: Referral to Neurology    Date needed: as soon as possible    Has the patient been seen by the PCP for this problem? YES    Additional comments:   [facility] appt is today.  Needs referral sent there asap.    Phone number Patient can be reached at:  Home number on file [phone] (home)    Best Time:  Any    Can we leave a detailed message on this number?  YES    Call taken on 7/12/2018 at 11:05 AM by Rachele Weaver

## 2018-07-12 NOTE — TELEPHONE ENCOUNTER
"Spoke with [facility]- they requested verbal orders to update MRI orders to read \"Contrast per Radiology\"    Huddled with Provider who agrees- Called [facility] back, verbal orders given.     There was no mention of a referral for Radiology needed for this to be completed. Spoke with MRI tech, confirmed no referral needed.     There is a referral for [facility] in system, already.     Khalida MANNING RN        "

## 2018-07-13 ENCOUNTER — OFFICE VISIT (OUTPATIENT)
Dept: FAMILY MEDICINE | Facility: CLINIC | Age: 38
End: 2018-07-13
Payer: COMMERCIAL

## 2018-07-13 VITALS
BODY MASS INDEX: 27.7 KG/M2 | TEMPERATURE: 98 F | SYSTOLIC BLOOD PRESSURE: 122 MMHG | DIASTOLIC BLOOD PRESSURE: 84 MMHG | HEIGHT: 73 IN | OXYGEN SATURATION: 99 % | WEIGHT: 209 LBS | HEART RATE: 71 BPM

## 2018-07-13 DIAGNOSIS — J34.2 DEVIATED NASAL SEPTUM: ICD-10-CM

## 2018-07-13 DIAGNOSIS — M26.609 TEMPOROMANDIBULAR JOINT DISORDER: ICD-10-CM

## 2018-07-13 DIAGNOSIS — R90.89 ABNORMAL BRAIN MRI: ICD-10-CM

## 2018-07-13 DIAGNOSIS — R51.9 NONINTRACTABLE EPISODIC HEADACHE, UNSPECIFIED HEADACHE TYPE: Primary | ICD-10-CM

## 2018-07-13 PROCEDURE — 99214 OFFICE O/P EST MOD 30 MIN: CPT | Performed by: INTERNAL MEDICINE

## 2018-07-13 NOTE — TELEPHONE ENCOUNTER
FUTURE VISIT INFORMATION      FUTURE VISIT INFORMATION:    Date: 07/16/2018    Time:     Location:   REFERRAL INFORMATION:    Referring provider:  Elena Apple MD    Referring providers clinic:      Reason for visit/diagnosis          RECORDS STATUS      Internal Records: [facility], Care Everywhere and pacs.

## 2018-07-13 NOTE — PROGRESS NOTES
SUBJECTIVE:   Rosendo Dallas is a 37 year old male who presents to clinic today for the following health issues:      Headaches      HPI:   Patient Rosendo Dallas is a very pleasant 37 year old male with history of recent new headaches who presents to Internal Medicine clinic today for follow up on MRI results. MRI of the brain was done on 07/12/18 in the afternoon at [place] [facility]. Patient denies any acute new headaches in clinic today. Patient's MRI was abnormal and shows a nonspecific, empty configuration of the sella turcica of unclear clinical significance. MRI also shows bilateral TMJ disorder and nasal septal deviation.        Current Medications:     Current Outpatient Prescriptions   Medication Sig Dispense Refill     escitalopram (LEXAPRO) 10 MG tablet Take 1 tablet (10 mg) by mouth daily 90 tablet 1     omeprazole (PRILOSEC) 10 MG capsule Take by mouth 30-60 minutes before a meal. 60 capsule      albuterol (PROAIR HFA/PROVENTIL HFA/VENTOLIN HFA) 108 (90 Base) MCG/ACT Inhaler Inhale 2 puffs into the lungs every 4 hours as needed for shortness of breath / dyspnea or wheezing (Patient not taking: Reported on 7/12/2018) 1 Inhaler 0     fluticasone (FLONASE) 50 MCG/ACT spray Spray 2 sprays into both nostrils daily (Patient not taking: Reported on 7/12/2018) 16 g 0         Allergies:    No Known Allergies         Past Medical History:     Past Medical History:   Diagnosis Date     IBS (irritable colon syndrome)          Past Surgical History:   No past surgical history on file.      Family Medical History:     Family History   Problem Relation Age of Onset     Hypertension Mother      Hyperlipidemia Mother      Depression Other      dad's side     Diabetes No family hx of      Coronary Artery Disease No family hx of      Cerebrovascular Disease No family hx of      Breast Cancer No family hx of      Colon Cancer No family hx of      Prostate Cancer No family hx of      Thyroid Disease No family hx  "of          Social History:     Social History     Social History     Marital status:      Spouse name: N/A     Number of children: N/A     Years of education: N/A     Occupational History     Not on file.     Social History Main Topics     Smoking status: Never Smoker     Smokeless tobacco: Never Used     Alcohol use Yes      Comment: occassional     Drug use: No     Sexual activity: Yes     Partners: Female     Other Topics Concern     Not on file     Social History Narrative           Review of System:     Constitutional: Negative for fever or chills  Skin: Negative for rashes  Ears/Nose/Throat: Negative for nasal congestion, sore throat, positive nasal septal deviation  Respiratory: No shortness of breath, dyspnea on exertion, cough, or hemoptysis  Cardiovascular: Negative for chest pain  Gastrointestinal: Negative for nausea, vomiting  Genitourinary: Negative for dysuria, hematuria  Musculoskeletal: Negative for myalgias  Neurologic: Positive for recent headaches, negative for acute headaches in clinic today  Psychiatric: Negative for depression, anxiety  Hematologic/Lymphatic/Immunologic: Negative  Endocrine: Negative  Behavioral: Negative for tobacco use       Physical Exam:   /84 (BP Location: Right arm, Patient Position: Sitting, Cuff Size: Adult Large)  Pulse 71  Temp 98  F (36.7  C) (Oral)  Ht 6' 1\" (1.854 m)  Wt 209 lb (94.8 kg)  SpO2 99%  BMI 27.57 kg/m2    GENERAL: alert and no distress  EYES: eyes grossly normal to inspection, and conjunctivae and sclerae normal  HENT: Normocephalic atraumatic. Nose and mouth without ulcers or lesions, nasal septal deviation present, bilateral TMJ symptoms present  NECK: supple  RESP: lungs clear to auscultation   CV: regular rate and rhythm, normal S1 S2  LYMPH: no peripheral edema   ABDOMEN: nondistended  MS: no gross musculoskeletal defects noted  SKIN: no suspicious lesions or rashes  NEURO: Alert & Oriented x 3.   PSYCH: mentation appears " normal, affect normal        Diagnostic Test Results:       Outside MRI of the Brain obtained at [facility] on 7/11/2018 shows:    Conclusion:  1. Nonspecific, empty configuration of the sella turcica with an otherwise normal MR appearance of the brain and intracranial compartment.  2. Bilateral Temporomandibular joint degenerative changes  3. Right deviated nasal septum and spur      ASSESSMENT/PLAN:       (R51) Nonintractable episodic headache, unspecified headache type  (primary encounter diagnosis)  (R90.89) Abnormal brain MRI  Comment: Nonspecific, empty configuration of the sella turcica with an otherwise normal MR appearance of the brain and intracranial compartment.  Plan: I have ordered NEUROLOGY ADULT REFERRAL with the [facility] neurology clinic in [place] for further evaluation and management going forward.      (J34.2) Deviated nasal septum  Comment: new findings of deviated nasal septum on MRI  Plan: I have ordered OTOLARYNGOLOGY REFERRAL for further evaluation and management going forward.      (M26.609) Temporomandibular joint disorder  Comment: chronic TMJ disorder finding on MRI  Plan: I have advised the patient to schedule a new consultation appointment with his dentist office regarding the TMJ disorder finding on MRI.        Follow Up Plan:     Patient is instructed to return to Internal Medicine clinic for follow-up visit in 1 month.        Elena Apple MD  Internal Medicine  [facility]

## 2018-07-13 NOTE — MR AVS SNAPSHOT
After Visit Summary   7/13/2018    Rosendo Dallas    MRN: [id number]           Patient Information     Date Of Birth          1980        Visit Information        Provider Department      7/13/2018 9:40 AM Elena Apple MD [facility]        Today's Diagnoses     Deviated nasal septum    -  1    Abnormal brain MRI        Nonintractable episodic headache, unspecified headache type           Follow-ups after your visit        Additional Services     NEUROLOGY ADULT REFERRAL       Your provider has referred you for the following:   Consult at [facility]: Neurology Clinic [facility] [phone]   [email or web address]  General Neurology    Please be aware that coverage of these services is subject to the terms and limitations of your health insurance plan.  Call member services at your health plan with any benefit or coverage questions.      Please bring the following with you to your appointment:    (1) Any X-Rays, CTs or MRIs which have been performed.  Contact the facility where they were done to arrange for  prior to your scheduled appointment.    (2) List of current medications  (3) This referral request   (4) Any documents/labs given to you for this referral            OTOLARYNGOLOGY REFERRAL       Your provider has referred you to: [facility]: Adult Ear, Nose and Throat Clinic (Otolaryngology) [facility] [phone]  [email or web address]    Please be aware that coverage of these services is subject to the terms and limitations of your health insurance plan.  Call member services at your health plan with any benefit or coverage questions.      Please bring the following with you to your appointment:    (1) Any X-Rays, CTs or MRIs which have been performed.  Contact the facility where they were done to arrange for  prior to your scheduled appointment.   (2) List of current medications  (3) This  "referral request   (4) Any documents/labs given to you for this referral                  Future tests that were ordered for you today     Open Future Orders        Priority Expected Expires Ordered    MR Brain w/o & w Contrast Routine  7/12/2019 7/12/2018    MRA Head w/o Contrast Angiogram Routine  7/12/2019 7/12/2018            Who to contact     If you have questions or need follow up information about today's clinic visit or your schedule please contact [facility] directly at [phone].  Normal or non-critical lab and imaging results will be communicated to you by [employer], letter or phone within 4 business days after the clinic has received the results. If you do not hear from us within 7 days, please contact the clinic through [employer] or phone. If you have a critical or abnormal lab result, we will notify you by phone as soon as possible.  Submit refill requests through [employer] or call your pharmacy and they will forward the refill request to us. Please allow 3 business days for your refill to be completed.          Additional Information About Your Visit        [employer] Information     [employer] gives you secure access to your electronic health record. If you see a primary care provider, you can also send messages to your care team and make appointments. If you have questions, please call your primary care clinic.  If you do not have a primary care provider, please call [phone] and they will assist you.        Care EveryWhere ID     This is your Care EveryWhere ID. This could be used by other organizations to access your [place] medical records  [id number]        Your Vitals Were     Pulse Temperature Height Pulse Oximetry BMI (Body Mass Index)       71 98  F (36.7  C) (Oral) 6' 1\" (1.854 m) 99% 27.57 kg/m2        Blood Pressure from Last 3 Encounters:   07/13/18 122/84   07/12/18 116/82   04/10/18 130/82    Weight from Last 3 Encounters:   07/13/18 209 lb (94.8 kg)   07/12/18 209 lb 14.4 " oz (95.2 kg)   04/10/18 211 lb (95.7 kg)              We Performed the Following     NEUROLOGY ADULT REFERRAL     OTOLARYNGOLOGY REFERRAL        Primary Care Provider Office Phone # Fax #    [facility] [phone] [address] [address]        Equal Access to Services     FILI DELVALLE : Hadii aad ku hadasho Soomaali, waaxda luqadaha, qaybta kaalmada adeegyada, waxay idiin hayaan adeeg kharash labertramn ah. So [facility] [phone].    ATENCIÓN: Si habla español, tiene a crump disposición servicios gratuitos de asistencia lingüística. Llame al [phone].    We comply with applicable federal civil rights laws and [place] laws. We do not discriminate on the basis of race, color, national origin, age, disability, sex, sexual orientation, or gender identity.            Thank you!     Thank you for choosing [facility]  for your care. Our goal is always to provide you with excellent care. Hearing back from our patients is one way we can continue to improve our services. Please take a few minutes to complete the written survey that you may receive in the mail after your visit with us. Thank you!             Your Updated Medication List - Protect others around you: Learn how to safely use, store and throw away your medicines at www.disposemymeds.org.          This list is accurate as of 7/13/18  9:50 AM.  Always use your most recent med list.                   Brand Name Dispense Instructions for use Diagnosis    albuterol 108 (90 Base) MCG/ACT Inhaler    PROAIR HFA/PROVENTIL HFA/VENTOLIN HFA    1 Inhaler    Inhale 2 puffs into the lungs every 4 hours as needed for shortness of breath / dyspnea or wheezing    Acute bronchitis, unspecified organism       escitalopram 10 MG tablet    LEXAPRO    90 tablet    Take 1 tablet (10 mg) by mouth daily    Anxiety, Moderate single current episode of major depressive disorder (H)       fluticasone 50 MCG/ACT spray    FLONASE     16 g    Spray 2 sprays into both nostrils daily    Post-nasal drip       omeprazole 10 MG CR capsule    priLOSEC    60 capsule    Take by mouth 30-60 minutes before a meal.

## 2018-07-16 ENCOUNTER — OFFICE VISIT (OUTPATIENT)
Dept: NEUROLOGY | Facility: CLINIC | Age: 38
End: 2018-07-16
Payer: COMMERCIAL

## 2018-07-16 ENCOUNTER — PRE VISIT (OUTPATIENT)
Dept: NEUROLOGY | Facility: CLINIC | Age: 38
End: 2018-07-16

## 2018-07-16 VITALS
DIASTOLIC BLOOD PRESSURE: 86 MMHG | HEART RATE: 76 BPM | WEIGHT: 212.4 LBS | BODY MASS INDEX: 28.15 KG/M2 | HEIGHT: 73 IN | SYSTOLIC BLOOD PRESSURE: 128 MMHG

## 2018-07-16 DIAGNOSIS — R51.9 NONINTRACTABLE HEADACHE, UNSPECIFIED CHRONICITY PATTERN, UNSPECIFIED HEADACHE TYPE: Primary | ICD-10-CM

## 2018-07-16 DIAGNOSIS — E23.6 EMPTY SELLA (H): ICD-10-CM

## 2018-07-16 ASSESSMENT — ENCOUNTER SYMPTOMS
SEIZURES: 0
DOUBLE VISION: 0
LOSS OF CONSCIOUSNESS: 0
PANIC: 0
NUMBNESS: 0
DEPRESSION: 0
EYE PAIN: 0
HEADACHES: 1
EYE IRRITATION: 1
PARALYSIS: 0
DIZZINESS: 0
NERVOUS/ANXIOUS: 1
EYE REDNESS: 0
TREMORS: 0
DISTURBANCES IN COORDINATION: 0
MEMORY LOSS: 0
EYE WATERING: 1
DECREASED CONCENTRATION: 0
INSOMNIA: 1
WEAKNESS: 0
SPEECH CHANGE: 0
TINGLING: 0

## 2018-07-16 ASSESSMENT — PAIN SCALES - GENERAL: PAINLEVEL: NO PAIN (0)

## 2018-07-16 NOTE — MR AVS SNAPSHOT
"              After Visit Summary   7/16/2018    Rosendo Dallas    MRN: [id number]           Patient Information     Date Of Birth          1980        Visit Information        Provider Department      7/16/2018 8:30 AM Lorena Lemos APRN [facility]        Care Instructions    Recommended to call our Clinic with a persistent headache.   Do not take ibuprofen or any other analgesics on the daily basis. May take acute analgesics no more than 2 days per week as needed  Monitor for any visual changes or any worsening of headache  Observe for any evidence of sleep apnea  Talk to your dentist about TMJ  Follow up as needed              Follow-ups after your visit        Who to contact     Please call your clinic at [phone] to:    Ask questions about your health    Make or cancel appointments    Discuss your medicines    Learn about your test results    Speak to your doctor            Additional Information About Your Visit        MyChart Information     [employer] gives you secure access to your electronic health record. If you see a primary care provider, you can also send messages to your care team and make appointments. If you have questions, please call your primary care clinic.  If you do not have a primary care provider, please call [phone] and they will assist you.      [employer] is an electronic gateway that provides easy, online access to your medical records. With [employer], you can request a clinic appointment, read your test results, renew a prescription or communicate with your care team.     To access your existing account, please contact your [facility] or call [phone] for assistance.        Care EveryWhere ID     This is your Care EveryWhere ID. This could be used by other organizations to access your [place] medical records  [id number]        Your Vitals Were     Pulse Height BMI (Body Mass Index)             76 1.854 m (6' 1\") " 28.02 kg/m2          Blood Pressure from Last 3 Encounters:   07/16/18 128/86   07/13/18 122/84   07/12/18 116/82    Weight from Last 3 Encounters:   07/16/18 96.3 kg (212 lb 6.4 oz)   07/13/18 94.8 kg (209 lb)   07/12/18 95.2 kg (209 lb 14.4 oz)              Today, you had the following     No orders found for display       Primary Care Provider Office Phone # Fax #    [facility] [phone] [address] [facility] [address]        Equal Access to Services     [facility]: Hadii greg Sky, waaxda roosevelt, qaybta kaalmada taryn, joyce ambrocio . So [facility] [phone].    ATENCIÓN: Si habla español, tiene a crump disposición servicios gratuitos de asistencia lingüística. Llame al [phone].    We comply with applicable federal civil rights laws and [place] laws. We do not discriminate on the basis of race, color, national origin, age, disability, sex, sexual orientation, or gender identity.            Thank you!     Thank you for choosing [facility] NEUROLOGY  for your care. Our goal is always to provide you with excellent care. Hearing back from our patients is one way we can continue to improve our services. Please take a few minutes to complete the written survey that you may receive in the mail after your visit with us. Thank you!             Your Updated Medication List - Protect others around you: Learn how to safely use, store and throw away your medicines at www.disposemymeds.org.          This list is accurate as of 7/16/18  9:30 AM.  Always use your most recent med list.                   Brand Name Dispense Instructions for use Diagnosis    escitalopram 10 MG tablet    LEXAPRO    90 tablet    Take 1 tablet (10 mg) by mouth daily    Anxiety, Moderate single current episode of major depressive disorder (H)       omeprazole 10 MG CR capsule    priLOSEC    60 capsule    Take by mouth 30-60 minutes before a meal.

## 2018-07-16 NOTE — PROGRESS NOTES
"Re: Rosendo Dallas      MRN# [id number]  YOB: 1980  Date of Visit:7/16/2018     OUTPATIENT NEUROLOGY VISIT NOTE    Chief Complaint:  Headache evaluation    History of Present Illness  Rosendo Dallas is a 37-year-old right-handed presents to the clinic today for headache evaluation.    Headache History:    Onset History:  Occasional headaches in the past but no history of migraine headaches. Reports that his typical headache would be once every couple weeks both sides more to the back and triggered by dehydration and would last an hour and treated with tylenol or ibuprofen.   Current and \"not typical headache\" onset late June of 2018. Reports that headache has been always \"there\" dull and \"low\" pressure and tension behind the eyes. No debilitating but there. In the morning in the back of his head and towards day behind his eyes and more to the left jaw line. Patient reports that headache would be daily. No light or noise sensitivity. No nausea or vomiting. Not positional. No visual disturbances. Pain would be 4-5/10 on the numeric pain scale. Reports neck stiffness and neck pain a couple of days before headache onset. Reports that has been taking on and off ibuprofen (every other day).   Headache has felt better in the last 3 days.   Reports that he was activity with his kids (couching baseball almost daily) and running.   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   No pain in his TMJ and no grinding but reports that his dentist told him that evidence \"teeth grinding\"  Lifestyles:  Exercises daily  Screen -a couple hours per day  Hydration -about 6 glasses or water per day  Caffeine -one cup am and not usually soda  May be 1-2 concussions in HS but nothing recently.   No MVA or any other injuries  No travel  No exposure to infectious illnesses  No anxiety issues currently    Denies history of head or neck trauma, dizziness, vertigo, loss of consciousness, seizure, " double vision, blurred vision, hearing difficulty, speech or swallowing difficulty, weakness or numbness in face, arms or legs, urinary or bowel incontinence, coordination problems or gait difficulty, fever or chills.    Neurodiagnostic Testing  CT head none  CDI MRI brain reviewed-nonspecific empty sella and no other acute findings reported per results reviewed except chronic TMJ changes.   Labs reviewed  Normal TSH in 2017  Normal CBC in 2017  Past Medical History reviewed and verified with the patient  More episodic anxiety  Past Medical History:   Diagnosis Date     Head injury 15 + years ago    Likely had 1 to 2 concussions in  and college athletics     IBS (irritable colon syndrome)        Past Surgical History reviewed and verified with the patient  Past Surgical History:   Procedure Laterality Date     COLONOSCOPY  3 years ago    No concerns     ORTHOPEDIC SURGERY  04/18    Mensicus / ACL surgery       Family History reviewed and verified with the patient  No neurological history  Social History:  , kids -2, full time work  Social History   Substance Use Topics     Smoking status: Never Smoker     Smokeless tobacco: Never Used     Alcohol use Yes      Comment: 2 - 3 drinks a week    reviewed and verified with the patient     Allergies   Allergen Reactions     Seasonal Allergies Itching       Current Outpatient Prescriptions   Medication Sig Dispense Refill     escitalopram (LEXAPRO) 10 MG tablet Take 1 tablet (10 mg) by mouth daily 90 tablet 1     omeprazole (PRILOSEC) 10 MG capsule Take by mouth 30-60 minutes before a meal. 60 capsule    reviewed and verified with the patient    Review of Systems:  A 12-point ROS including constitutional, eyes, ENT, respiratory, cardiovascular, gastroenterology, genitourinary, integumentary, musculoskeletal, neurology, hematology and psychiatric were all reviewed with the patient and completed at the [facility] Question nary and as mentioned in the HPI.  "  General Exam:   /86 (BP Location: Right arm, Patient Position: Sitting, Cuff Size: Adult Large)  Pulse 76  Ht 1.854 m (6' 1\")  Wt 96.3 kg (212 lb 6.4 oz)  BMI 28.02 kg/m2  GEN: Awake, NAD; good eye contact, responses appropriately, no headache today   HEENT: Head atraumatic/Normocephalic. Scalp normal. Pupils equally round, 4 mm, reactive to light and accommodation, sclera and conjunctiva normal. Fundoscopic examination reveals normal vessels no papilledema.   Neck: Easily moveable without resistance  Heart: S1/S2 appreciated, RRR, no m/r/g, no carotid bruits  Lungs:Lungs are clear to auscultation bilaterally, no wheezes or crackles.   Neurological Examination:  The patient is alert and oriented times four. Has good attention and concentration.Speech is fluent without dysarthria.   Cranial nerves:  CN I deferred.   CN II: Intact and full visual fields to confrontation bilaterally. Funduscopic exam revealed disc bilaterally.   CN III, IV, VI: EOM intact. There is no nystagmus. Has conjugated gaze. Intact direct and consensual pupillary light reflexes.   CN V: Intact and symmetrical to facial sensation in the V1 through V3 bilaterally.   CN VII: Intact and symmetrical eyebrow and lid raise and eyelid closure, smiles and frown.   CN VIII: Intact to finger rub bilaterally.   CN IX and X: The palates elevates symmetrical. The uvula is midline.   CN XII: The tongue protrudes midline with no atrophy or fasciculations.   Motor exam: The patient has a normal bulk and tone throughout. There is no atrophy, fasciculations, clonus, or abnormal movements appreciated.   Strength Exam:  5/5 strength at shoulder abduction, elbow flexion or extension, wrist flexion or extension, finger abduction, , hip flexion and extension, knee flexion and extension, and dorsiflexion and plantarflexion bilaterally.   Sensation is intact to light touch and pinprick throughout. Reflexes are symmetrical at biceps, triceps, " brachioradialis, patellar, and Achilles.   Negative Babinski with downgoing toes bilaterally.   Coordination reveals finger-nose-finger with normal speed and accuracy.   Station and gait is normal. There is no ataxia. Can walk on the toes, heels, and tandem walk without difficulty. Has good postural reflexes.Has no drift and a negative Romberg. Nacho's negative          Assessment and Plan:  Headache has been improving and no headache for the past 2 days. No headache today. Patient declines any headache treatment at this time and recommended to call our Clinic with a persistent headache.   Brain MRI findings of empty sella but no clinical symptoms to support this nonspecific findings-no positional headaches or visual problems to suggest IIH in this physically fit and non obese male. No papilledema on the exam today. Recommended to monitor and return to our Clinic with any symptoms suggestive of persistent elevated intracranial pressure. Spoke to one of our neurologist about the brain MRI findings and no further recommendations were given if patient asymptomatic.     Recommended to call our Clinic with a persistent headache.In meanwhile we discussed headache prevention with the patient.    Do not take ibuprofen or any other analgesics on the daily basis. May take acute analgesics no more than 2 days per week as needed  Monitor for any visual changes or any worsening of headache  Observe for any evidence of sleep apnea  Talk to your dentist about TMJ  Follow up as needed    I discussed all my recommendation with Rosendo Dallas. The patient verbalizes understanding and comfortable with the plan. The patient has our clinic phone number to call with any questions or concerns. All of the patient's questions were answered from the best of my current knowledge.     Thank you for letting me be a part of the treatment team for Rosendo Dallas      Time spent with pt answering questions, discussing findings, counseling and  coordinating care was more than 50% the appointment time, 62  minutes.       BINTA Beebe, CNP  [facility]    Answers for HPI/ROS submitted by the patient on 7/16/2018   General Symptoms: No  Skin Symptoms: No  HENT Symptoms: No  EYE SYMPTOMS: Yes  HEART SYMPTOMS: No  LUNG SYMPTOMS: No  INTESTINAL SYMPTOMS: No  URINARY SYMPTOMS: No  REPRODUCTIVE SYMPTOMS: No  SKELETAL SYMPTOMS: No  BLOOD SYMPTOMS: No  NERVOUS SYSTEM SYMPTOMS: Yes  MENTAL HEALTH SYMPTOMS: Yes  Eye pain: No  Vision loss: No  Dry eyes: No  Watery eyes: Yes  Eye bulging: No  Double vision: No  Flashing of lights: No  Spots: No  Floaters: No  Redness: No  Crossed eyes: No  Tunnel Vision: No  Yellowing of eyes: No  Eye irritation: Yes  Trouble with coordination: No  Dizziness or trouble with balance: No  Fainting or black-out spells: No  Memory loss: No  Headache: Yes  Seizures: No  Speech problems: No  Tingling: No  Tremor: No  Weakness: No  Difficulty walking: No  Paralysis: No  Numbness: No  Nervous or Anxious: Yes  Depression: No  Trouble sleeping: Yes  Trouble thinking or concentrating: No  Mood changes: No  Panic attacks: No

## 2018-07-16 NOTE — PATIENT INSTRUCTIONS
Recommended to call our Clinic with a persistent headache.   Do not take ibuprofen or any other analgesics on the daily basis. May take acute analgesics no more than 2 days per week as needed  Monitor for any visual changes or any worsening of headache  Observe for any evidence of sleep apnea  Talk to your dentist about TMJ  Follow up as needed

## 2018-07-16 NOTE — LETTER
"7/16/2018       RE: Rosendo Dallas  [address] [facility] [address]     Dear Colleague,    Thank you for referring your patient, Rosendo Dallas, to the [facility] at [facility]. Please see a copy of my visit note below.    Re: Rosendo Dallas      MRN# [id number]  YOB: 1980  Date of Visit:7/16/2018     OUTPATIENT NEUROLOGY VISIT NOTE    Chief Complaint:  Headache evaluation    History of Present Illness  Rosendo Dallas is a 37-year-old right-handed presents to the clinic today for headache evaluation.    Headache History:    Onset History:  Occasional headaches in the past but no history of migraine headaches. Reports that his typical headache would be once every couple weeks both sides more to the back and triggered by dehydration and would last an hour and treated with tylenol or ibuprofen.   Current and \"not typical headache\" onset late June of 2018. Reports that headache has been always \"there\" dull and \"low\" pressure and tension behind the eyes. No debilitating but there. In the morning in the back of his head and towards day behind his eyes and more to the left jaw line. Patient reports that headache would be daily. No light or noise sensitivity. No nausea or vomiting. Not positional. No visual disturbances. Pain would be 4-5/10 on the numeric pain scale. Reports neck stiffness and neck pain a couple of days before headache onset. Reports that has been taking on and off ibuprofen (every other day).   Headache has felt better in the last 3 days.   Reports that he was activity with his kids (couching baseball almost daily) and running.   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   No pain in his TMJ and no grinding but reports that his dentist told him that evidence \"teeth grinding\"  Lifestyles:  Exercises daily  Screen -a couple hours per day  Hydration -about 6 glasses or water per day  Caffeine -one cup am and " not usually soda  May be 1-2 concussions in HS but nothing recently.   No MVA or any other injuries  No travel  No exposure to infectious illnesses  No anxiety issues currently    Denies history of head or neck trauma, dizziness, vertigo, loss of consciousness, seizure, double vision, blurred vision, hearing difficulty, speech or swallowing difficulty, weakness or numbness in face, arms or legs, urinary or bowel incontinence, coordination problems or gait difficulty, fever or chills.    Neurodiagnostic Testing  CT head none  CDI MRI brain reviewed-nonspecific empty sella and no other acute findings reported per results reviewed except chronic TMJ changes.   Labs reviewed  Normal TSH in 2017  Normal CBC in 2017  Past Medical History reviewed and verified with the patient  More episodic anxiety  Past Medical History:   Diagnosis Date     Head injury 15 + years ago    Likely had 1 to 2 concussions in HS and college athletics     IBS (irritable colon syndrome)        Past Surgical History reviewed and verified with the patient  Past Surgical History:   Procedure Laterality Date     COLONOSCOPY  3 years ago    No concerns     ORTHOPEDIC SURGERY  04/18    Mensicus / ACL surgery       Family History reviewed and verified with the patient  No neurological history  Social History:  , kids -2, full time work  Social History   Substance Use Topics     Smoking status: Never Smoker     Smokeless tobacco: Never Used     Alcohol use Yes      Comment: 2 - 3 drinks a week    reviewed and verified with the patient     Allergies   Allergen Reactions     Seasonal Allergies Itching       Current Outpatient Prescriptions   Medication Sig Dispense Refill     escitalopram (LEXAPRO) 10 MG tablet Take 1 tablet (10 mg) by mouth daily 90 tablet 1     omeprazole (PRILOSEC) 10 MG capsule Take by mouth 30-60 minutes before a meal. 60 capsule    reviewed and verified with the patient    Review of Systems:  A 12-point ROS including  "constitutional, eyes, ENT, respiratory, cardiovascular, gastroenterology, genitourinary, integumentary, musculoskeletal, neurology, hematology and psychiatric were all reviewed with the patient and completed at the [facility] Question gail and as mentioned in the HPI.   General Exam:   /86 (BP Location: Right arm, Patient Position: Sitting, Cuff Size: Adult Large)  Pulse 76  Ht 1.854 m (6' 1\")  Wt 96.3 kg (212 lb 6.4 oz)  BMI 28.02 kg/m2  GEN: Awake, NAD; good eye contact, responses appropriately, no headache today   HEENT: Head atraumatic/Normocephalic. Scalp normal. Pupils equally round, 4 mm, reactive to light and accommodation, sclera and conjunctiva normal. Fundoscopic examination reveals normal vessels no papilledema.   Neck: Easily moveable without resistance  Heart: S1/S2 appreciated, RRR, no m/r/g, no carotid bruits  Lungs:Lungs are clear to auscultation bilaterally, no wheezes or crackles.   Neurological Examination:  The patient is alert and oriented times four. Has good attention and concentration.Speech is fluent without dysarthria.   Cranial nerves:  CN I deferred.   CN II: Intact and full visual fields to confrontation bilaterally. Funduscopic exam revealed disc bilaterally.   CN III, IV, VI: EOM intact. There is no nystagmus. Has conjugated gaze. Intact direct and consensual pupillary light reflexes.   CN V: Intact and symmetrical to facial sensation in the V1 through V3 bilaterally.   CN VII: Intact and symmetrical eyebrow and lid raise and eyelid closure, smiles and frown.   CN VIII: Intact to finger rub bilaterally.   CN IX and X: The palates elevates symmetrical. The uvula is midline.   CN XII: The tongue protrudes midline with no atrophy or fasciculations.   Motor exam: The patient has a normal bulk and tone throughout. There is no atrophy, fasciculations, clonus, or abnormal movements appreciated.   Strength Exam:  5/5 strength at shoulder abduction, elbow flexion or " extension, wrist flexion or extension, finger abduction, , hip flexion and extension, knee flexion and extension, and dorsiflexion and plantarflexion bilaterally.   Sensation is intact to light touch and pinprick throughout. Reflexes are symmetrical at biceps, triceps, brachioradialis, patellar, and Achilles.   Negative Babinski with downgoing toes bilaterally.   Coordination reveals finger-nose-finger with normal speed and accuracy.   Station and gait is normal. There is no ataxia. Can walk on the toes, heels, and tandem walk without difficulty. Has good postural reflexes.Has no drift and a negative Romberg. Nacho's negative          Assessment and Plan:  Headache has been improving and no headache for the past 2 days. No headache today. Patient declines any headache treatment at this time and recommended to call our Clinic with a persistent headache.   Brain MRI findings of empty sella but no clinical symptoms to support this nonspecific findings-no positional headaches or visual problems to suggest IIH in this physically fit and non obese male. No papilledema on the exam today. Recommended to monitor and return to our Clinic with any symptoms suggestive of persistent elevated intracranial pressure. Spoke to one of our neurologist about the brain MRI findings and no further recommendations were given if patient asymptomatic.     Recommended to call our Clinic with a persistent headache.In meanwhile we discussed headache prevention with the patient.    Do not take ibuprofen or any other analgesics on the daily basis. May take acute analgesics no more than 2 days per week as needed  Monitor for any visual changes or any worsening of headache  Observe for any evidence of sleep apnea  Talk to your dentist about TMJ  Follow up as needed    I discussed all my recommendation with Rosendo Dallas. The patient verbalizes understanding and comfortable with the plan. The patient has our clinic phone number to call with  any questions or concerns. All of the patient's questions were answered from the best of my current knowledge.     Thank you for letting me be a part of the treatment team for Rosendo Dallas      Time spent with pt answering questions, discussing findings, counseling and coordinating care was more than 50% the appointment time, 62  minutes.       BINTA Beebe, [facility]

## 2018-07-26 ENCOUNTER — TRANSFERRED RECORDS (OUTPATIENT)
Dept: HEALTH INFORMATION MANAGEMENT | Facility: CLINIC | Age: 38
End: 2018-07-26

## 2018-07-31 ENCOUNTER — TRANSFERRED RECORDS (OUTPATIENT)
Dept: HEALTH INFORMATION MANAGEMENT | Facility: CLINIC | Age: 38
End: 2018-07-31

## 2018-08-06 ENCOUNTER — TRANSFERRED RECORDS (OUTPATIENT)
Dept: HEALTH INFORMATION MANAGEMENT | Facility: CLINIC | Age: 38
End: 2018-08-06

## 2018-09-16 ENCOUNTER — OFFICE VISIT (OUTPATIENT)
Dept: URGENT CARE | Facility: URGENT CARE | Age: 38
End: 2018-09-16
Payer: COMMERCIAL

## 2018-09-16 VITALS
HEART RATE: 73 BPM | DIASTOLIC BLOOD PRESSURE: 76 MMHG | SYSTOLIC BLOOD PRESSURE: 110 MMHG | TEMPERATURE: 98.7 F | RESPIRATION RATE: 20 BRPM | BODY MASS INDEX: 27.05 KG/M2 | WEIGHT: 205 LBS

## 2018-09-16 DIAGNOSIS — R10.13 ABDOMINAL PAIN, EPIGASTRIC: Primary | ICD-10-CM

## 2018-09-16 LAB
ALBUMIN SERPL-MCNC: 4.4 G/DL (ref 3.4–5)
ALP SERPL-CCNC: 66 U/L (ref 40–150)
ALT SERPL W P-5'-P-CCNC: 37 U/L (ref 0–70)
ANION GAP SERPL CALCULATED.3IONS-SCNC: 6 MMOL/L (ref 3–14)
AST SERPL W P-5'-P-CCNC: 23 U/L (ref 0–45)
BILIRUB SERPL-MCNC: 2.1 MG/DL (ref 0.2–1.3)
BUN SERPL-MCNC: 16 MG/DL (ref 7–30)
CALCIUM SERPL-MCNC: 8.8 MG/DL (ref 8.5–10.1)
CHLORIDE SERPL-SCNC: 102 MMOL/L (ref 94–109)
CO2 SERPL-SCNC: 29 MMOL/L (ref 20–32)
CREAT SERPL-MCNC: 0.91 MG/DL (ref 0.66–1.25)
ERYTHROCYTE [DISTWIDTH] IN BLOOD BY AUTOMATED COUNT: 12.2 % (ref 10–15)
GFR SERPL CREATININE-BSD FRML MDRD: >90 ML/MIN/1.7M2
GLUCOSE SERPL-MCNC: 100 MG/DL (ref 70–99)
HCT VFR BLD AUTO: 46.4 % (ref 40–53)
HGB BLD-MCNC: 16.5 G/DL (ref 13.3–17.7)
MCH RBC QN AUTO: 30.8 PG (ref 26.5–33)
MCHC RBC AUTO-ENTMCNC: 35.6 G/DL (ref 31.5–36.5)
MCV RBC AUTO: 87 FL (ref 78–100)
PLATELET # BLD AUTO: 211 10E9/L (ref 150–450)
POTASSIUM SERPL-SCNC: 4.2 MMOL/L (ref 3.4–5.3)
PROT SERPL-MCNC: 7.7 G/DL (ref 6.8–8.8)
RBC # BLD AUTO: 5.36 10E12/L (ref 4.4–5.9)
SODIUM SERPL-SCNC: 137 MMOL/L (ref 133–144)
WBC # BLD AUTO: 4.7 10E9/L (ref 4–11)

## 2018-09-16 PROCEDURE — 80053 COMPREHEN METABOLIC PANEL: CPT | Performed by: PHYSICIAN ASSISTANT

## 2018-09-16 PROCEDURE — 85027 COMPLETE CBC AUTOMATED: CPT | Performed by: PHYSICIAN ASSISTANT

## 2018-09-16 PROCEDURE — 36415 COLL VENOUS BLD VENIPUNCTURE: CPT | Performed by: PHYSICIAN ASSISTANT

## 2018-09-16 PROCEDURE — 99213 OFFICE O/P EST LOW 20 MIN: CPT | Performed by: PHYSICIAN ASSISTANT

## 2018-09-16 RX ORDER — SUCRALFATE 1 G/1
1 TABLET ORAL 4 TIMES DAILY
Qty: 56 TABLET | Refills: 0 | Status: SHIPPED | OUTPATIENT
Start: 2018-09-16 | End: 2018-09-30

## 2018-09-16 NOTE — PROGRESS NOTES
SUBJECTIVE:  Chief Complaint   Patient presents with     Abdominal Pain     epigastric burning and some dull pain into back for past week,some diarrhea,some nausea     Rosendo Dallas is a 37 year old male whose symptoms began 7-8 days ago and include abdominal pain epigastric radiating to back and diarrhea.  Has increased omeprazol to 2 pills the last few days.   Symptoms are still present and worsening and mild.    Aggravating factors: eating, pain starts about an hour afterward.  Dull ache 4-5/10 at worst  Does not use nsaids    Past Medical History:   Diagnosis Date     Head injury 15 + years ago    Likely had 1 to 2 concussions in  and college athletics     IBS (irritable colon syndrome)    .  Current Outpatient Prescriptions   Medication Sig Dispense Refill     escitalopram (LEXAPRO) 10 MG tablet Take 1 tablet (10 mg) by mouth daily 90 tablet 1     omeprazole (PRILOSEC) 10 MG capsule Take by mouth 30-60 minutes before a meal. 60 capsule      Social History   Substance Use Topics     Smoking status: Never Smoker     Smokeless tobacco: Never Used     Alcohol use Yes      Comment: 2 - 3 drinks a week       ROS:  Review of systems negative except as stated above.    OBJECTIVE:  /76 (Cuff Size: Adult Regular)  Pulse 73  Temp 98.7  F (37.1  C) (Oral)  Resp 20  Wt 205 lb (93 kg)  BMI 27.05 kg/m2  GENERAL APPEARANCE: healthy, alert and no distress  EYES: conjunctiva clear  HENT: ear canals and TM's normal.  Nose and mouth without ulcers, erythema or lesions  NECK: supple, nontender, no lymphadenopathy  RESP: lungs clear to auscultation - no rales, rhonchi or wheezes  CV: regular rates and rhythm, normal S1 S2, no murmur noted  ABDOMEN:  soft, nontender, no HSM or masses and bowel sounds normal  NEURO: Normal strength and tone, sensory exam grossly normal,  normal speech and mentation  SKIN: no suspicious lesions or rashes    ASSESSMENT:  (R10.13) Abdominal pain, epigastric  (primary encounter  diagnosis)  Comment: Improved symptoms with GI cocktail. Likely duodenal ulcer.  Recommending continue increase in omepr. For at least two more weeks.  May add Carafate.  Follow up if worsening or persisting.   Plan: Comprehensive metabolic panel (BMP + Alb, Alk         Phos, ALT, AST, Total. Bili, TP), CBC with         platelets, sucralfate (CARAFATE) 1 GM tablet,         lidocaine (viscous) 15 mL, alum & mag         hydroxide-simethicone 15 mL GI Cocktail    Red flags and emergent follow up discussed, and understood by patient  Follow up with PCP if symptoms worsen or fail to improve          Patient Instructions       Peptic Ulcer     Peptic ulcers can occur in the stomach or duodenum.   A peptic ulcer is a sore in the lining of your stomach or in the first part of your small intestine (duodenum). It can cause belly (abdominal) pain and other symptoms. In some cases, a peptic ulcer may get worse. This can lead to serious problems such as bleeding or a hole (perforation) in the stomach or duodenum. Treatment is needed to prevent these complications. With treatment, most people fully recover.  Common causes of a peptic ulcer  A peptic ulcer can be caused by:    Infection from H. pylori (Helicobacter pylori) bacteria    Long-term use of pain medicines called non-steroidal anti-inflammatory drugs (NSAIDs), such as aspirin and ibuprofen    Physical stress such as burns, head injuries, needing a breathing tube, or being on blood- thinning medicine    Other rare causes  How a peptic ulcer forms  The lining of your stomach and duodenum is coated with a thick mucus layer. This helps protect the stomach and duodenum from the acids and enzymes they make to help break down the food you eat. When H. pylori is present, it can weaken the mucus layer and irritate the lining underneath. Acid may pass through the weakened mucus layer and cause an ulcer to form. Overuse of NSAIDs can also damage the mucus layer, leading to  ulcers.  Symptoms of a peptic ulcer  Peptic ulcers may or may not cause symptoms. If you do have symptoms, they may come and go. They may also range from mild to severe. Common symptoms include:    Burning, cramping, or hunger-like pain in the stomach area (often 1 to 3 hours after a meal, or in the middle of the night)    Pain that gets better or worse with eating    Loss of appetite    Weight loss    Nausea or vomiting (vomit may be bloody or look like coffee grounds)    Black, tarry, or bloody stools (this means the ulcer is bleeding)  Diagnosing a peptic ulcer  Your healthcare provider will ask about your symptoms and health history. You ll also have a physical exam. In addition, tests will be done to confirm the problem. These tests also help determine if an ulcer is caused by H. pylori. Tests can include:    Blood, stool, or breath tests. These are done to check for H. pylori and other problems. For blood and stool tests, small samples of your blood and stool are taken. The samples are sent to a lab for exam. For a breath test, you'll drink a liquid that contains a harmless compound. H pylori causes the compound to break down and release carbon dioxide gas. By testing the air you breathe out after you drink the liquid, the doctor can tell if the bacteria are present.    Upper endoscopy. This test is done to see inside the stomach and duodenum. This lets your provider check for ulcers. During the test, an endoscope (scope) is used. This is a thin, flexible tube with a tiny camera on the end. The scope is inserted through your mouth. It is then guided down into your stomach or duodenum. If needed, tiny tools may be passed through the scope to take tissue samples (biopsy). Treatment can also be done at the same time if bleeding or other problems are found.    Upper gastrointestinal (GI) series. This test is done to take X-rays of your upper digestive tract from your mouth to the small intestine. This lets your  provider check for ulcers. For this test, you ll drink a milky liquid that has a substance called barium. The barium coats your upper digestive tract so that it will show up clearly on X-rays.  Treating a peptic ulcer  Medicines are the most common treatment for peptic ulcers. They include:    Antibiotics. These kill H. pylori bacteria. In many cases, you ll need to take at least 2 types of antibiotics.    Proton pump inhibitors. These block your stomach from making any acid.    H2 blockers. These reduce the amount of acid your stomach makes.    Bismuth subsalicylate. This helps protect the lining of your stomach and duodenum from acid.  Be sure to take all of the medicines that you re prescribed exactly as instructed. Don t stop taking the medicines, even if you are feeling better. The medicines may cause side effects. Your healthcare provider will tell you more about these based on which medicines you are prescribed. During treatment, avoid taking aspirin and other NSAIDs. You may also be told to avoid cigarettes, alcohol, and caffeine. These may worsen your symptoms or affect how well your ulcer heals. If your stress level is high, reducing stress may help.  In severe cases, other treatments will likely be done. These may include procedures with an endoscope or surgery. Your provider will tell you more about these treatments, if needed.  When to call your healthcare provider  Call your provider right away if you have any of the following:    Fever of 100.4 F (38 C) or higher, or as advised by your provider    Frequent vomiting, blood in your vomit, or coffee ground-like substance in your vomit     Sudden or severe abdominal pain    Dark, tarry, or bloody stools    Weight loss    Pain that doesn't get better, even with treatment   Preventing a peptic ulcer  Take the following steps to help prevent a peptic ulcer:    Reduce your risk of H. pylori infection. Most experts think that H. pylori can be passed through  food or water that is contaminated by an infected person. For your safety, the CDC recommends following basic hygiene. For instance, always wash your hands with warm water and soap after using the bathroom. Also wash your hands before preparing and handling food. And only drink water from safe sources.      Limit the use of aspirin and NSAIDs. Work with your provider to change your medicine or to reduce the amount of NSAIDs you take. If you must take aspirin or NSAIDs, ask your provider about other pain medicines you can take to help protect your stomach and duodenum. Sometimes other pain medicines can be used instead. But if you take aspirin because of a heart condition, blood clot, or stroke, talk with your healthcare provider first before you stop taking aspirin.    Quit smoking. Quit other forms of tobacco too.  Date Last Reviewed: 7/1/2016 2000-2017 [employer]. [address] [place] [address], [place] [address]. All rights reserved. This information is not intended as a substitute for professional medical care. Always follow your healthcare professional's instructions.                  Follow-up with PCP if not improving  Gi cocktail

## 2018-09-16 NOTE — PATIENT INSTRUCTIONS
Peptic Ulcer     Peptic ulcers can occur in the stomach or duodenum.   A peptic ulcer is a sore in the lining of your stomach or in the first part of your small intestine (duodenum). It can cause belly (abdominal) pain and other symptoms. In some cases, a peptic ulcer may get worse. This can lead to serious problems such as bleeding or a hole (perforation) in the stomach or duodenum. Treatment is needed to prevent these complications. With treatment, most people fully recover.  Common causes of a peptic ulcer  A peptic ulcer can be caused by:    Infection from H. pylori (Helicobacter pylori) bacteria    Long-term use of pain medicines called non-steroidal anti-inflammatory drugs (NSAIDs), such as aspirin and ibuprofen    Physical stress such as burns, head injuries, needing a breathing tube, or being on blood- thinning medicine    Other rare causes  How a peptic ulcer forms  The lining of your stomach and duodenum is coated with a thick mucus layer. This helps protect the stomach and duodenum from the acids and enzymes they make to help break down the food you eat. When H. pylori is present, it can weaken the mucus layer and irritate the lining underneath. Acid may pass through the weakened mucus layer and cause an ulcer to form. Overuse of NSAIDs can also damage the mucus layer, leading to ulcers.  Symptoms of a peptic ulcer  Peptic ulcers may or may not cause symptoms. If you do have symptoms, they may come and go. They may also range from mild to severe. Common symptoms include:    Burning, cramping, or hunger-like pain in the stomach area (often 1 to 3 hours after a meal, or in the middle of the night)    Pain that gets better or worse with eating    Loss of appetite    Weight loss    Nausea or vomiting (vomit may be bloody or look like coffee grounds)    Black, tarry, or bloody stools (this means the ulcer is bleeding)  Diagnosing a peptic ulcer  Your healthcare provider will ask about your symptoms and  health history. You ll also have a physical exam. In addition, tests will be done to confirm the problem. These tests also help determine if an ulcer is caused by H. pylori. Tests can include:    Blood, stool, or breath tests. These are done to check for H. pylori and other problems. For blood and stool tests, small samples of your blood and stool are taken. The samples are sent to a lab for exam. For a breath test, you'll drink a liquid that contains a harmless compound. H pylori causes the compound to break down and release carbon dioxide gas. By testing the air you breathe out after you drink the liquid, the doctor can tell if the bacteria are present.    Upper endoscopy. This test is done to see inside the stomach and duodenum. This lets your provider check for ulcers. During the test, an endoscope (scope) is used. This is a thin, flexible tube with a tiny camera on the end. The scope is inserted through your mouth. It is then guided down into your stomach or duodenum. If needed, tiny tools may be passed through the scope to take tissue samples (biopsy). Treatment can also be done at the same time if bleeding or other problems are found.    Upper gastrointestinal (GI) series. This test is done to take X-rays of your upper digestive tract from your mouth to the small intestine. This lets your provider check for ulcers. For this test, you ll drink a milky liquid that has a substance called barium. The barium coats your upper digestive tract so that it will show up clearly on X-rays.  Treating a peptic ulcer  Medicines are the most common treatment for peptic ulcers. They include:    Antibiotics. These kill H. pylori bacteria. In many cases, you ll need to take at least 2 types of antibiotics.    Proton pump inhibitors. These block your stomach from making any acid.    H2 blockers. These reduce the amount of acid your stomach makes.    Bismuth subsalicylate. This helps protect the lining of your stomach and  duodenum from acid.  Be sure to take all of the medicines that you re prescribed exactly as instructed. Don t stop taking the medicines, even if you are feeling better. The medicines may cause side effects. Your healthcare provider will tell you more about these based on which medicines you are prescribed. During treatment, avoid taking aspirin and other NSAIDs. You may also be told to avoid cigarettes, alcohol, and caffeine. These may worsen your symptoms or affect how well your ulcer heals. If your stress level is high, reducing stress may help.  In severe cases, other treatments will likely be done. These may include procedures with an endoscope or surgery. Your provider will tell you more about these treatments, if needed.  When to call your healthcare provider  Call your provider right away if you have any of the following:    Fever of 100.4 F (38 C) or higher, or as advised by your provider    Frequent vomiting, blood in your vomit, or coffee ground-like substance in your vomit     Sudden or severe abdominal pain    Dark, tarry, or bloody stools    Weight loss    Pain that doesn't get better, even with treatment   Preventing a peptic ulcer  Take the following steps to help prevent a peptic ulcer:    Reduce your risk of H. pylori infection. Most experts think that H. pylori can be passed through food or water that is contaminated by an infected person. For your safety, the CDC recommends following basic hygiene. For instance, always wash your hands with warm water and soap after using the bathroom. Also wash your hands before preparing and handling food. And only drink water from safe sources.      Limit the use of aspirin and NSAIDs. Work with your provider to change your medicine or to reduce the amount of NSAIDs you take. If you must take aspirin or NSAIDs, ask your provider about other pain medicines you can take to help protect your stomach and duodenum. Sometimes other pain medicines can be used instead.  But if you take aspirin because of a heart condition, blood clot, or stroke, talk with your healthcare provider first before you stop taking aspirin.    Quit smoking. Quit other forms of tobacco too.  Date Last Reviewed: 7/1/2016 2000-2017 [employer]. [address] [place] [id number]. All rights reserved. This information is not intended as a substitute for professional medical care. Always follow your healthcare professional's instructions.

## 2018-09-16 NOTE — MR AVS SNAPSHOT
After Visit Summary   9/16/2018    Rosendo Dallas    MRN: [id number]           Patient Information     Date Of Birth          1980        Visit Information        Provider Department      9/16/2018 9:25 AM Serge Whittington PA-C [facility]        Today's Diagnoses     Abdominal pain, epigastric    -  1      Care Instructions      Peptic Ulcer     Peptic ulcers can occur in the stomach or duodenum.   A peptic ulcer is a sore in the lining of your stomach or in the first part of your small intestine (duodenum). It can cause belly (abdominal) pain and other symptoms. In some cases, a peptic ulcer may get worse. This can lead to serious problems such as bleeding or a hole (perforation) in the stomach or duodenum. Treatment is needed to prevent these complications. With treatment, most people fully recover.  Common causes of a peptic ulcer  A peptic ulcer can be caused by:    Infection from H. pylori (Helicobacter pylori) bacteria    Long-term use of pain medicines called non-steroidal anti-inflammatory drugs (NSAIDs), such as aspirin and ibuprofen    Physical stress such as burns, head injuries, needing a breathing tube, or being on blood- thinning medicine    Other rare causes  How a peptic ulcer forms  The lining of your stomach and duodenum is coated with a thick mucus layer. This helps protect the stomach and duodenum from the acids and enzymes they make to help break down the food you eat. When H. pylori is present, it can weaken the mucus layer and irritate the lining underneath. Acid may pass through the weakened mucus layer and cause an ulcer to form. Overuse of NSAIDs can also damage the mucus layer, leading to ulcers.  Symptoms of a peptic ulcer  Peptic ulcers may or may not cause symptoms. If you do have symptoms, they may come and go. They may also range from mild to severe. Common symptoms include:    Burning, cramping, or hunger-like pain in the  stomach area (often 1 to 3 hours after a meal, or in the middle of the night)    Pain that gets better or worse with eating    Loss of appetite    Weight loss    Nausea or vomiting (vomit may be bloody or look like coffee grounds)    Black, tarry, or bloody stools (this means the ulcer is bleeding)  Diagnosing a peptic ulcer  Your healthcare provider will ask about your symptoms and health history. You ll also have a physical exam. In addition, tests will be done to confirm the problem. These tests also help determine if an ulcer is caused by H. pylori. Tests can include:    Blood, stool, or breath tests. These are done to check for H. pylori and other problems. For blood and stool tests, small samples of your blood and stool are taken. The samples are sent to a lab for exam. For a breath test, you'll drink a liquid that contains a harmless compound. H pylori causes the compound to break down and release carbon dioxide gas. By testing the air you breathe out after you drink the liquid, the doctor can tell if the bacteria are present.    Upper endoscopy. This test is done to see inside the stomach and duodenum. This lets your provider check for ulcers. During the test, an endoscope (scope) is used. This is a thin, flexible tube with a tiny camera on the end. The scope is inserted through your mouth. It is then guided down into your stomach or duodenum. If needed, tiny tools may be passed through the scope to take tissue samples (biopsy). Treatment can also be done at the same time if bleeding or other problems are found.    Upper gastrointestinal (GI) series. This test is done to take X-rays of your upper digestive tract from your mouth to the small intestine. This lets your provider check for ulcers. For this test, you ll drink a milky liquid that has a substance called barium. The barium coats your upper digestive tract so that it will show up clearly on X-rays.  Treating a peptic ulcer  Medicines are the most  common treatment for peptic ulcers. They include:    Antibiotics. These kill H. pylori bacteria. In many cases, you ll need to take at least 2 types of antibiotics.    Proton pump inhibitors. These block your stomach from making any acid.    H2 blockers. These reduce the amount of acid your stomach makes.    Bismuth subsalicylate. This helps protect the lining of your stomach and duodenum from acid.  Be sure to take all of the medicines that you re prescribed exactly as instructed. Don t stop taking the medicines, even if you are feeling better. The medicines may cause side effects. Your healthcare provider will tell you more about these based on which medicines you are prescribed. During treatment, avoid taking aspirin and other NSAIDs. You may also be told to avoid cigarettes, alcohol, and caffeine. These may worsen your symptoms or affect how well your ulcer heals. If your stress level is high, reducing stress may help.  In severe cases, other treatments will likely be done. These may include procedures with an endoscope or surgery. Your provider will tell you more about these treatments, if needed.  When to call your healthcare provider  Call your provider right away if you have any of the following:    Fever of 100.4 F (38 C) or higher, or as advised by your provider    Frequent vomiting, blood in your vomit, or coffee ground-like substance in your vomit     Sudden or severe abdominal pain    Dark, tarry, or bloody stools    Weight loss    Pain that doesn't get better, even with treatment   Preventing a peptic ulcer  Take the following steps to help prevent a peptic ulcer:    Reduce your risk of H. pylori infection. Most experts think that H. pylori can be passed through food or water that is contaminated by an infected person. For your safety, the CDC recommends following basic hygiene. For instance, always wash your hands with warm water and soap after using the bathroom. Also wash your hands before preparing  and handling food. And only drink water from safe sources.      Limit the use of aspirin and NSAIDs. Work with your provider to change your medicine or to reduce the amount of NSAIDs you take. If you must take aspirin or NSAIDs, ask your provider about other pain medicines you can take to help protect your stomach and duodenum. Sometimes other pain medicines can be used instead. But if you take aspirin because of a heart condition, blood clot, or stroke, talk with your healthcare provider first before you stop taking aspirin.    Quit smoking. Quit other forms of tobacco too.  Date Last Reviewed: 7/1/2016 2000-2017 [employer]. [address] [place] [address]. All rights reserved. This information is not intended as a substitute for professional medical care. Always follow your healthcare professional's instructions.                Follow-ups after your visit        Who to contact     If you have questions or need follow up information about today's clinic visit or your schedule please contact [facility] directly at [phone].  Normal or non-critical lab and imaging results will be communicated to you by [employer], letter or phone within 4 business days after the clinic has received the results. If you do not hear from us within 7 days, please contact the clinic through [employer] or phone. If you have a critical or abnormal lab result, we will notify you by phone as soon as possible.  Submit refill requests through [employer] or call your pharmacy and they will forward the refill request to us. Please allow 3 business days for your refill to be completed.          Additional Information About Your Visit        [employer] Information     [employer] gives you secure access to your electronic health record. If you see a primary care provider, you can also send messages to your care team and make appointments. If you have questions, please call your primary care clinic.  If  you do not have a primary care provider, please call [phone] and they will assist you.        Care EveryWhere ID     This is your Care EveryWhere ID. This could be used by other organizations to access your [facility] medical records  [id number]        Your Vitals Were     Pulse Temperature Respirations BMI (Body Mass Index)          73 98.7  F (37.1  C) (Oral) 20 27.05 kg/m2         Blood Pressure from Last 3 Encounters:   09/16/18 110/76   07/16/18 128/86   07/13/18 122/84    Weight from Last 3 Encounters:   09/16/18 205 lb (93 kg)   07/16/18 212 lb 6.4 oz (96.3 kg)   07/13/18 209 lb (94.8 kg)              We Performed the Following     CBC with platelets     Comprehensive metabolic panel (BMP + Alb, Alk Phos, ALT, AST, Total. Bili, TP)          Today's Medication Changes          These changes are accurate as of 9/16/18 10:22 AM.  If you have any questions, ask your nurse or doctor.               Start taking these medicines.        Dose/Directions    lidocaine (viscous) 15 mL, alum & mag hydroxide-simethicone 15 mL GI Cocktail   Used for:  Abdominal pain, epigastric   Started by:  Serge Whittington PA-C        Dose:  30 mL   Take 30 mLs by mouth once for 1 dose   Refills:  0       sucralfate 1 GM tablet   Commonly known as:  CARAFATE   Used for:  Abdominal pain, epigastric   Started by:  Serge Whittington PA-C        Dose:  1 g   Take 1 tablet (1 g) by mouth 4 times daily for 14 days   Quantity:  56 tablet   Refills:  0            Where to get your medicines      These medications were sent to [facility] [address]  [place] [email or web address] [facility] OF  & BE [facility]  [address], [place] [address]     Phone:  [phone]     sucralfate 1 GM tablet         Some of these will need a paper prescription and others can be bought over the counter.  Ask your nurse if you have questions.     You don't need a prescription for these medications     lidocaine  (viscous) 15 mL, alum & mag hydroxide-simethicone 15 mL GI Cocktail                Primary Care Provider Office Phone # Fax #    [facility] [phone] [address] [facility] [address]        Equal Access to Services     FILI DELVALLE : Hadii aad ku hadasho Soomaali, waaxda luqadaha, qaybta kaalmada adeegyada, waxay idiin hayaan adeeg josue lamary faust. So [facility] [phone].    ATENCIÓN: Si habla español, tiene a crump disposición servicios gratuitos de asistencia lingüística. Llame al [phone].    We comply with applicable federal civil rights laws and [place] laws. We do not discriminate on the basis of race, color, national origin, age, disability, sex, sexual orientation, or gender identity.            Thank you!     Thank you for choosing [facility]  for your care. Our goal is always to provide you with excellent care. Hearing back from our patients is one way we can continue to improve our services. Please take a few minutes to complete the written survey that you may receive in the mail after your visit with us. Thank you!             Your Updated Medication List - Protect others around you: Learn how to safely use, store and throw away your medicines at www.disposemymeds.org.          This list is accurate as of 9/16/18 10:22 AM.  Always use your most recent med list.                   Brand Name Dispense Instructions for use Diagnosis    escitalopram 10 MG tablet    LEXAPRO    90 tablet    Take 1 tablet (10 mg) by mouth daily    Anxiety, Moderate single current episode of major depressive disorder (H)       lidocaine (viscous) 15 mL, alum & mag hydroxide-simethicone 15 mL GI Cocktail      Take 30 mLs by mouth once for 1 dose    Abdominal pain, epigastric       omeprazole 10 MG CR capsule    priLOSEC    60 capsule    Take by mouth 30-60 minutes before a meal.        sucralfate 1 GM tablet    CARAFATE    56 tablet    Take 1 tablet (1  g) by mouth 4 times daily for 14 days    Abdominal pain, epigastric

## 2018-09-26 ENCOUNTER — TELEPHONE (OUTPATIENT)
Dept: GASTROENTEROLOGY | Facility: CLINIC | Age: 38
End: 2018-09-26

## 2018-09-26 NOTE — TELEPHONE ENCOUNTER
PREVISIT INFORMATION                                                    Rosendo Dallas scheduled for future visit at [facility].    Patient is scheduled to see Dr Rivera on 10/16/18  Reason for visit: Abdominal pain/reflux symptoms   Referring provider   Has patient seen previous specialist? Yes. [facility]   Medical Records:  Available in chart.  Patient was previously seen at a [place] or [facility] facility.    REVIEW                                                      New patient packet mailed to patient: No  Medication reconciliation complete: No      Current Outpatient Prescriptions   Medication Sig Dispense Refill     escitalopram (LEXAPRO) 10 MG tablet Take 1 tablet (10 mg) by mouth daily 90 tablet 1     omeprazole (PRILOSEC) 10 MG capsule Take by mouth 30-60 minutes before a meal. 60 capsule      sucralfate (CARAFATE) 1 GM tablet Take 1 tablet (1 g) by mouth 4 times daily for 14 days 56 tablet 0       Allergies: Seasonal allergies        PLAN/FOLLOW-UP NEEDED                                                      Previsit review complete.  Patient will see provider at future scheduled appointment.     VM left for patient to call back if records for previous GI visits  are needed    Patient Reminders Given:  Please, make sure you bring an updated list of your medications.   If you are having a procedure, please, present 15 minutes early.  If you need to cancel or reschedule,please call [phone].    Vineet HOUSTON

## 2018-10-05 ENCOUNTER — TRANSFERRED RECORDS (OUTPATIENT)
Dept: HEALTH INFORMATION MANAGEMENT | Facility: CLINIC | Age: 38
End: 2018-10-05

## 2018-12-26 ENCOUNTER — OFFICE VISIT (OUTPATIENT)
Dept: FAMILY MEDICINE | Facility: CLINIC | Age: 38
End: 2018-12-26
Payer: COMMERCIAL

## 2018-12-26 VITALS
DIASTOLIC BLOOD PRESSURE: 90 MMHG | HEIGHT: 73 IN | TEMPERATURE: 98.5 F | BODY MASS INDEX: 28.49 KG/M2 | HEART RATE: 69 BPM | WEIGHT: 215 LBS | SYSTOLIC BLOOD PRESSURE: 138 MMHG | OXYGEN SATURATION: 98 % | RESPIRATION RATE: 16 BRPM

## 2018-12-26 DIAGNOSIS — B99.9 INFECTIOUS FOLLICULITIS: Primary | ICD-10-CM

## 2018-12-26 DIAGNOSIS — L73.8 INFECTIOUS FOLLICULITIS: Primary | ICD-10-CM

## 2018-12-26 PROCEDURE — 99213 OFFICE O/P EST LOW 20 MIN: CPT | Performed by: INTERNAL MEDICINE

## 2018-12-26 RX ORDER — DOXYCYCLINE 100 MG/1
100 CAPSULE ORAL 2 TIMES DAILY
Qty: 28 CAPSULE | Refills: 1 | Status: SHIPPED | OUTPATIENT
Start: 2018-12-26 | End: 2019-04-22

## 2018-12-26 RX ORDER — MUPIROCIN 20 MG/G
OINTMENT TOPICAL
Qty: 30 G | Refills: 5 | Status: SHIPPED | OUTPATIENT
Start: 2018-12-26 | End: 2019-04-22

## 2018-12-26 ASSESSMENT — PAIN SCALES - GENERAL: PAINLEVEL: MODERATE PAIN (5)

## 2018-12-26 ASSESSMENT — MIFFLIN-ST. JEOR: SCORE: 1949.11

## 2018-12-26 ASSESSMENT — PATIENT HEALTH QUESTIONNAIRE - PHQ9: SUM OF ALL RESPONSES TO PHQ QUESTIONS 1-9: 4

## 2018-12-26 NOTE — PATIENT INSTRUCTIONS
At [facility], we strive to deliver an exceptional experience to you, every time we see you.  If you receive a survey in the mail, please send us back your thoughts. We really do value your feedback.    Based on your medical history, these are the current health maintenance/preventive care services that you are due for (some may have been done at this visit.)  Health Maintenance Due   Topic Date Due     HIV SCREEN (SYSTEM ASSIGNED)  11/17/1998     DTAP/TDAP/TD IMMUNIZATION (7 - Td) 07/01/2006     PHQ-9 Q6 MONTHS  04/13/2018     INFLUENZA VACCINE (1) 09/01/2018         Suggested websites for health information:  Www.[employer].org : Up to date and easily searchable information on multiple topics.  Www.medlineplus.gov : medication info, interactive tutorials, watch real surgeries online  Www.familydoctor.org : good info from the Academy of Family Physicians  Www.cdc.gov : public health info, travel advisories, epidemics (H1N1)  Www.aap.org : children's health info, normal development, vaccinations  Www.health.[facility].[place].us : [place] dept of health, public health issues in [place], N1N1    Your care team:                            Family Medicine Internal Medicine   MD Enrique Robledo MD Shantel Branch-Fleming, MD Katya Georgiev PA-C Nam Ho, MD Pediatrics   JENA Ware, MD Dot Barger CNP, MD Deborah Mielke, MD Kim Thein, APRN [facility]      Clinic hours: Monday - Thursday 7 am-7 pm; Fridays 7 am-5 pm.   Urgent care: Monday - Friday 11 am-9 pm; Saturday and Sunday 9 am-5 pm.  Pharmacy : Monday -Thursday 8 am-8 pm; Friday 8 am-6 pm; Saturday and Sunday 9 am-5 pm.     Clinic: [phone]   Pharmacy: [phone]

## 2018-12-26 NOTE — PROGRESS NOTES
SUBJECTIVE:   Rosendo Dallas is a 38 year old male who presents to clinic today for the following health issues:    Skin evaluation of Scalp      Duration: 1 week     Description (location/character/radiation): red, painful  bumps on scalp at occipital area.    Intensity:  5/10    Accompanying signs and symptoms: itchy    History (similar episodes/previous evaluation): Seen at [facility] in [place].    Precipitating or alleviating factors: Not immunosuppressed. Had haircut prior to developing scalp lesions    Therapies tried and outcome: Augmentin (worsened condition). Ibuprofen (mildly relieved pain)       Problem list and histories reviewed & adjusted, as indicated.  Additional history: as documented    Patient Active Problem List   Diagnosis     Vitamin D deficiency     Muscle pain     Anxiety     Major depressive disorder, single episode, moderate (H)     Past Surgical History:   Procedure Laterality Date     COLONOSCOPY  3 years ago    No concerns     ORTHOPEDIC SURGERY  04/18    Mensicus / ACL surgery       Social History     Tobacco Use     Smoking status: Never Smoker     Smokeless tobacco: Never Used   Substance Use Topics     Alcohol use: Yes     Comment: 2 - 3 drinks a week     Family History   Problem Relation Age of Onset     Hypertension Mother      Hyperlipidemia Mother      Depression Other         dad's side     Diabetes No family hx of      Coronary Artery Disease No family hx of      Cerebrovascular Disease No family hx of      Breast Cancer No family hx of      Colon Cancer No family hx of      Prostate Cancer No family hx of      Thyroid Disease No family hx of          Allergies   Allergen Reactions     Seasonal Allergies Itching     Recent Labs   Lab Test 09/16/18  1015 10/23/17  1054 07/12/17  0819 06/01/17  1143 02/13/17  1148   LDL  --   --  125*  --   --    HDL  --   --  43  --   --    TRIG  --   --  79  --   --    ALT 37 33  --  30 53   CR 0.91 0.87  --  0.90 0.89  "  GFRESTIMATED >90 >90  --  >90 >90  Non African American GFR Calc     GFRESTBLACK >90 >90  --  >90 >90  African American GFR Calc     POTASSIUM 4.2 4.2  --  4.3 3.9   TSH  --  0.57  --   --  0.95      BP Readings from Last 3 Encounters:   12/26/18 138/90   09/16/18 110/76   07/16/18 128/86    Wt Readings from Last 3 Encounters:   12/26/18 97.5 kg (215 lb)   09/16/18 93 kg (205 lb)   07/16/18 96.3 kg (212 lb 6.4 oz)               ROS:  CONSTITUTIONAL: NEGATIVE for fever, chills, change in weight  INTEGUMENTARY/SKIN: As above.  EYES: NEGATIVE for vision changes or irritation  ENT/MOUTH: NEGATIVE for ear, mouth and throat problems  RESP: NEGATIVE for significant cough or SOB  CV: NEGATIVE for chest pain, palpitations or peripheral edema  GI: NEGATIVE for nausea, abdominal pain, heartburn, or change in bowel habits  : NEGATIVE for frequency, dysuria, or hematuria  MUSCULOSKELETAL: NEGATIVE for significant arthralgias or myalgia  NEURO: NEGATIVE for weakness, dizziness or paresthesias  ENDOCRINE: NEGATIVE for temperature intolerance, skin/hair changes  HEME: NEGATIVE for bleeding problems  PSYCHIATRIC: NEGATIVE for changes in mood or affect    OBJECTIVE:     /90 (BP Location: Left arm, Patient Position: Chair, Cuff Size: Adult Regular)   Pulse 69   Temp 98.5  F (36.9  C) (Oral)   Resp 16   Ht 1.854 m (6' 1\")   Wt 97.5 kg (215 lb)   SpO2 98%   BMI 28.37 kg/m    Body mass index is 28.37 kg/m .  GENERAL: healthy, alert and no distress  EYES: Eyes grossly normal to inspection  HENT: normal cephalic/atraumatic and oral mucous membranes moist  SKIN: Multiple scalp pustules noted on the occiput.  NEURO: Normal strength and tone, mentation intact and speech normal  PSYCH: mentation appears normal, affect normal/bright    Diagnostic Test Results:  none     ASSESSMENT/PLAN:   }    1. Infectious folliculitis  Comment: Suspect bacterial folliculitis with MRSA due to lack of response to Augmentin. Switch to " Doxycycline. Also apply Mupirocin ointment on excoriated lesions and use Betadine wash daily.  - doxycycline monohydrate (MONODOX) 100 MG capsule; Take 1 capsule (100 mg) by mouth 2 times daily for 14 days  Dispense: 28 capsule; Refill: 1  - mupirocin (BACTROBAN) 2 % external ointment; Apply once a day to affected areas.  Dispense: 30 g; Refill: 5    Follow-up visit if condition worsens.    Enrique Ortega MD  [facility]

## 2019-02-25 ENCOUNTER — ANCILLARY PROCEDURE (OUTPATIENT)
Dept: GENERAL RADIOLOGY | Facility: CLINIC | Age: 39
End: 2019-02-25
Attending: INTERNAL MEDICINE
Payer: COMMERCIAL

## 2019-02-25 ENCOUNTER — OFFICE VISIT (OUTPATIENT)
Dept: FAMILY MEDICINE | Facility: CLINIC | Age: 39
End: 2019-02-25
Payer: COMMERCIAL

## 2019-02-25 VITALS
RESPIRATION RATE: 16 BRPM | TEMPERATURE: 98.6 F | DIASTOLIC BLOOD PRESSURE: 88 MMHG | SYSTOLIC BLOOD PRESSURE: 126 MMHG | HEART RATE: 94 BPM | OXYGEN SATURATION: 99 % | WEIGHT: 208 LBS | HEIGHT: 73 IN | BODY MASS INDEX: 27.57 KG/M2

## 2019-02-25 DIAGNOSIS — R05.8 COUGH PRESENT FOR GREATER THAN 3 WEEKS: ICD-10-CM

## 2019-02-25 DIAGNOSIS — J45.990 BRONCHOSPASM, EXERCISE-INDUCED: Primary | ICD-10-CM

## 2019-02-25 DIAGNOSIS — R07.89 CHEST TIGHTNESS: ICD-10-CM

## 2019-02-25 LAB
FEF 25/75: NORMAL
FEV-1: NORMAL
FEV1/FVC: NORMAL
FVC: NORMAL

## 2019-02-25 PROCEDURE — 94010 BREATHING CAPACITY TEST: CPT | Performed by: INTERNAL MEDICINE

## 2019-02-25 PROCEDURE — 99214 OFFICE O/P EST MOD 30 MIN: CPT | Mod: 25 | Performed by: INTERNAL MEDICINE

## 2019-02-25 PROCEDURE — 71046 X-RAY EXAM CHEST 2 VIEWS: CPT

## 2019-02-25 PROCEDURE — 93000 ELECTROCARDIOGRAM COMPLETE: CPT | Performed by: INTERNAL MEDICINE

## 2019-02-25 RX ORDER — ALBUTEROL SULFATE 90 UG/1
1-2 AEROSOL, METERED RESPIRATORY (INHALATION) EVERY 4 HOURS PRN
Qty: 1 INHALER | Refills: 11 | Status: SHIPPED | OUTPATIENT
Start: 2019-02-25 | End: 2020-03-19

## 2019-02-25 ASSESSMENT — MIFFLIN-ST. JEOR: SCORE: 1917.36

## 2019-02-25 ASSESSMENT — PAIN SCALES - GENERAL: PAINLEVEL: MODERATE PAIN (5)

## 2019-02-25 NOTE — PATIENT INSTRUCTIONS
At [facility], we strive to deliver an exceptional experience to you, every time we see you.  If you receive a survey in the mail, please send us back your thoughts. We really do value your feedback.    Based on your medical history, these are the current health maintenance/preventive care services that you are due for (some may have been done at this visit.)  Health Maintenance Due   Topic Date Due     PREVENTIVE CARE VISIT  1980     HIV SCREEN (SYSTEM ASSIGNED)  11/17/1998     INFLUENZA VACCINE (1) 09/01/2018         Suggested websites for health information:  Www.[employer] : Up to date and easily searchable information on multiple topics.  Www.medlineplus.gov : medication info, interactive tutorials, watch real surgeries online  Www.familydoctor.org : good info from the Academy of Family Physicians  Www.cdc.gov : public health info, travel advisories, epidemics (H1N1)  Www.aap.org : children's health info, normal development, vaccinations  Www.health.[facility].[place].us : [place] dept of health, public health issues in [place], N1N1    Your care team:                            Family Medicine Internal Medicine   MD Enrique Robledo MD Shantel Branch-Fleming, MD Katya Georgiev PA-C Nam Ho, MD Pediatrics   JENA Ware, MD Dot Barger CNP, MD Deborah Mielke, MD Kim Thein, APRN CNP      Clinic hours: Monday - Thursday 7 am-7 pm; Fridays 7 am-5 pm.   Urgent care: Monday - Friday 11 am-9 pm; Saturday and Sunday 9 am-5 pm.  Pharmacy : Monday -Thursday 8 am-8 pm; Friday 8 am-6 pm; Saturday and Sunday 9 am-5 pm.     Clinic: [phone]   Pharmacy: [phone]

## 2019-04-10 ENCOUNTER — MYC MEDICAL ADVICE (OUTPATIENT)
Dept: NEUROLOGY | Facility: CLINIC | Age: 39
End: 2019-04-10

## 2019-04-11 ENCOUNTER — OFFICE VISIT (OUTPATIENT)
Dept: OPHTHALMOLOGY | Facility: CLINIC | Age: 39
End: 2019-04-11
Attending: NURSE PRACTITIONER
Payer: COMMERCIAL

## 2019-04-11 ENCOUNTER — ANCILLARY PROCEDURE (OUTPATIENT)
Dept: MRI IMAGING | Facility: CLINIC | Age: 39
End: 2019-04-11
Attending: NURSE PRACTITIONER
Payer: COMMERCIAL

## 2019-04-11 DIAGNOSIS — R51.9 HEADACHE BEHIND THE EYES: Primary | ICD-10-CM

## 2019-04-11 DIAGNOSIS — E23.6 EMPTY SELLA (H): ICD-10-CM

## 2019-04-11 DIAGNOSIS — R51.9 WORSENING HEADACHES: Primary | ICD-10-CM

## 2019-04-11 DIAGNOSIS — R51.9 WORSENING HEADACHES: ICD-10-CM

## 2019-04-11 RX ORDER — GADOBUTROL 604.72 MG/ML
10 INJECTION INTRAVENOUS ONCE
Status: COMPLETED | OUTPATIENT
Start: 2019-04-11 | End: 2019-04-11

## 2019-04-11 RX ADMIN — GADOBUTROL 9 ML: 604.72 INJECTION INTRAVENOUS at 16:27

## 2019-04-11 ASSESSMENT — VISUAL ACUITY
CORRECTION_TYPE: CONTACTS
OD_CC: 20/20
METHOD: SNELLEN - LINEAR
OS_CC: 20/20

## 2019-04-11 ASSESSMENT — CONF VISUAL FIELD
METHOD: COUNTING FINGERS
OD_NORMAL: 1
OS_NORMAL: 1

## 2019-04-11 ASSESSMENT — TONOMETRY
IOP_METHOD: ICARE
OS_IOP_MMHG: 16
OD_IOP_MMHG: 16

## 2019-04-11 ASSESSMENT — CUP TO DISC RATIO
OS_RATIO: 0.20
OD_RATIO: 0.20

## 2019-04-11 ASSESSMENT — SLIT LAMP EXAM - LIDS
COMMENTS: NORMAL
COMMENTS: NORMAL

## 2019-04-11 NOTE — NURSING NOTE
Chief Complaints and History of Present Illnesses   Patient presents with     Eye Exam For Headaches     Chief Complaint(s) and History of Present Illness(es)     Eye Exam For Headaches     Laterality: both eyes    Associated symptoms: eye pain and headaches (takes ibuprofen for some relief.)              Comments     Patient notes that he had an eye exam about 6 months ago, has been having pain behind BE, LE>RE, with HA x 2 weeks, Neurology referred for IOP check just in case.  Patient notes VA is good, denies flashing or floaters, or current eye pain.  Notes pain comes and goes, describes as an ache and pressure feeling behind eyes. CTL wearer, soft.     Aurea Perez April 11, 2019 2:01 PM

## 2019-04-11 NOTE — PROGRESS NOTES
A/P  1.) Headache behind the eyes  -Empty sella syndrome with new onset headache x 2 weeks located behind the eyes  -Possible blurred margin on optic disc left eye. Pt does not recall any previous optic nerve abnormalities, last eye exam 6 months  -No other ocular symptoms at this time  -RNFL OCT normal today, no significant thickening    Pt getting MRI later today. Will refer to neuro-ophth to r/o optic nerve swelling. Reviewed to monitor for vision changes and call if they occur    I have confirmed the patient's CC, HPI and reviewed Past Medical History, Past Surgical History, Social History, Family History, Problem List, Medication List and agree with Tech note.     Paula Sosa, OD [facility]

## 2019-04-11 NOTE — TELEPHONE ENCOUNTER
"Spoke with pt about his worsening symptoms.     Pt reports that headache symptoms worsened starting 2 weeks ago. He reports feeling a \"brain cloud\", increasing pressure in back of head, jaw and eye pain. Neck stiffness that increases with chin to chest maneuver. Symptoms improve when supine. Increased sound sensitivity and generalized weakness. Left ear pain which he describes as closer to a headache type pain versus inner ear.     Denies fever. No vision changes, N/T or focalized weakness.     Symptoms discussed with Lorena Lmeos with the following recommendations:    Appointment scheduled for follow up tomorrow am  MRI ordered   Ophthalmology referral placed  Go to ER with new or worsening symptoms    Pt aware of these recommendations. Message sent to schedulers to see if MRI and Ophthalmology referral can be done today.   "

## 2019-04-12 ENCOUNTER — OFFICE VISIT (OUTPATIENT)
Dept: NEUROLOGY | Facility: CLINIC | Age: 39
End: 2019-04-12
Payer: COMMERCIAL

## 2019-04-12 VITALS
RESPIRATION RATE: 16 BRPM | HEART RATE: 95 BPM | HEIGHT: 73 IN | SYSTOLIC BLOOD PRESSURE: 146 MMHG | TEMPERATURE: 97.9 F | WEIGHT: 212.2 LBS | BODY MASS INDEX: 28.12 KG/M2 | OXYGEN SATURATION: 97 % | DIASTOLIC BLOOD PRESSURE: 100 MMHG

## 2019-04-12 DIAGNOSIS — R51.9 ACUTE INTRACTABLE HEADACHE, UNSPECIFIED HEADACHE TYPE: Primary | ICD-10-CM

## 2019-04-12 RX ORDER — AMITRIPTYLINE HYDROCHLORIDE 10 MG/1
10 TABLET ORAL AT BEDTIME
Qty: 30 TABLET | Refills: 3 | Status: SHIPPED | OUTPATIENT
Start: 2019-04-12 | End: 2019-09-30

## 2019-04-12 ASSESSMENT — ENCOUNTER SYMPTOMS
COUGH: 1
EYE PAIN: 1
HEADACHES: 1
SPUTUM PRODUCTION: 1

## 2019-04-12 ASSESSMENT — MIFFLIN-ST. JEOR: SCORE: 1936.28

## 2019-04-12 ASSESSMENT — PAIN SCALES - GENERAL: PAINLEVEL: NO PAIN (0)

## 2019-04-12 NOTE — PATIENT INSTRUCTIONS
Plan:  Headache prevention-amitriptyline 10 mg at bedtime  Acute headache treatment -may try excedrin OTC for acute headache as needed or can consider migraine specific acute medications but need you blood pressure better control  Stop ibuprofen  Hydration  Your blood pressure is elevated today-See your primary care provider for blood pressure recheck and control.   Call if headache persists

## 2019-04-12 NOTE — PROGRESS NOTES
"Re: Rosendo Dallas      MRN# [id number]  YOB: 1980  Date of Visit:4/12/2019    OUTPATIENT NEUROLOGY VISIT NOTE    Reason for Visit:  Headache follow up    Interval History:  Rosendo Dallas is a 38-year-old male presents to the clinic today for headache and ED follow up  Patient was initially seen in our Clinic on 7/16/2018 for headache evaluation and asymptomatic empty sella. Preventive headache treatment was discussed with the patient at his initial visit.   Today patient presents to our Clinic with worsening headache symptoms. Patient went to the [facility] ED on 4/11/2019 for worsening headache and acute headache treatment.   ED provider's note reviewed and negative ED evaluation. Patient received acute headache treatment with IV fluids, ketorolac and metoclopramide.   ED treatment:  04/11/2019 1049 diphenhydrAMINE 50 mg injection (BENADRYL) 50 mg Intravenous Given   04/11/2019 1048 ketorolac 15 mg injection (TORADOL) 15 mg Intravenous Given   04/11/2019 1052 metoclopramide 10 mg injection (REGLAN) 10 mg Intravenous Given   04/11/2019 1047 NaCl 0.9% 1,000 mL 1,000 mL Intravenous New Bag   04/11/2019 1119 NaCl 0.9% 1,000 mL 0 mL Intravenous Stopped      Since July of 2018 -at least once per week and about 3-4 hours. Typically releived with ibuprofen, hydration and rest.   Patient reports that 2 weeks ago his headache has started with neck stiffness and headache in the back of his head and sensitivity to noise and discomfort in the jaws-mostly left. Patient reports that he felt \"brain fog.\" Headache has started before his vacation to [place] and throughout his vacation. Patient reports that headache felt better at times. Patient reports that ibuprofen has not help as much.   Patient reports that work changes before headache onset.   Patient reports that he has not been sleeping well.   Mother has headaches  Patient reports that headache has settled behind his left eye and about 3/10 currently. ED treatment " helped.   No visual changes.   Brain MRI reviewed and no acute findings  Patient was on Lexapro for anxiety and stopped about a year ago.   EKG on 2/25/2019 and no acute findings and normal   Recommended headache preventive treatment for worsening headaches that required ED acute headache treatment. Discussed preventive treatment options with the patient and a trial of amitriptyline seems most appropriate to help with headaches and possibly improve sleeping. Patient has history of asthma so BBB are not preferred.   Patient has elevated blood pressure today and recommended to follow up with PCP. If no cardio or other concerns and acute headache does not respond to excedrin as need-may consider a trial of sumatriptan or rizatriptan for acute headache treatment as needed.   Recommended to monitor headaches for frequency and severity and return to our clinic in 6 weeks after starting amitriptyline.     Plan discussed with the patient:  Headache prevention-amitriptyline 10 mg at bedtime  Acute headache treatment -may try excedrin OTC for acute headache as needed or can consider migraine specific acute medications but need you blood pressure better control  Stop ibuprofen  Hydration  Your blood pressure is elevated today-See your primary care provider for blood pressure recheck and control.   Call if headache persists  Follow up in 6 weeks or sooner if needed    Past Medical History reviewed   Seasonal allergies and asthma  Past Medical History:   Diagnosis Date     Head injury 15 + years ago    Likely had 1 to 2 concussions in HS and college athletics     IBS (irritable colon syndrome)        Social History     Tobacco Use     Smoking status: Never Smoker     Smokeless tobacco: Never Used   Substance Use Topics     Alcohol use: Yes     Comment: 2 - 3 drinks a week    reviewed and verified with the patient     Allergies   Allergen Reactions     Seasonal Allergies Itching       Current Outpatient Medications  "  Medication Sig Dispense Refill     albuterol (PROAIR HFA) 108 (90 Base) MCG/ACT inhaler Inhale 1-2 puffs into the lungs every 4 hours as needed for shortness of breath / dyspnea or wheezing 1 Inhaler 11     omeprazole (PRILOSEC) 10 MG capsule Take by mouth 30-60 minutes before a meal. 60 capsule      escitalopram (LEXAPRO) 10 MG tablet Take 1 tablet (10 mg) by mouth daily (Patient not taking: Reported on 4/12/2019) 90 tablet 1     mupirocin (BACTROBAN) 2 % external ointment Apply once a day to affected areas. (Patient not taking: Reported on 4/12/2019) 30 g 5   reviewed and verified with the patient    Review of Systems:   A 10-point ROS including constitutional, eyes, respiratory, cardiovascular, gastroenterology, genitourinary, integumentary, musculoskeletal, neurology and psychiatric were all negative except as mentioned in the interval history.      General Exam:   BP (!) 146/100 (BP Location: Left arm, Patient Position: Sitting, Cuff Size: Adult Large)   Pulse 95   Temp 97.9  F (36.6  C) (Oral)   Resp 16   Ht 1.854 m (6' 0.99\")   Wt 96.3 kg (212 lb 3.2 oz)   SpO2 97%   BMI 28.00 kg/m     /96  GEN: Awake, NAD; good eye contact, responses appropriately   HEENT: Head atraumatic/Normocephalic. Scalp normal. Pupils equally round, 4 mm, reactive to light and accommodation, sclera and conjunctiva normal. Fundoscopic examination reveals normal vessels no papilledema.   Neck: Easily moveable without resistance  Heart: S1/S2 appreciated, RRR, no m/r/g, no carotid bruits  Lungs:Lungs are clear to auscultation bilaterally, no wheezes or crackles.   Neurological Examination:  The patient is alert and oriented times four. Has good attention and concentration. Speech is fluent without dysarthria. EOM intact. There is no nystagmus. Has conjugated gaze. Face is symmetrical. Intact and symmetrical eyebrow and lid raise and eyelid closure, smiles. Hearing Intact to conversation speech. The palates elevates " symmetrical. The tongue protrudes midline with no atrophy or fasciculations. Strength  5/5 in the upper and lower extremities bilaterally. Sensation is intact to  touch throughout.  Reflexes symmetrical at biceps, triceps, brachioradialis, patellar, and Achilles. Negative Babinski with downgoing toes bilaterally. Coordination reveals finger-nose-finger, rapid alternating movements with normal speed and accuracy. Normal casual gait.    Assessment and Plan:  See Interval History for our discussion and plan    Prescription for amitriptyline  provided. Correct use and course provided. Expected benefits and typical side effects reviewed. Patient is in agreement with the trial.     I discussed all my recommendation with Rosendo Dallas. The patient verbalizes understanding and comfortable with the plan. The patient has our clinic phone number to call with any questions or concerns. All of the patient's questions were answered from the best of my current knowledge.     Time spent with pt answering questions, discussing findings, counseling and coordinating care was more than 50% the appointment time, 26  minutes.         BINTA Beebe, CNP  [facility]    Answers for HPI/ROS submitted by the patient on 4/12/2019   General Symptoms: No  Skin Symptoms: No  HENT Symptoms: No  EYE SYMPTOMS: Yes  HEART SYMPTOMS: No  LUNG SYMPTOMS: Yes  INTESTINAL SYMPTOMS: No  URINARY SYMPTOMS: No  REPRODUCTIVE SYMPTOMS: No  SKELETAL SYMPTOMS: No  BLOOD SYMPTOMS: No  NERVOUS SYSTEM SYMPTOMS: Yes  MENTAL HEALTH SYMPTOMS: No  Eye pain: Yes  Cough: Yes  Sputum or phlegm: Yes  Headache: Yes

## 2019-04-12 NOTE — LETTER
"4/12/2019       RE: Rosendo Dallas  [address]  [facility] [address]     Dear Colleague,    Thank you for referring your patient, Rosendo Dallas, to the [facility] NEUROLOG[facility] at [facility]. Please see a copy of my visit note below.    Re: Rosendo Dallas      MRN# [id number]  YOB: 1980  Date of Visit:4/12/2019    OUTPATIENT NEUROLOGY VISIT NOTE    Reason for Visit:  Headache follow up    Interval History:  Rosendo Dallas is a 38-year-old male presents to the clinic today for headache and ED follow up  Patient was initially seen in our Clinic on 7/16/2018 for headache evaluation and asymptomatic empty sella. Preventive headache treatment was discussed with the patient at his initial visit.   Today patient presents to our Clinic with worsening headache symptoms. Patient went to the [facility] ED on 4/11/2019 for worsening headache and acute headache treatment.   ED provider's note reviewed and negative ED evaluation. Patient received acute headache treatment with IV fluids, ketorolac and metoclopramide.   ED treatment:  04/11/2019 1049 diphenhydrAMINE 50 mg injection (BENADRYL) 50 mg Intravenous Given   04/11/2019 1048 ketorolac 15 mg injection (TORADOL) 15 mg Intravenous Given   04/11/2019 1052 metoclopramide 10 mg injection (REGLAN) 10 mg Intravenous Given   04/11/2019 1047 NaCl 0.9% 1,000 mL 1,000 mL Intravenous New Bag   04/11/2019 1119 NaCl 0.9% 1,000 mL 0 mL Intravenous Stopped      Since July of 2018 -at least once per week and about 3-4 hours. Typically releived with ibuprofen, hydration and rest.   Patient reports that 2 weeks ago his headache has started with neck stiffness and headache in the back of his head and sensitivity to noise and discomfort in the jaws-mostly left. Patient reports that he felt \"brain fog.\" Headache has started before his vacation to [place] and throughout his vacation. Patient reports that headache felt better at times. Patient reports " that ibuprofen has not help as much.   Patient reports that work changes before headache onset.   Patient reports that he has not been sleeping well.   Mother has headaches  Patient reports that headache has settled behind his left eye and about 3/10 currently. ED treatment helped.   No visual changes.   Brain MRI reviewed and no acute findings  Patient was on Lexapro for anxiety and stopped about a year ago.   EKG on 2/25/2019 and no acute findings and normal   Recommended headache preventive treatment for worsening headaches that required ED acute headache treatment. Discussed preventive treatment options with the patient and a trial of amitriptyline seems most appropriate to help with headaches and possibly improve sleeping. Patient has history of asthma so BBB are not preferred.   Patient has elevated blood pressure today and recommended to follow up with PCP. If no cardio or other concerns and acute headache does not respond to excedrin as need-may consider a trial of sumatriptan or rizatriptan for acute headache treatment as needed.   Recommended to monitor headaches for frequency and severity and return to our clinic in 6 weeks after starting amitriptyline.     Plan discussed with the patient:  Headache prevention-amitriptyline 10 mg at bedtime  Acute headache treatment -may try excedrin OTC for acute headache as needed or can consider migraine specific acute medications but need you blood pressure better control  Stop ibuprofen  Hydration  Your blood pressure is elevated today-See your primary care provider for blood pressure recheck and control.   Call if headache persists  Follow up in 6 weeks or sooner if needed    Past Medical History reviewed   Seasonal allergies and asthma  Past Medical History:   Diagnosis Date     Head injury 15 + years ago    Likely had 1 to 2 concussions in HS and college athletics     IBS (irritable colon syndrome)        Social History     Tobacco Use     Smoking status:  "Never Smoker     Smokeless tobacco: Never Used   Substance Use Topics     Alcohol use: Yes     Comment: 2 - 3 drinks a week    reviewed and verified with the patient     Allergies   Allergen Reactions     Seasonal Allergies Itching       Current Outpatient Medications   Medication Sig Dispense Refill     albuterol (PROAIR HFA) 108 (90 Base) MCG/ACT inhaler Inhale 1-2 puffs into the lungs every 4 hours as needed for shortness of breath / dyspnea or wheezing 1 Inhaler 11     omeprazole (PRILOSEC) 10 MG capsule Take by mouth 30-60 minutes before a meal. 60 capsule      escitalopram (LEXAPRO) 10 MG tablet Take 1 tablet (10 mg) by mouth daily (Patient not taking: Reported on 4/12/2019) 90 tablet 1     mupirocin (BACTROBAN) 2 % external ointment Apply once a day to affected areas. (Patient not taking: Reported on 4/12/2019) 30 g 5   reviewed and verified with the patient    Review of Systems:   A 10-point ROS including constitutional, eyes, respiratory, cardiovascular, gastroenterology, genitourinary, integumentary, musculoskeletal, neurology and psychiatric were all negative except as mentioned in the interval history.      General Exam:   BP (!) 146/100 (BP Location: Left arm, Patient Position: Sitting, Cuff Size: Adult Large)   Pulse 95   Temp 97.9  F (36.6  C) (Oral)   Resp 16   Ht 1.854 m (6' 0.99\")   Wt 96.3 kg (212 lb 3.2 oz)   SpO2 97%   BMI 28.00 kg/m      /96  GEN: Awake, NAD; good eye contact, responses appropriately   HEENT: Head atraumatic/Normocephalic. Scalp normal. Pupils equally round, 4 mm, reactive to light and accommodation, sclera and conjunctiva normal. Fundoscopic examination reveals normal vessels no papilledema.   Neck: Easily moveable without resistance  Heart: S1/S2 appreciated, RRR, no m/r/g, no carotid bruits  Lungs:Lungs are clear to auscultation bilaterally, no wheezes or crackles.   Neurological Examination:  The patient is alert and oriented times four. Has good attention and " concentration. Speech is fluent without dysarthria. EOM intact. There is no nystagmus. Has conjugated gaze. Face is symmetrical. Intact and symmetrical eyebrow and lid raise and eyelid closure, smiles. Hearing Intact to conversation speech. The palates elevates symmetrical. The tongue protrudes midline with no atrophy or fasciculations. Strength  5/5 in the upper and lower extremities bilaterally. Sensation is intact to  touch throughout.  Reflexes symmetrical at biceps, triceps, brachioradialis, patellar, and Achilles. Negative Babinski with downgoing toes bilaterally. Coordination reveals finger-nose-finger, rapid alternating movements with normal speed and accuracy. Normal casual gait.    Assessment and Plan:  See Interval History for our discussion and plan    Prescription for amitriptyline  provided. Correct use and course provided. Expected benefits and typical side effects reviewed. Patient is in agreement with the trial.     I discussed all my recommendation with Rosendo Dallas. The patient verbalizes understanding and comfortable with the plan. The patient has our clinic phone number to call with any questions or concerns. All of the patient's questions were answered from the best of my current knowledge.     Time spent with pt answering questions, discussing findings, counseling and coordinating care was more than 50% the appointment time, 26  minutes.     BINTA Beebe, CNP  [facility]

## 2019-04-22 ENCOUNTER — OFFICE VISIT (OUTPATIENT)
Dept: FAMILY MEDICINE | Facility: CLINIC | Age: 39
End: 2019-04-22
Payer: COMMERCIAL

## 2019-04-22 VITALS
TEMPERATURE: 98.5 F | DIASTOLIC BLOOD PRESSURE: 87 MMHG | BODY MASS INDEX: 28.04 KG/M2 | HEIGHT: 72 IN | OXYGEN SATURATION: 97 % | WEIGHT: 207 LBS | HEART RATE: 82 BPM | SYSTOLIC BLOOD PRESSURE: 135 MMHG | RESPIRATION RATE: 16 BRPM

## 2019-04-22 DIAGNOSIS — R07.89 CHEST TIGHTNESS: Primary | ICD-10-CM

## 2019-04-22 DIAGNOSIS — J45.990 BRONCHOSPASM, EXERCISE-INDUCED: ICD-10-CM

## 2019-04-22 DIAGNOSIS — J30.2 SEASONAL ALLERGIC RHINITIS, UNSPECIFIED TRIGGER: ICD-10-CM

## 2019-04-22 PROCEDURE — 99214 OFFICE O/P EST MOD 30 MIN: CPT | Performed by: FAMILY MEDICINE

## 2019-04-22 RX ORDER — FLUTICASONE PROPIONATE 50 MCG
1 SPRAY, SUSPENSION (ML) NASAL DAILY
Qty: 16 G | Refills: 11 | Status: SHIPPED | OUTPATIENT
Start: 2019-04-22 | End: 2019-09-30

## 2019-04-22 ASSESSMENT — MIFFLIN-ST. JEOR: SCORE: 1900.92

## 2019-04-22 ASSESSMENT — ANXIETY QUESTIONNAIRES
IF YOU CHECKED OFF ANY PROBLEMS ON THIS QUESTIONNAIRE, HOW DIFFICULT HAVE THESE PROBLEMS MADE IT FOR YOU TO DO YOUR WORK, TAKE CARE OF THINGS AT HOME, OR GET ALONG WITH OTHER PEOPLE: SOMEWHAT DIFFICULT
7. FEELING AFRAID AS IF SOMETHING AWFUL MIGHT HAPPEN: NOT AT ALL
6. BECOMING EASILY ANNOYED OR IRRITABLE: SEVERAL DAYS
GAD7 TOTAL SCORE: 4
1. FEELING NERVOUS, ANXIOUS, OR ON EDGE: SEVERAL DAYS
3. WORRYING TOO MUCH ABOUT DIFFERENT THINGS: SEVERAL DAYS
2. NOT BEING ABLE TO STOP OR CONTROL WORRYING: SEVERAL DAYS
5. BEING SO RESTLESS THAT IT IS HARD TO SIT STILL: NOT AT ALL

## 2019-04-22 ASSESSMENT — PAIN SCALES - GENERAL: PAINLEVEL: NO PAIN (0)

## 2019-04-22 ASSESSMENT — PATIENT HEALTH QUESTIONNAIRE - PHQ9
5. POOR APPETITE OR OVEREATING: NOT AT ALL
SUM OF ALL RESPONSES TO PHQ QUESTIONS 1-9: 3

## 2019-04-22 NOTE — PATIENT INSTRUCTIONS
Start using Flonase nasal spray for allergies daily.    If you are still having chest symptoms on a regular basis let me know and I will send in a prescription for a preventative inhaler.     Please call [facility] (formerly called [facility]) at [phone] to schedule your stress ECHO.     The goal is under 2000 mg sodium daily.     A recommended diet for hypertension is the DASH diet: Dietary Approaches to Stop Hypertension.     Patient Education

## 2019-04-22 NOTE — LETTER
My Depression Action Plan  Name: Rosendo Dallas   Date of Birth 1980  Date: 4/22/2019    My doctor: [facility]   My clinic: [address] [address] [phone]          GREEN    ZONE   Good Control    What it looks like:     Things are going generally well. You have normal up s and down s. You may even feel depressed from time to time, but bad moods usually last less than a day.   What you need to do:  1. Continue to care for yourself (see self care plan)  2. Check your depression survival kit and update it as needed  3. Follow your physician s recommendations including any medication.  4. Do not stop taking medication unless you consult with your physician first.           YELLOW         ZONE Getting Worse    What it looks like:     Depression is starting to interfere with your life.     It may be hard to get out of bed; you may be starting to isolate yourself from others.    Symptoms of depression are starting to last most all day and this has happened for several days.     You may have suicidal thoughts but they are not constant.   What you need to do:     1. Call your care team, your response to treatment will improve if you keep your care team informed of your progress. Yellow periods are signs an adjustment may need to be made.     2. Continue your self-care, even if you have to fake it!    3. Talk to someone in your support network    4. Open up your depression survival kit           RED    ZONE Medical Alert - Get Help    What it looks like:     Depression is seriously interfering with your life.     You may experience these or other symptoms: You can t get out of bed most days, can t work or engage in other necessary activities, you have trouble taking care of basic hygiene, or basic responsibilities, thoughts of suicide or death that will not go away, self-injurious behavior.     What you need to do:  1. Call your  care team and request a same-day appointment. If they are not available (weekends or after hours) call your local crisis line, emergency room or 911.            Depression Self Care Plan / Survival Kit    Self-Care for Depression  Here s the deal. Your body and mind are really not as separate as most people think.  What you do and think affects how you feel and how you feel influences what you do and think. This means if you do things that people who feel good do, it will help you feel better.  Sometimes this is all it takes.  There is also a place for medication and therapy depending on how severe your depression is, so be sure to consult with your medical provider and/ or Behavioral Health Consultant if your symptoms are worsening or not improving.     In order to better manage my stress, I will:    Exercise  Get some form of exercise, every day. This will help reduce pain and release endorphins, the  feel good  chemicals in your brain. This is almost as good as taking antidepressants!  This is not the same as joining a gym and then never going! (they count on that by the way ) It can be as simple as just going for a walk or doing some gardening, anything that will get you moving.      Hygiene   Maintain good hygiene (Get out of bed in the morning, Make your bed, Brush your teeth, Take a shower, and Get dressed like you were going to work, even if you are unemployed).  If your clothes don't fit try to get ones that do.    Diet  I will strive to eat foods that are good for me, drink plenty of water, and avoid excessive sugar, caffeine, alcohol, and other mood-altering substances.  Some foods that are helpful in depression are: complex carbohydrates, B vitamins, flaxseed, fish or fish oil, fresh fruits and vegetables.    Psychotherapy  I agree to participate in Individual Therapy (if recommended).    Medication  If prescribed medications, I agree to take them.  Missing doses can result in serious side effects.  I  understand that drinking alcohol, or other illicit drug use, may cause potential side effects.  I will not stop my medication abruptly without first discussing it with my provider.    Staying Connected With Others  I will stay in touch with my friends, family members, and my primary care provider/team.    Use your imagination  Be creative.  We all have a creative side; it doesn t matter if it s oil painting, sand castles, or mud pies! This will also kick up the endorphins.    Witness Beauty  (AKA stop and smell the roses) Take a look outside, even in mid-winter. Notice colors, textures. Watch the squirrels and birds.     Service to others  Be of service to others.  There is always someone else in need.  By helping others we can  get out of ourselves  and remember the really important things.  This also provides opportunities for practicing all the other parts of the program.    Humor  Laugh and be silly!  Adjust your TV habits for less news and crime-drama and more comedy.    Control your stress  Try breathing deep, massage therapy, biofeedback, and meditation. Find time to relax each day.     My support system    Clinic Contact:  Phone number:    Contact 1:  Phone number:    Contact 2:  Phone number:    Quaker/:  Phone number:    Therapist:  Phone number:    Local crisis center:    Phone number:    Other community support:  Phone number:

## 2019-04-22 NOTE — PROGRESS NOTES
SUBJECTIVE:   Rosendo Dallas is a 38 year old male who presents to clinic today for the following   health issues:    Concern - Elevated blood pressure  Onset: 3 months    Description:   Elevated blood pressures      Accompanying Signs & Symptoms:  Also started having asthma symptoms around the same time    Previous history of similar problem:   no    Precipitating factors:   Worsened by: possibly from asthma    Alleviating factors:  Improved by: n/a    Therapies Tried and outcome: none      Patient was seeing neurology for headaches and had his third or fourth elevated BP reading so they recommended he follow-up with primary care to address this. He thinks his BP might be higher when he is in pain.    He also complains of more chest tightness and needing to clear his throat, and occasional SOB after workouts onset 2-3 months ago. This is new for him. He has been using an albuterol inhaler 1-2 times per day. It is helping with exercise but at rest he still feels symptoms. He does have seasonal allergies and his typical symptoms are sneezing, scratchy throat and watery eyes. He has not started taking an antihistamine yet this season.     Patient complains of occasional exertional chest pain. When he is about a mile into a run he has noticed some chest tightness, but it has not been severe enough to make him stop running. He denies anxiety or panic attacks.     Additional history: as documented    Reviewed  and updated as needed this visit by clinical staff  Tobacco  Allergies  Meds  Med Hx  Surg Hx  Fam Hx  Soc Hx        Reviewed and updated as needed this visit by Provider         Patient Active Problem List   Diagnosis     Vitamin D deficiency     Muscle pain     Anxiety     Major depressive disorder, single episode, moderate (H)     Past Surgical History:   Procedure Laterality Date     COLONOSCOPY  3 years ago    No concerns     ORTHOPEDIC SURGERY  04/18    Mensicus / ACL surgery       Social History  "    Tobacco Use     Smoking status: Never Smoker     Smokeless tobacco: Never Used   Substance Use Topics     Alcohol use: Yes     Comment: 2 - 3 drinks a week     Family History   Problem Relation Age of Onset     Hypertension Mother      Hyperlipidemia Mother      Depression Other         dad's side     Heart Disease Maternal Grandfather      Diabetes No family hx of      Coronary Artery Disease No family hx of      Cerebrovascular Disease No family hx of      Breast Cancer No family hx of      Colon Cancer No family hx of      Prostate Cancer No family hx of      Thyroid Disease No family hx of      Glaucoma No family hx of      Macular Degeneration No family hx of            ROS:  Constitutional, HEENT, cardiovascular, pulmonary, gi and gu systems are negative, except as otherwise noted.    This document serves as a record of the services and decisions personally performed by KAYLAN MILIAN. It was created on his/her behalf by Nick Wilson, a trained medical scribe. The creation of this document is based on the provider's statements to the medical scribe. Nick Wilson, April 22, 2019 2:01 PM  OBJECTIVE:     /87 (BP Location: Right arm, Patient Position: Chair, Cuff Size: Adult Large)   Pulse 82   Temp 98.5  F (36.9  C) (Oral)   Resp 16   Ht 1.835 m (6' 0.25\")   Wt 93.9 kg (207 lb)   SpO2 97%   BMI 27.88 kg/m    Body mass index is 27.88 kg/m .  GENERAL: healthy, alert and no distress  HENT: right nasal mucosal edema, pharyngeal erythema. ear canals and TM's normal, nose and mouth without ulcers or lesions  NECK: no adenopathy, no asymmetry, masses, or scars and thyroid normal to palpation  RESP: lungs clear to auscultation - no rales, rhonchi or wheezes  CV: regular rate and rhythm, normal S1 S2, no S3 or S4, no murmur, click or rub, no peripheral edema and peripheral pulses strong  ABDOMEN: soft, nontender, no hepatosplenomegaly, no masses and bowel sounds normal  MS: no gross " musculoskeletal defects noted, no edema  PSYCH: mentation appears normal, affect normal/bright    2/25/19 EKG:   Impression     Normal sinus rhythm       ASSESSMENT/PLAN:     1. Chest tightness  Possibly due to asthma but need to r/o cardiac  - Echocardiogram Exercise Stress; Future    2. Bronchospasm, exercise-induced  3. Seasonal allergic rhinitis, unspecified trigger  If symptoms are not improving with Flonase, start daily preventative inhaler (he will call)   Continue goal of alb prior to exercise but for sx control <2 x's per week.    - fluticasone (FLONASE) 50 MCG/ACT nasal spray; Spray 1 spray into both nostrils daily  Dispense: 16 g; Refill: 11    4. Elevated bp without dx of HTN- bp is ok today. Continue to monitor. ? Pain is trigger for htn noted in past.     F/u 1 mo for recheck and prn. Encouraged follow-up with consistent provider- has seen severla different providers in our system    Patient Instructions   Start using Flonase nasal spray for allergies daily.    If you are still having chest symptoms on a regular basis let me know and I will send in a prescription for a preventative inhaler.     Please call [facility] (formerly called [facility]) at [phone] to schedule your stress ECHO.     The goal is under 2000 mg sodium daily.     A recommended diet for hypertension is the DASH diet: Dietary Approaches to Stop Hypertension.     Patient Education         The information in this document, created by the medical scribe for me, accurately reflects the services I personally performed and the decisions made by me. I have reviewed and approved this document for accuracy.   Kim Arambula MD  [facility]

## 2019-04-23 ASSESSMENT — ANXIETY QUESTIONNAIRES: GAD7 TOTAL SCORE: 4

## 2019-04-23 ASSESSMENT — ASTHMA QUESTIONNAIRES: ACT_TOTALSCORE: 13

## 2019-04-30 ENCOUNTER — ANCILLARY PROCEDURE (OUTPATIENT)
Dept: CARDIOLOGY | Facility: CLINIC | Age: 39
End: 2019-04-30
Attending: FAMILY MEDICINE
Payer: COMMERCIAL

## 2019-04-30 DIAGNOSIS — R07.89 CHEST TIGHTNESS: ICD-10-CM

## 2019-04-30 PROCEDURE — 93325 DOPPLER ECHO COLOR FLOW MAPG: CPT | Performed by: INTERNAL MEDICINE

## 2019-04-30 PROCEDURE — 93016 CV STRESS TEST SUPVJ ONLY: CPT | Performed by: INTERNAL MEDICINE

## 2019-04-30 PROCEDURE — 93350 STRESS TTE ONLY: CPT | Performed by: INTERNAL MEDICINE

## 2019-04-30 PROCEDURE — 93017 CV STRESS TEST TRACING ONLY: CPT | Performed by: INTERNAL MEDICINE

## 2019-04-30 PROCEDURE — 93352 ADMIN ECG CONTRAST AGENT: CPT | Performed by: INTERNAL MEDICINE

## 2019-04-30 PROCEDURE — 93018 CV STRESS TEST I&R ONLY: CPT | Performed by: INTERNAL MEDICINE

## 2019-04-30 PROCEDURE — 93321 DOPPLER ECHO F-UP/LMTD STD: CPT | Performed by: INTERNAL MEDICINE

## 2019-04-30 RX ADMIN — Medication 5 ML: at 09:30

## 2019-07-14 ENCOUNTER — APPOINTMENT (OUTPATIENT)
Dept: ULTRASOUND IMAGING | Facility: CLINIC | Age: 39
End: 2019-07-14
Attending: EMERGENCY MEDICINE
Payer: COMMERCIAL

## 2019-07-14 ENCOUNTER — HOSPITAL ENCOUNTER (EMERGENCY)
Facility: CLINIC | Age: 39
Discharge: HOME | End: 2019-07-14
Attending: EMERGENCY MEDICINE | Admitting: EMERGENCY MEDICINE
Payer: COMMERCIAL

## 2019-07-14 VITALS
SYSTOLIC BLOOD PRESSURE: 128 MMHG | BODY MASS INDEX: 26.51 KG/M2 | HEIGHT: 73 IN | HEART RATE: 84 BPM | DIASTOLIC BLOOD PRESSURE: 97 MMHG | OXYGEN SATURATION: 98 % | WEIGHT: 200 LBS | RESPIRATION RATE: 16 BRPM | TEMPERATURE: 98.7 F

## 2019-07-14 DIAGNOSIS — K21.9 GASTROESOPHAGEAL REFLUX DISEASE, ESOPHAGITIS PRESENCE NOT SPECIFIED: ICD-10-CM

## 2019-07-14 DIAGNOSIS — R10.13 ABDOMINAL PAIN, EPIGASTRIC: ICD-10-CM

## 2019-07-14 LAB
ALBUMIN SERPL-MCNC: 4.2 G/DL (ref 3.4–5)
ALP SERPL-CCNC: 78 U/L (ref 40–150)
ALT SERPL W P-5'-P-CCNC: 45 U/L (ref 0–70)
ANION GAP SERPL CALCULATED.3IONS-SCNC: 9 MMOL/L (ref 3–14)
AST SERPL W P-5'-P-CCNC: 37 U/L (ref 0–45)
BASOPHILS # BLD AUTO: 0 10E9/L (ref 0–0.2)
BASOPHILS NFR BLD AUTO: 0.2 %
BILIRUB SERPL-MCNC: 1.2 MG/DL (ref 0.2–1.3)
BUN SERPL-MCNC: 14 MG/DL (ref 7–30)
CALCIUM SERPL-MCNC: 9.1 MG/DL (ref 8.5–10.1)
CHLORIDE SERPL-SCNC: 104 MMOL/L (ref 94–109)
CO2 SERPL-SCNC: 26 MMOL/L (ref 20–32)
CREAT SERPL-MCNC: 0.83 MG/DL (ref 0.66–1.25)
DIFFERENTIAL METHOD BLD: ABNORMAL
EOSINOPHIL # BLD AUTO: 0.1 10E9/L (ref 0–0.7)
EOSINOPHIL NFR BLD AUTO: 1.8 %
ERYTHROCYTE [DISTWIDTH] IN BLOOD BY AUTOMATED COUNT: 12 % (ref 10–15)
GFR SERPL CREATININE-BSD FRML MDRD: >90 ML/MIN/{1.73_M2}
GLUCOSE SERPL-MCNC: 104 MG/DL (ref 70–99)
HCT VFR BLD AUTO: 44.6 % (ref 40–53)
HGB BLD-MCNC: 16.4 G/DL (ref 13.3–17.7)
IMM GRANULOCYTES # BLD: 0 10E9/L (ref 0–0.4)
IMM GRANULOCYTES NFR BLD: 0.2 %
INTERPRETATION ECG - MUSE: NORMAL
LIPASE SERPL-CCNC: 81 U/L (ref 73–393)
LYMPHOCYTES # BLD AUTO: 1.4 10E9/L (ref 0.8–5.3)
LYMPHOCYTES NFR BLD AUTO: 26.7 %
MCH RBC QN AUTO: 31.4 PG (ref 26.5–33)
MCHC RBC AUTO-ENTMCNC: 36.8 G/DL (ref 31.5–36.5)
MCV RBC AUTO: 85 FL (ref 78–100)
MONOCYTES # BLD AUTO: 0.3 10E9/L (ref 0–1.3)
MONOCYTES NFR BLD AUTO: 5.5 %
NEUTROPHILS # BLD AUTO: 3.3 10E9/L (ref 1.6–8.3)
NEUTROPHILS NFR BLD AUTO: 65.6 %
NRBC # BLD AUTO: 0 10*3/UL
NRBC BLD AUTO-RTO: 0 /100
PLATELET # BLD AUTO: 198 10E9/L (ref 150–450)
POTASSIUM SERPL-SCNC: 3.9 MMOL/L (ref 3.4–5.3)
PROT SERPL-MCNC: 7.6 G/DL (ref 6.8–8.8)
RBC # BLD AUTO: 5.22 10E12/L (ref 4.4–5.9)
SODIUM SERPL-SCNC: 139 MMOL/L (ref 133–144)
WBC # BLD AUTO: 5.1 10E9/L (ref 4–11)

## 2019-07-14 PROCEDURE — 85025 COMPLETE CBC W/AUTO DIFF WBC: CPT | Performed by: EMERGENCY MEDICINE

## 2019-07-14 PROCEDURE — 96375 TX/PRO/DX INJ NEW DRUG ADDON: CPT

## 2019-07-14 PROCEDURE — 76705 ECHO EXAM OF ABDOMEN: CPT

## 2019-07-14 PROCEDURE — 25000128 H RX IP 250 OP 636: Performed by: EMERGENCY MEDICINE

## 2019-07-14 PROCEDURE — 96374 THER/PROPH/DIAG INJ IV PUSH: CPT

## 2019-07-14 PROCEDURE — 99285 EMERGENCY DEPT VISIT HI MDM: CPT | Mod: 25

## 2019-07-14 PROCEDURE — 80053 COMPREHEN METABOLIC PANEL: CPT | Performed by: EMERGENCY MEDICINE

## 2019-07-14 PROCEDURE — 25000125 ZZHC RX 250: Performed by: EMERGENCY MEDICINE

## 2019-07-14 PROCEDURE — 93005 ELECTROCARDIOGRAM TRACING: CPT

## 2019-07-14 PROCEDURE — 25000132 ZZH RX MED GY IP 250 OP 250 PS 637: Performed by: EMERGENCY MEDICINE

## 2019-07-14 PROCEDURE — 83690 ASSAY OF LIPASE: CPT | Performed by: EMERGENCY MEDICINE

## 2019-07-14 RX ORDER — ONDANSETRON 2 MG/ML
4 INJECTION INTRAMUSCULAR; INTRAVENOUS EVERY 30 MIN PRN
Status: DISCONTINUED | OUTPATIENT
Start: 2019-07-14 | End: 2019-07-14 | Stop reason: HOSPADM

## 2019-07-14 RX ORDER — ONDANSETRON 4 MG/1
4 TABLET, ORALLY DISINTEGRATING ORAL EVERY 8 HOURS PRN
Qty: 10 TABLET | Refills: 0 | Status: SHIPPED | OUTPATIENT
Start: 2019-07-14 | End: 2019-09-30

## 2019-07-14 RX ADMIN — LIDOCAINE HYDROCHLORIDE 30 ML: 20 SOLUTION ORAL; TOPICAL at 13:31

## 2019-07-14 RX ADMIN — FAMOTIDINE 20 MG: 10 INJECTION, SOLUTION INTRAVENOUS at 13:31

## 2019-07-14 RX ADMIN — ONDANSETRON 4 MG: 2 INJECTION INTRAMUSCULAR; INTRAVENOUS at 14:24

## 2019-07-14 ASSESSMENT — MIFFLIN-ST. JEOR: SCORE: 1881.07

## 2019-07-14 NOTE — DISCHARGE INSTRUCTIONS
Call [facility] to schedule endoscopy  Continue omeprazole 20 mg BID  Can use zofran for nausea  Can try maalox or tums as needed if symptoms more severe

## 2019-07-14 NOTE — ED PROVIDER NOTES
"  History     Chief Complaint:  Abdominal Pain    HPI   Rosendo Dallas is a 38 year old male with history of acid reflux who presents with abdominal pain. The patient reports that he has been taking Omeprazole 2x a day from 1x day for the last 1.5 weeks, since he feels his acid reflux has been worsening.  Today, he reports that today feels like prior acid reflux episodes, but worse in that he now has nausea and abdominal pain, prompting his presentation. He reports that he had one episode of vomiting last night; bland foods are tolerable, but has trouble with all others for the last 1.5 weeks. He denies any fever, urinary symptoms, trouble breathing, or history of heart problems, but endorses chills, diaphoresis, and feeling of constipation, but is still having bowel movements. No chest pain.    Allergies:  No known drug allergies     Medications:    Albuterol  Amitriptyline  Flonase  Prilosec    Past Medical History:    Head injury  IBS    Past Surgical History:    Orthopedic surgery    Family History:    Hypertension  Hyperlipidemia    Social History:  Smoking status: Never smoker  Alcohol use: Yes  Drug use: No  PCP: [facility]  Presents to the ED with himself  Marital Status:        Review of Systems  + abdominal pain, nausea, vomiting  -fever, urinary symptoms, chest pain  10 point review of systems was obtained and negative other than mentioned above.    Physical Exam     Patient Vitals for the past 24 hrs:   BP Temp Temp src Pulse Resp SpO2 Height Weight   07/14/19 1310 (!) 161/101 98.7  F (37.1  C) Oral 89 16 99 % 1.854 m (6' 1\") 90.7 kg (200 lb)       Physical Exam  General: Resting comfortably on the gurney  Eyes:  The pupils are equal and round    Conjunctivae and sclerae are normal  ENT:    Moist mucous membranes  Neck:  Normal range of motion  CV:  Regular rate and rhythm    Skin warm and well perfused   Resp:  Lungs are clear    Non-labored    No rales    No wheezing   GI:  Abdomen " is soft, there is no rigidity    No distension    No rebound tenderness     Mild RUQ and epigastric abdominal tenderness  MS:  Normal muscular tone  Skin:  No rash or acute skin lesions noted  Neuro:   Awake, alert.      Speech is normal and fluent.    Face is symmetric.     Moves all extremities equally  Psych: Normal affect.  Appropriate interactions.      Emergency Department Course   ECG (13:37:33):  Rate 82 bpm. OR interval 182. QRS duration 90. QT/QTc 372/434. P-R-T axes 32 26 2. Normal sinus rhythm. Normal EKG. Agree with computer interpretation. Interpreted at 1342 by Tami Houser MD.    Imaging:  Radiographic findings were communicated with the patient who voiced understanding of the findings.    Abdomen US, Limited (RUQ only)  Unremarkable right upper quadrant ultrasound.   As read by Radiology.    Laboratory:  CBC: WNL (WBC 5.1, HGB 16.4, )  CMP: Glucose 104 (H) o/w WNL (Creatinine 0.83)  Lipase: 81    Interventions:  1331: GI Cocktail - Maalox 15 mL, Viscous Lidocaine 15 mL, 30 mL suspension PO  1331: 20 mg Pepcid IV  1424: 4 mg Zofran IV    Emergency Department Course:  Past medical records, nursing notes, and vitals reviewed.  1313: I performed an exam of the patient and obtained history, as documented above.    EKG obtained, findings above.    IV inserted and blood drawn.    The patient was sent for an abdomen ultrasound while in the emergency department, findings above.    1453: I rechecked the patient. He feels better and his nausea is diminished. Findings and plan explained to the patient. Patient discharged home with instructions regarding supportive care, medications, and reasons to return. The importance of close follow-up was reviewed.     Impression & Plan    Medical Decision Making:  Rosendo Dallas is a 38 year old male who presented to the ED with abdominal pain. Mild epigastric abdominal pain and symptoms consistent with his hx of acid reflux though now symptoms worse. Doubt  ACS, PE, dissection, AAA, bowel obstruction, appendicitis, diverticulitis, etc. Labs unremarkable. US gallbladder unremarkable. EKG with sinus rhythm and appears similar to prior EKG. Nausea and abdominal pain resolved in ED after medications. On repeat exam, has no abdominal tenderness. Don't think other imaging such as CT abdomen is indicated. Recommended continuing omeprazole, modify diet, limit alcohol and f/u with GI. At last GI visit in fall 2018, endoscopy was mentioned and I do think that this is next step. Reasons to return to ED discussed with patient.    Diagnosis:    ICD-10-CM   1. Abdominal pain, epigastric R10.13   2. Gastroesophageal reflux disease, esophagitis presence not specified K21.9       Disposition: Discharged to home    Discharge Medications:  Medication List   Started    ondansetron 4 MG ODT tab  Commonly known as:  ZOFRAN ODT  4 mg, Oral, EVERY 8 HOURS PRN       Eugenie ROSARIO, am serving as a scribe at 1:13 PM on 7/14/2019 to document services personally performed by Tami Houser MD based on my observations and the provider's statements to me.     Eugenie Saini  7/14/2019    EMERGENCY DEPARTMENT       Tami Houser MD  07/14/19 2650

## 2019-07-14 NOTE — ED AVS SNAPSHOT
Emergency Department  [address] [place] [id number]  Phone:  [phone]  Fax:  [phone]                                    Rosendo Dallas   MRN: [id number]    Department:   Emergency Department   Date of Visit:  7/14/2019           After Visit Summary Signature Page    I have received my discharge instructions, and my questions have been answered. I have discussed any challenges I see with this plan with the nurse or doctor.    ..........................................................................................................................................  Patient/Patient Representative Signature      ..........................................................................................................................................  Patient Representative Print Name and Relationship to Patient    ..................................................               ................................................  Date                                   Time    ..........................................................................................................................................  Reviewed by Signature/Title    ...................................................              ..............................................  Date                                               Time          22EPIC Rev 08/18

## 2019-07-16 ENCOUNTER — TRANSFERRED RECORDS (OUTPATIENT)
Dept: HEALTH INFORMATION MANAGEMENT | Facility: CLINIC | Age: 39
End: 2019-07-16

## 2019-07-17 ENCOUNTER — TRANSFERRED RECORDS (OUTPATIENT)
Dept: HEALTH INFORMATION MANAGEMENT | Facility: CLINIC | Age: 39
End: 2019-07-17

## 2019-09-12 ENCOUNTER — TRANSFERRED RECORDS (OUTPATIENT)
Dept: HEALTH INFORMATION MANAGEMENT | Facility: CLINIC | Age: 39
End: 2019-09-12

## 2019-09-20 ENCOUNTER — TRANSFERRED RECORDS (OUTPATIENT)
Dept: HEALTH INFORMATION MANAGEMENT | Facility: CLINIC | Age: 39
End: 2019-09-20

## 2019-09-30 ENCOUNTER — OFFICE VISIT (OUTPATIENT)
Dept: FAMILY MEDICINE | Facility: CLINIC | Age: 39
End: 2019-09-30
Payer: COMMERCIAL

## 2019-09-30 VITALS
RESPIRATION RATE: 18 BRPM | DIASTOLIC BLOOD PRESSURE: 68 MMHG | SYSTOLIC BLOOD PRESSURE: 108 MMHG | TEMPERATURE: 97.3 F | HEART RATE: 85 BPM | BODY MASS INDEX: 28.02 KG/M2 | HEIGHT: 72 IN | OXYGEN SATURATION: 96 % | WEIGHT: 206.9 LBS

## 2019-09-30 DIAGNOSIS — Z23 NEED FOR PROPHYLACTIC VACCINATION AND INOCULATION AGAINST INFLUENZA: ICD-10-CM

## 2019-09-30 DIAGNOSIS — R35.0 URINARY FREQUENCY: ICD-10-CM

## 2019-09-30 DIAGNOSIS — Z11.4 SCREENING FOR HUMAN IMMUNODEFICIENCY VIRUS: ICD-10-CM

## 2019-09-30 DIAGNOSIS — R30.0 DYSURIA: ICD-10-CM

## 2019-09-30 DIAGNOSIS — L29.0 PRURITUS ANI: ICD-10-CM

## 2019-09-30 DIAGNOSIS — K64.4 EXTERNAL HEMORRHOIDS: Primary | ICD-10-CM

## 2019-09-30 DIAGNOSIS — J45.20 MILD INTERMITTENT ASTHMA WITHOUT COMPLICATION: ICD-10-CM

## 2019-09-30 PROBLEM — M79.10 MUSCLE PAIN: Status: RESOLVED | Noted: 2017-02-17 | Resolved: 2019-09-30

## 2019-09-30 LAB
ALBUMIN UR-MCNC: NEGATIVE MG/DL
APPEARANCE UR: CLEAR
BILIRUB UR QL STRIP: NEGATIVE
COLOR UR AUTO: YELLOW
GLUCOSE UR STRIP-MCNC: NEGATIVE MG/DL
HBA1C MFR BLD: 5.2 % (ref 0–5.6)
HGB UR QL STRIP: NEGATIVE
KETONES UR STRIP-MCNC: NEGATIVE MG/DL
LEUKOCYTE ESTERASE UR QL STRIP: NEGATIVE
NITRATE UR QL: NEGATIVE
PH UR STRIP: 7 PH (ref 5–7)
RBC #/AREA URNS AUTO: NORMAL /HPF
SOURCE: NORMAL
SP GR UR STRIP: 1.01 (ref 1–1.03)
UROBILINOGEN UR STRIP-ACNC: 0.2 EU/DL (ref 0.2–1)
WBC #/AREA URNS AUTO: NORMAL /HPF

## 2019-09-30 PROCEDURE — 36415 COLL VENOUS BLD VENIPUNCTURE: CPT | Performed by: FAMILY MEDICINE

## 2019-09-30 PROCEDURE — 90686 IIV4 VACC NO PRSV 0.5 ML IM: CPT | Performed by: FAMILY MEDICINE

## 2019-09-30 PROCEDURE — 87491 CHLMYD TRACH DNA AMP PROBE: CPT | Performed by: FAMILY MEDICINE

## 2019-09-30 PROCEDURE — 87389 HIV-1 AG W/HIV-1&-2 AB AG IA: CPT | Performed by: FAMILY MEDICINE

## 2019-09-30 PROCEDURE — 99214 OFFICE O/P EST MOD 30 MIN: CPT | Mod: 25 | Performed by: FAMILY MEDICINE

## 2019-09-30 PROCEDURE — 87591 N.GONORRHOEAE DNA AMP PROB: CPT | Performed by: FAMILY MEDICINE

## 2019-09-30 PROCEDURE — 90471 IMMUNIZATION ADMIN: CPT | Performed by: FAMILY MEDICINE

## 2019-09-30 PROCEDURE — 81001 URINALYSIS AUTO W/SCOPE: CPT | Performed by: FAMILY MEDICINE

## 2019-09-30 PROCEDURE — 83036 HEMOGLOBIN GLYCOSYLATED A1C: CPT | Performed by: FAMILY MEDICINE

## 2019-09-30 RX ORDER — MENTHOL AND ZINC OXIDE .44; 20.625 G/100G; G/100G
OINTMENT TOPICAL 4 TIMES DAILY PRN
Qty: 1 TUBE | Refills: 0 | Status: SHIPPED | OUTPATIENT
Start: 2019-09-30 | End: 2020-05-18

## 2019-09-30 ASSESSMENT — ANXIETY QUESTIONNAIRES
2. NOT BEING ABLE TO STOP OR CONTROL WORRYING: NOT AT ALL
1. FEELING NERVOUS, ANXIOUS, OR ON EDGE: SEVERAL DAYS
GAD7 TOTAL SCORE: 5
7. FEELING AFRAID AS IF SOMETHING AWFUL MIGHT HAPPEN: NOT AT ALL
4. TROUBLE RELAXING: SEVERAL DAYS
GAD7 TOTAL SCORE: 5
7. FEELING AFRAID AS IF SOMETHING AWFUL MIGHT HAPPEN: NOT AT ALL
3. WORRYING TOO MUCH ABOUT DIFFERENT THINGS: SEVERAL DAYS
6. BECOMING EASILY ANNOYED OR IRRITABLE: SEVERAL DAYS
5. BEING SO RESTLESS THAT IT IS HARD TO SIT STILL: SEVERAL DAYS
GAD7 TOTAL SCORE: 5

## 2019-09-30 ASSESSMENT — MIFFLIN-ST. JEOR: SCORE: 1896.49

## 2019-09-30 ASSESSMENT — PATIENT HEALTH QUESTIONNAIRE - PHQ9
SUM OF ALL RESPONSES TO PHQ QUESTIONS 1-9: 2
SUM OF ALL RESPONSES TO PHQ QUESTIONS 1-9: 2
10. IF YOU CHECKED OFF ANY PROBLEMS, HOW DIFFICULT HAVE THESE PROBLEMS MADE IT FOR YOU TO DO YOUR WORK, TAKE CARE OF THINGS AT HOME, OR GET ALONG WITH OTHER PEOPLE: SOMEWHAT DIFFICULT

## 2019-09-30 NOTE — PATIENT INSTRUCTIONS
Important Takeaway Points From This Visit:    Your symptoms should improve over the next week.    You can continue to use hydrocortisone over for 2 more days.     I have given you a prescription for calmoseptine to use for itching and pain. You can apply this ointment up to 4 times daily.    I recommend you buy a cheap pair of boxer shorts or briefs as the Calmoseptine can stain clothes.    We will call with your lab results when available.     See us again if not improving over the next week.      As always, please call with any questions or concerns. I look forward to seeing you again soon!    Take care,  Dr. Gordon    Your current medication list is printed. Please keep this with you - it is helpful to bring this current list to any other medical appointments. It can also be helpful if you ever go to the emergency room or hospital.    If you had lab testing today we will call you with the results. The phone number we will call with your results is # [phone] (home) . If this is not the best number please call our clinic and change the number.    If you need any refills, please call your pharmacy and they will contact us.    If you have any further concerns or wish to schedule another appointment, please call our office at [phone].    If you have a medical emergency, please call [phone].    Thank you for coming to [facility]!

## 2019-09-30 NOTE — PROGRESS NOTES
"Subjective     Rosendo Dallas is a 38 year old male who presents to clinic today for the following health issues:    HPI   Hemorrhoids  Onset: almost 2 weeks    Description:   Pain: YES  Itching: YES    Accompanying Signs & Symptoms:  Blood streaked toilet paper: no   Blood in stool: no   Changes in stool pattern: no     History:   Any previous GI studies done:Colonoscopy done 6 years ago. Results were reported as normal.  Family History of colon cancer: no   Personal or family history of Crohn's disease or ulcerative colitis: No  Personal or family history of celiac disease: No    Precipitating factors:   None noted      Therapies Tried and outcome: Hydrocortisone cream and baths.     Symptoms began approximately 2 weeks ago that began with itching in his anal area.  Then proceeded to a \"bump\" that was painful mainly with sitting.  It is since been improving and decreasing in size according to the patient.  He denies any fever, chills, purulent discharge, hematochezia, or similar symptoms in the past.  He has tried hydrcotisone for the last 5 days.  Additionally he has been trying to eat a higher fiber diet with improvement in consistency of stools (softer more regular).  He is only having a little pain now, mostly itching now. No personal history history of crohns or ulcerative colitis.  Warm baths seemed to help the most.  He has not been aggressively since scurbbing area.  He denies any recent soap changes, detergent changes etc.    Additionally he has noticed an increase in frequency over the last last 4-5 days.  He is also had occasional associated dysuria.  He notes his urine is clear and has not noticed any lesions or purulent drainage.  He denies any difficulty starting stream and feels relieved after urination.  He has never noticed this in the past.  He is sexually active with one partner and does not have significant concerns about having STDs.  He is willing to be screened for HIV today.  He has no " history of recent hospitalization or catheter use.  No history of previous UTIs.  No history of previous kidney stones.    He is willing to receive his flu shot today.    Mild intermittent asthma without complication follow-up:   He has not had any recent hospitalizations for his symptoms.  Is mostly present in the wintertime with cold air.  His ACT score today is 25 is not been entered in the chart by my staff.  Rarely needs to use his albuterol has not required any steroid use.  He denies cough, shortness of breath, recent URIs, chest pain, or wheezing.      Patient Active Problem List   Diagnosis     Vitamin D deficiency     Anxiety     Major depressive disorder, single episode, moderate (H)     Gilbert syndrome     IBS (irritable bowel syndrome)     Past Surgical History:   Procedure Laterality Date     COLONOSCOPY  3 years ago    No concerns     ORTHOPEDIC SURGERY  04/18    Mensicus / ACL surgery       Social History     Tobacco Use     Smoking status: Never Smoker     Smokeless tobacco: Never Used   Substance Use Topics     Alcohol use: Yes     Comment: 2 - 3 drinks a week     Family History   Problem Relation Age of Onset     Hypertension Mother      Hyperlipidemia Mother      Depression Other         dad's side     Heart Disease Maternal Grandfather      Diabetes No family hx of      Coronary Artery Disease No family hx of      Cerebrovascular Disease No family hx of      Breast Cancer No family hx of      Colon Cancer No family hx of      Prostate Cancer No family hx of      Thyroid Disease No family hx of      Glaucoma No family hx of      Macular Degeneration No family hx of          Current Outpatient Medications   Medication Sig Dispense Refill     menthol-zinc oxide (CALMOSEPTINE) 0.44-20.625 % OINT ointment Apply topically 4 times daily as needed for skin protection or irritation 1 Tube 0     omeprazole (PRILOSEC) 10 MG capsule Take by mouth 30-60 minutes before a meal. 60 capsule      albuterol  (PROAIR HFA) 108 (90 Base) MCG/ACT inhaler Inhale 1-2 puffs into the lungs every 4 hours as needed for shortness of breath / dyspnea or wheezing (Patient not taking: Reported on 9/30/2019) 1 Inhaler 11     Allergies   Allergen Reactions     Seasonal Allergies Itching     Reviewed and updated as needed this visit by Provider  Tobacco  Allergies  Meds  Problems  Med Hx  Surg Hx  Fam Hx         Review of Systems   ROS COMP: Constitutional, HEENT, cardiovascular, pulmonary, derm, gi and gu systems are negative, except as otherwise noted.      Objective    /68   Pulse 85   Temp 97.3  F (36.3  C) (Temporal)   Resp 18   Ht 1.829 m (6')   Wt 93.8 kg (206 lb 14.4 oz)   SpO2 96%   BMI 28.06 kg/m    Body mass index is 28.06 kg/m .  Physical Exam   General: Appears well and in no acute distress.  Cardiovascular: Regular rate and rhythm, normal S1 and S2 without murmur. No extra heartsounds or friction rub. Radial pulses present and equal bilaterally.  Respiratory: Lungs clear to auscultation bilaterally. No wheezing or crackles. No prolonged expiration. Symmetrical chest rise.  GI/Rectal: Approximately 1 cm external hemorrhoid noted at the 7-8 o'clock position.  Surrounded lightly pink skin from the coccyx to the perineum surrounding the anus.. Soft, non-tender abdomen. No hepatosplenomegaly.  -Male: Penis without lesions. No urethral discharge. No palpable testicular nodules. No hernia   Musculoskeletal: No gross extremity deformities. No peripheral edema. Normal muscle bulk.     Diagnostic Test Results:    UA, A1c, HIV screen, A1c test pending.    Assessment & Plan     1. External hemorrhoids: Appearance this is the likely cause of his pain that has been improving over time.  He can continue to use the over-the-counter hydrocortisone topical ointment for 2 more days but no longer than 1 full week.  Additionally discussed continued high fiber diet containing 20 to 30 g daily.  If necessary may use  over-the-counter supplements of psyllium such as Metamucil.  Also discussed continued increase in water intake.  Patient does have history of IBS which is likely contributory.  Having itching is the most bothersome symptom at this time I have prescribed calmoseptine topically for his pruritus ani.  I did discuss that this is a oftentimes pink and sticky substance and to purchase a cheap pair of boxer shorts or a briefs to prevent staining his clothing.  - menthol-zinc oxide (CALMOSEPTINE) 0.44-20.625 % OINT ointment; Apply topically 4 times daily as needed for skin protection or irritation  Dispense: 1 Tube; Refill: 0    2. Pruritus ani: See above.  - menthol-zinc oxide (CALMOSEPTINE) 0.44-20.625 % OINT ointment; Apply topically 4 times daily as needed for skin protection or irritation  Dispense: 1 Tube; Refill: 0    3. Urinary frequency: Unclear cause at this time.  Short duration of 4 to 5 days.  Last glucose earlier this year was slightly high at 104 but not in diabetic range.  Does not have a high risk history for STDs but will check.  Screen for diabetes.  Also check UA for STI or evidence of kidney stones.  - UA with Microscopic reflex to Culture   - Hemoglobin A1c  - Chlamydia trachomatis PCR  - Neisseria gonorrhoeae PCR    4. Dysuria: Unclear cause at this time. Occurs only on rare occasions.  Does not have a high risk history for STDs but will check.  Screen for diabetes.  Also check UA for STI or evidence of kidney stones.  - UA with Microscopic reflex to Culture  - Chlamydia trachomatis PCR  - UA with Microscopic reflex to Culture    5. Screening for human immunodeficiency virus: Patient is amenable to HIV screen today.  - HIV Antigen Antibody Combo    6. Mild intermittent asthma without complication: ACT score 25.  Patient has no concerns.  No previous hospitalizations in the last year.  Asthma action plan given patient today.  Symptoms occur mostly in the wintertime.  Patient is willing to receive his  influenza vaccination today.  - Asthma Action Plan (AAP)    7. Need for prophylactic vaccination and inoculation against influenza: Flu shot given today.  - INFLUENZA VACCINE IM > 6 MONTHS VALENT IIV4 [99812]  - Vaccine Administration, Initial [27112]      BMI:   Estimated body mass index is 28.06 kg/m  as calculated from the following:    Height as of this encounter: 1.829 m (6').    Weight as of this encounter: 93.8 kg (206 lb 14.4 oz).   Weight management plan: Discussed healthy diet and exercise guidelines    Return in about 1 week (around 10/7/2019), or if symptoms worsen or fail to improve.    Can Gordon MD  [facility]    Answers for HPI/ROS submitted by the patient on 9/30/2019   If you checked off any problems, how difficult have these problems made it for you to do your work, take care of things at home, or get along with other people?: Somewhat difficult  PHQ9 TOTAL SCORE: 2  VIET 7 TOTAL SCORE: 5

## 2019-09-30 NOTE — LETTER
My Asthma Action Plan    Name: Rosendo Dallas   YOB: 1980  Date: 9/30/2019   My doctor: Can Gordon MD   My clinic: [facility]        My Rescue Medicine:   Albuterol inhaler (Proair/Ventolin/Proventil HFA)  2-4 puffs EVERY 4 HOURS as needed. Use a spacer if recommended by your provider.   My Asthma Severity:   Intermittent / Exercise Induced  Know your asthma triggers: cold air             GREEN ZONE   Good Control    I feel good    No cough or wheeze    Can work, sleep and play without asthma symptoms       Take your asthma control medicine every day.     1. If exercise triggers your asthma, take your rescue medication    15 minutes before exercise or sports, and    During exercise if you have asthma symptoms  2. Spacer to use with inhaler: If you have a spacer, make sure to use it with your inhaler             YELLOW ZONE Getting Worse  I have ANY of these:    I do not feel good    Cough or wheeze    Chest feels tight    Wake up at night   1. Keep taking your Green Zone medications  2. Start taking your rescue medicine:    every 20 minutes for up to 1 hour. Then every 4 hours for 24-48 hours.  3. If you stay in the Yellow Zone for more than 12-24 hours, contact your doctor.  4. If you do not return to the Green Zone in 12-24 hours or you get worse, start taking your oral steroid medicine if prescribed by your provider.           RED ZONE Medical Alert - Get Help  I have ANY of these:    I feel awful    Medicine is not helping    Breathing getting harder    Trouble walking or talking    Nose opens wide to breathe       1. Take your rescue medicine NOW  2. If your provider has prescribed an oral steroid medicine, start taking it NOW  3. Call your doctor NOW  4. If you are still in the Red Zone after 20 minutes and you have not reached your doctor:    Take your rescue medicine again and    Call 911 or go to the emergency room right away    See your regular doctor within 2 weeks  of an Emergency Room or Urgent Care visit for follow-up treatment.          Annual Reminders:  Meet with Asthma Educator,  Flu Shot in the Fall, consider Pneumonia Vaccination for patients with asthma (aged 19 and older).    Pharmacy: [id number]  [place], [place] - [address] AT [facility] [address]                        Asthma Triggers  How To Control Things That Make Your Asthma Worse    Triggers are things that make your asthma worse.  Look at the list below to help you find your triggers and   what you can do about them. You can help prevent asthma flare-ups by staying away from your triggers.      Trigger                                                          What you can do   Cigarette Smoke  Tobacco smoke can make asthma worse. Do not allow smoking in your home, car or around you.  Be sure no one smokes at a child s day care or school.  If you smoke, ask your health care provider for ways to help you quit.  Ask family members to quit too.  Ask your health care provider for a referral to Quit Plan to help you quit smoking, or call 2-026-675-PLAN.     Colds, Flu, Bronchitis  These are common triggers of asthma. Wash your hands often.  Don t touch your eyes, nose or mouth.  Get a flu shot every year.     Dust Mites  These are tiny bugs that live in cloth or carpet. They are too small to see. Wash sheets and blankets in hot water every week.   Encase pillows and mattress in dust mite proof covers.  Avoid having carpet if you can. If you have carpet, vacuum weekly.   Use a dust mask and HEPA vacuum.   Pollen and Outdoor Mold  Some people are allergic to trees, grass, or weed pollen, or molds. Try to keep your windows closed.  Limit time out doors when pollen count is high.   Ask you health care provider about taking medicine during allergy season.     Animal Dander  Some people are allergic to skin flakes, urine or saliva from pets with fur or feathers. Keep pets with fur or  feathers out of your home.    If you can t keep the pet outdoors, then keep the pet out of your bedroom.  Keep the bedroom door closed.  Keep pets off cloth furniture and away from stuffed toys.     Mice, Rats, and Cockroaches  Some people are allergic to the waste from these pests.   Cover food and garbage.  Clean up spills and food crumbs.  Store grease in the refrigerator.   Keep food out of the bedroom.   Indoor Mold  This can be a trigger if your home has high moisture. Fix leaking faucets, pipes, or other sources of water.   Clean moldy surfaces.  Dehumidify basement if it is damp and smelly.   Smoke, Strong Odors, and Sprays  These can reduce air quality. Stay away from strong odors and sprays, such as perfume, powder, hair spray, paints, smoke incense, paint, cleaning products, candles and new carpet.   Exercise or Sports  Some people with asthma have this trigger. Be active!  Ask your doctor about taking medicine before sports or exercise to prevent symptoms.    Warm up for 5-10 minutes before and after sports or exercise.     Other Triggers of Asthma  Cold air:  Cover your nose and mouth with a scarf.  Sometimes laughing or crying can be a trigger.  Some medicines and food can trigger asthma.

## 2019-10-01 LAB
C TRACH DNA SPEC QL NAA+PROBE: NEGATIVE
HIV 1+2 AB+HIV1 P24 AG SERPL QL IA: NONREACTIVE
N GONORRHOEA DNA SPEC QL NAA+PROBE: NEGATIVE
SPECIMEN SOURCE: NORMAL
SPECIMEN SOURCE: NORMAL

## 2019-10-01 ASSESSMENT — ANXIETY QUESTIONNAIRES: GAD7 TOTAL SCORE: 5

## 2019-10-01 ASSESSMENT — ASTHMA QUESTIONNAIRES: ACT_TOTALSCORE: 25

## 2019-10-15 ENCOUNTER — OFFICE VISIT (OUTPATIENT)
Dept: UROLOGY | Facility: CLINIC | Age: 39
End: 2019-10-15
Payer: COMMERCIAL

## 2019-10-15 VITALS
TEMPERATURE: 97.7 F | SYSTOLIC BLOOD PRESSURE: 138 MMHG | DIASTOLIC BLOOD PRESSURE: 92 MMHG | HEART RATE: 76 BPM | RESPIRATION RATE: 16 BRPM

## 2019-10-15 DIAGNOSIS — N41.0 PROSTATITIS, ACUTE: Primary | ICD-10-CM

## 2019-10-15 PROCEDURE — 51798 US URINE CAPACITY MEASURE: CPT | Performed by: UROLOGY

## 2019-10-15 PROCEDURE — 87086 URINE CULTURE/COLONY COUNT: CPT | Performed by: UROLOGY

## 2019-10-15 PROCEDURE — 99204 OFFICE O/P NEW MOD 45 MIN: CPT | Mod: 25 | Performed by: UROLOGY

## 2019-10-15 RX ORDER — SULFAMETHOXAZOLE/TRIMETHOPRIM 800-160 MG
1 TABLET ORAL 2 TIMES DAILY
Qty: 30 TABLET | Refills: 0 | Status: SHIPPED | OUTPATIENT
Start: 2019-10-15 | End: 2020-05-18

## 2019-10-15 NOTE — PATIENT INSTRUCTIONS
Per physician instructions.    If you have questions or concerns on any instructions given to you by your provider today or if you need to schedule an appointment, you can reach us at [phone].    Thank you!

## 2019-10-15 NOTE — NURSING NOTE
Chief Complaint   Patient presents with     Consult     Frequency, Pelvic Pain        Initial BP (!) 138/92 (BP Location: Right arm, Patient Position: Chair, Cuff Size: Adult Regular)   Pulse 76   Temp 97.7  F (36.5  C) (Tympanic)   Resp 16  Estimated body mass index is 28.06 kg/m  as calculated from the following:    Height as of 9/30/19: 1.829 m (6').    Weight as of 9/30/19: 93.8 kg (206 lb 14.4 oz).  BP completed using cuff size: regular   Medications and allergies reviewed.      Leticia ORDONEZ, CMA

## 2019-10-15 NOTE — NURSING NOTE
Active order to obtain bladder scan? Yes   Name of ordering provider:  Arthur   Bladder scan preformed post void Yes.  Bladder scan reveled 15ML  Provider notified?  Yes    Leticia ORDONEZ CMA

## 2019-10-16 ENCOUNTER — TRANSFERRED RECORDS (OUTPATIENT)
Dept: HEALTH INFORMATION MANAGEMENT | Facility: CLINIC | Age: 39
End: 2019-10-16

## 2019-10-16 LAB
BACTERIA SPEC CULT: NO GROWTH
Lab: NORMAL
SPECIMEN SOURCE: NORMAL

## 2019-10-16 NOTE — PROGRESS NOTES
Appointment source: New Patient  Patient name: Rosendo Dallas  Urology Staff: Jeison Gibson MD    Subjective: This is a 38 year old year old male complaining of recent onset of urethral discomfort and urinary frequency and urgency.    Noted urethral discomfort in the last several months. Occurs before or after urination. Denies dysuria.    History of childhood enuresis.    Review of systems: a comprehensive 10 point ROS was obtained and was otherwise negative except for that outlined above.    Problem list and histories reviewed & adjusted, as indicated.  Additional history: as documented    Objective:  Examination:    Healthy male  HEENT: anicteric sclera, normal extraocular movements  Respiratory: normal, non labored breathing  Musculoskeletal:Normal muscular movements  Skin normal temperature, no rash  Psychiatric: appropriate affect    Abdomen benign  No evidence of inguinal hernia  No evidence of inguinal adenopathy  Phallus without lesion. No evidence of peyronie's plaque  Scrotal contents normal  Rectal examination normal  Prostate benign to palpation. Not overtly boggy.    Assessment:  Possible congenital neurogenic bladder dysfunction. Symptoms also suggestive of prostatitis.    Plan:  Will approach initially as prostatitis with a short course of antibiotics employed as an antiinflammatory.    Will consider an anticholinergic medication if symptoms do not gustavo with antibiotics.

## 2019-10-24 ENCOUNTER — MYC MEDICAL ADVICE (OUTPATIENT)
Dept: UROLOGY | Facility: CLINIC | Age: 39
End: 2019-10-24

## 2019-10-24 DIAGNOSIS — R39.15 URINARY URGENCY: Primary | ICD-10-CM

## 2019-10-25 RX ORDER — TOLTERODINE 4 MG/1
4 CAPSULE, EXTENDED RELEASE ORAL DAILY
Qty: 90 CAPSULE | Refills: 3 | Status: SHIPPED | OUTPATIENT
Start: 2019-10-25 | End: 2020-05-18

## 2019-10-25 NOTE — TELEPHONE ENCOUNTER
Tamsulosin had no impact on symptoms. Will try tolterodine in view of congenital neurogenic bladder dysfunction.

## 2019-11-06 ENCOUNTER — TRANSFERRED RECORDS (OUTPATIENT)
Dept: HEALTH INFORMATION MANAGEMENT | Facility: CLINIC | Age: 39
End: 2019-11-06

## 2020-01-21 ENCOUNTER — TRANSFERRED RECORDS (OUTPATIENT)
Dept: HEALTH INFORMATION MANAGEMENT | Facility: CLINIC | Age: 40
End: 2020-01-21

## 2020-01-23 ENCOUNTER — TRANSFERRED RECORDS (OUTPATIENT)
Dept: HEALTH INFORMATION MANAGEMENT | Facility: CLINIC | Age: 40
End: 2020-01-23

## 2020-02-05 ENCOUNTER — TELEPHONE (OUTPATIENT)
Dept: NEUROLOGY | Facility: CLINIC | Age: 40
End: 2020-02-05

## 2020-02-05 ENCOUNTER — OFFICE VISIT (OUTPATIENT)
Dept: NEUROLOGY | Facility: CLINIC | Age: 40
End: 2020-02-05
Payer: COMMERCIAL

## 2020-02-05 VITALS
WEIGHT: 207.6 LBS | OXYGEN SATURATION: 98 % | BODY MASS INDEX: 28.16 KG/M2 | HEART RATE: 88 BPM | DIASTOLIC BLOOD PRESSURE: 92 MMHG | RESPIRATION RATE: 16 BRPM | SYSTOLIC BLOOD PRESSURE: 127 MMHG

## 2020-02-05 DIAGNOSIS — R51.9 NEW ONSET OF HEADACHES: Primary | ICD-10-CM

## 2020-02-05 DIAGNOSIS — M79.18 MYOFASCIAL PAIN: ICD-10-CM

## 2020-02-05 DIAGNOSIS — E23.6 EMPTY SELLA (H): ICD-10-CM

## 2020-02-05 ASSESSMENT — PAIN SCALES - GENERAL: PAINLEVEL: MODERATE PAIN (5)

## 2020-02-05 NOTE — TELEPHONE ENCOUNTER
[facility] Call Center    Phone Message    May a detailed message be left on voicemail: yes     Reason for Call: Order(s): Other:   Reason for requested: MRI Brain   Date needed: as soon as possible   Provider name: Lorena Lemos    Please fax order to [facility] #[phone].       Action Taken: Message routed to:  [facility] ([facility]):  neuro     Travel Screening: Not Applicable

## 2020-02-05 NOTE — PROGRESS NOTES
Re: Rosendo Dallas      MRN# [id number]  YOB: 1980  Date of Visit:2/5/2020    OUTPATIENT NEUROLOGY VISIT NOTE    Reason for Visit:  Headache follow up    Interval History:  Rosendo Dallas is a 39-year-old male presents to the clinic today for headache follow up.   Patient was initially seen in our Clinic on 7/16/2018 for headache evaluation and asymptomatic empty sella. Preventive headache treatment was discussed with the patient at his initial visit.   Today patient presents to our Clinic with worsening headache symptoms. Patient went to the [facility] ED on 4/11/2019 for worsening headache and acute headache treatment.   ED provider's note reviewed and negative ED evaluation. Patient received acute headache treatment with IV fluids, ketorolac and metoclopramide.   ED treatment:  04/11/2019 1049 diphenhydrAMINE 50 mg injection (BENADRYL) 50 mg Intravenous Given   04/11/2019 1048 ketorolac 15 mg injection (TORADOL) 15 mg Intravenous Given   04/11/2019 1052 metoclopramide 10 mg injection (REGLAN) 10 mg Intravenous Given   04/11/2019 1047 NaCl 0.9% 1,000 mL 1,000 mL Intravenous New Bag   04/11/2019 1119 NaCl 0.9% 1,000 mL 0 mL Intravenous Stopped     Patient reports that he did have 1-2 headaches intermittently per day and lasting a couple hours. Right eye and both sides and usually in the afternoon and evening.   3 weeks ago worsening of headaches and daily and at the worst and 7/10 and lasting 3-4 hours and than less than 7/10. Pain feels sharp and down and worse with neck movements and limited neck movements to the rights. Neck feels restricted with movenets to the right and flexion. Tylenol and ibuprofen were not helping. Associated -noise sensitivity and dizzy feeling, nausea, no vomiting, no light sensitivity. Reports more floaters in both eyes. Headaches are worse with getting up and turning his neck but laying down doesn't make headache worse.   Hydration-8-10 cups per day  Caffeine -1 cup per  day  Struggle sleeping because of pain -wakes him up.   Sitting or walking around-health care administration.   Patient reports that he took amitriptyline 10 mg at bedtime for a couple weeks because headaches got better.   Brain MRI on 4/11/2019 and reviewed with a partially empty sella.   Turning to the right triggers right fingers tingeling and dizziness feeling for 15-20 seconds and goes back to normal.     Denies history of head or neck trauma, dizziness, vertigo, loss of consciousness, seizure, double vision, blurred vision, hearing difficulty, speech or swallowing difficulty, weakness or numbness in face, arms or legs, urinary or bowel incontinence, coordination problems or gait difficulty, fever or chills.      Past Medical History reviewed   Social History     Tobacco Use     Smoking status: Never Smoker     Smokeless tobacco: Never Used   Substance Use Topics     Alcohol use: Yes     Comment: 2 - 3 drinks a week    reviewed and verified with the patient     Allergies   Allergen Reactions     Seasonal Allergies Itching       Current Outpatient Medications   Medication Sig Dispense Refill     omeprazole (PRILOSEC) 10 MG capsule Take by mouth 30-60 minutes before a meal. 60 capsule      albuterol (PROAIR HFA) 108 (90 Base) MCG/ACT inhaler Inhale 1-2 puffs into the lungs every 4 hours as needed for shortness of breath / dyspnea or wheezing 1 Inhaler 11     menthol-zinc oxide (CALMOSEPTINE) 0.44-20.625 % OINT ointment Apply topically 4 times daily as needed for skin protection or irritation 1 Tube 0     sulfamethoxazole-trimethoprim (BACTRIM DS/SEPTRA DS) 800-160 MG tablet Take 1 tablet by mouth 2 times daily for ten days and then one tablet daily for another 10 days 30 tablet 0     tolterodine ER (DETROL LA) 4 MG 24 hr capsule Take 1 capsule (4 mg) by mouth daily 90 capsule 3   reviewed and verified with the patient    Review of Systems:   A 10-point ROS including constitutional, eyes, respiratory,  cardiovascular, gastroenterology, genitourinary, integumentary, musculoskeletal, neurology and psychiatric were all negative except as mentioned in the interval history.      General Exam:   BP (!) 127/92   Pulse 88   Resp 16   Wt 94.2 kg (207 lb 9.6 oz)   SpO2 98%   BMI 28.16 kg/m    GEN: Awake, NAD; good eye contact, responses appropriately, headache today 7/10   HEENT: Head atraumatic/Normocephalic. Scalp normal. Occipital tenderness and tender points. Pupils equally round, 4 mm, reactive to light and accommodation, sclera and conjunctiva normal. Fundoscopic examination reveals normal vessels no papilledema.   Neck: Easily moveable without resistance  Heart: S1/S2 appreciated, RRR, no m/r/g, no carotid bruits  Lungs:Lungs are clear to auscultation bilaterally, no wheezes or crackles.   Neurological Examination:  The patient is alert and oriented times four. Has good attention and concentration. Speech is fluent without dysarthria. EOM intact. There is no nystagmus. Has conjugated gaze. Face is symmetrical. Intact and symmetrical eyebrow and lid raise and eyelid closure, smiles. Hearing Intact to conversation speech. The palates elevates symmetrical. The tongue protrudes midline with no atrophy or fasciculations. Strength  5/5 in the upper and lower extremities bilaterally. Sensation is intact to  touch throughout.  Reflexes symmetrical at biceps, triceps, brachioradialis, patellar, and Achilles. Negative Babinski with downgoing toes bilaterally. Coordination reveals finger-nose-finger, rapid alternating movements with normal speed and accuracy. Normal casual gait.    Assessment and Plan:  Headache new in the back of his his head possible mixed etiology -tension, myofascial, occipital     Plan:  Brain MRI for interval changes and because of new headaches  Re-start amitriptyline 10 mg at bedtime  PMR referral for neck assessment and trigger points or/and occipital nerve block if indicated and no relief with  "re-starting amitriptyline  Follow up after the imaging      I discussed all my recommendation with Rosendo Dallas. The patient verbalizes understanding and comfortable with the plan. The patient has our clinic phone number to call with any questions or concerns. All of the patient's questions were answered from the best of my current knowledge.     Time spent with pt answering questions, discussing findings, counseling and coordinating care was more than 50% the appointment time, 36 minutes.         BINTA Beebe, CNP  [facility]    Per report, \"unchanged, normal MR appearance the brain without evidence of intracranial mass, intracranial hemorrhage or acute or subacute infarction. Mild, patchy bilateral ethmoid air cell mucosal thickening and unchanged rightward nasal septal deviation with rightward nasal septal osseous spurring.Unchanged, incidentally noted partially empty sella turcica\"   In overall your brain MRI is stable and nothing acute to explain your current symptoms at this time.   Plan as discussed to restart amitriptyline.   We could try rizatriptan or sumatriptan (acute migraine medications) as needed for acute pain. Let me know if you would like me to prescribe it to you for acute headache as needed.   You had this headache for too long-3 weeks so re-starting preventive treatment is the best way.   We could try occipital nerve block in our Clinic to break your headache cycle or wait and see if amitriptyline helps.       "

## 2020-02-05 NOTE — LETTER
2/5/2020       RE: Rosendo Dallas  [address]  [facility] [address]     Dear Colleague,    Thank you for referring your patient, Rosendo Dallas, to the [facility] at [facility]. Please see a copy of my visit note below.    Re: Rosendo Dallas      MRN# [id number]  YOB: 1980  Date of Visit:2/5/2020    OUTPATIENT NEUROLOGY VISIT NOTE    Reason for Visit:  Headache follow up    Interval History:  Rosendo Dallas is a 39-year-old male presents to the clinic today for headache follow up.   Patient was initially seen in our Clinic on 7/16/2018 for headache evaluation and asymptomatic empty sella. Preventive headache treatment was discussed with the patient at his initial visit.   Today patient presents to our Clinic with worsening headache symptoms. Patient went to the [facility] ED on 4/11/2019 for worsening headache and acute headache treatment.   ED provider's note reviewed and negative ED evaluation. Patient received acute headache treatment with IV fluids, ketorolac and metoclopramide.   ED treatment:  04/11/2019 1049 diphenhydrAMINE 50 mg injection (BENADRYL) 50 mg Intravenous Given   04/11/2019 1048 ketorolac 15 mg injection (TORADOL) 15 mg Intravenous Given   04/11/2019 1052 metoclopramide 10 mg injection (REGLAN) 10 mg Intravenous Given   04/11/2019 1047 NaCl 0.9% 1,000 mL 1,000 mL Intravenous New Bag   04/11/2019 1119 NaCl 0.9% 1,000 mL 0 mL Intravenous Stopped     Patient reports that he did have 1-2 headaches intermittently per day and lasting a couple hours. Right eye and both sides and usually in the afternoon and evening.   3 weeks ago worsening of headaches and daily and at the worst and 7/10 and lasting 3-4 hours and than less than 7/10. Pain feels sharp and down and worse with neck movements and limited neck movements to the rights. Neck feels restricted with movenets to the right and flexion. Tylenol and ibuprofen were not helping. Associated  -noise sensitivity and dizzy feeling, nausea, no vomiting, no light sensitivity. Reports more floaters in both eyes. Headaches are worse with getting up and turning his neck but laying down doesn't make headache worse.   Hydration-8-10 cups per day  Caffeine -1 cup per day  Struggle sleeping because of pain -wakes him up.   Sitting or walking around-health care administration.   Patient reports that he took amitriptyline 10 mg at bedtime for a couple weeks because headaches got better.   Brain MRI on 4/11/2019 and reviewed with a partially empty sella.   Turning to the right triggers right fingers tingeling and dizziness feeling for 15-20 seconds and goes back to normal.     Denies history of head or neck trauma, dizziness, vertigo, loss of consciousness, seizure, double vision, blurred vision, hearing difficulty, speech or swallowing difficulty, weakness or numbness in face, arms or legs, urinary or bowel incontinence, coordination problems or gait difficulty, fever or chills.      Past Medical History reviewed   Social History     Tobacco Use     Smoking status: Never Smoker     Smokeless tobacco: Never Used   Substance Use Topics     Alcohol use: Yes     Comment: 2 - 3 drinks a week    reviewed and verified with the patient     Allergies   Allergen Reactions     Seasonal Allergies Itching       Current Outpatient Medications   Medication Sig Dispense Refill     omeprazole (PRILOSEC) 10 MG capsule Take by mouth 30-60 minutes before a meal. 60 capsule      albuterol (PROAIR HFA) 108 (90 Base) MCG/ACT inhaler Inhale 1-2 puffs into the lungs every 4 hours as needed for shortness of breath / dyspnea or wheezing 1 Inhaler 11     menthol-zinc oxide (CALMOSEPTINE) 0.44-20.625 % OINT ointment Apply topically 4 times daily as needed for skin protection or irritation 1 Tube 0     sulfamethoxazole-trimethoprim (BACTRIM DS/SEPTRA DS) 800-160 MG tablet Take 1 tablet by mouth 2 times daily for ten days and then one tablet daily  for another 10 days 30 tablet 0     tolterodine ER (DETROL LA) 4 MG 24 hr capsule Take 1 capsule (4 mg) by mouth daily 90 capsule 3   reviewed and verified with the patient    Review of Systems:   A 10-point ROS including constitutional, eyes, respiratory, cardiovascular, gastroenterology, genitourinary, integumentary, musculoskeletal, neurology and psychiatric were all negative except as mentioned in the interval history.      General Exam:   BP (!) 127/92   Pulse 88   Resp 16   Wt 94.2 kg (207 lb 9.6 oz)   SpO2 98%   BMI 28.16 kg/m     GEN: Awake, NAD; good eye contact, responses appropriately, headache today 7/10   HEENT: Head atraumatic/Normocephalic. Scalp normal. Occipital tenderness and tender points. Pupils equally round, 4 mm, reactive to light and accommodation, sclera and conjunctiva normal. Fundoscopic examination reveals normal vessels no papilledema.   Neck: Easily moveable without resistance  Heart: S1/S2 appreciated, RRR, no m/r/g, no carotid bruits  Lungs:Lungs are clear to auscultation bilaterally, no wheezes or crackles.   Neurological Examination:  The patient is alert and oriented times four. Has good attention and concentration. Speech is fluent without dysarthria. EOM intact. There is no nystagmus. Has conjugated gaze. Face is symmetrical. Intact and symmetrical eyebrow and lid raise and eyelid closure, smiles. Hearing Intact to conversation speech. The palates elevates symmetrical. The tongue protrudes midline with no atrophy or fasciculations. Strength  5/5 in the upper and lower extremities bilaterally. Sensation is intact to  touch throughout.  Reflexes symmetrical at biceps, triceps, brachioradialis, patellar, and Achilles. Negative Babinski with downgoing toes bilaterally. Coordination reveals finger-nose-finger, rapid alternating movements with normal speed and accuracy. Normal casual gait.    Assessment and Plan:  Headache new in the back of his his head possible mixed etiology  "-tension, myofascial, occipital     Plan:  Brain MRI for interval changes and because of new headaches  Re-start amitriptyline 10 mg at bedtime  PMR referral for neck assessment and trigger points or/and occipital nerve block if indicated and no relief with re-starting amitriptyline  Follow up after the imaging      I discussed all my recommendation with Rosendo Dallas. The patient verbalizes understanding and comfortable with the plan. The patient has our clinic phone number to call with any questions or concerns. All of the patient's questions were answered from the best of my current knowledge.     Time spent with pt answering questions, discussing findings, counseling and coordinating care was more than 50% the appointment time, 36 minutes.         BINTA Beebe, CNP  [facility]    Per report, \"unchanged, normal MR appearance the brain without evidence of intracranial mass, intracranial hemorrhage or acute or subacute infarction. Mild, patchy bilateral ethmoid air cell mucosal thickening and unchanged rightward nasal septal deviation with rightward nasal septal osseous spurring.Unchanged, incidentally noted partially empty sella turcica\"   In overall your brain MRI is stable and nothing acute to explain your current symptoms at this time.   Plan as discussed to restart amitriptyline.   We could try rizatriptan or sumatriptan (acute migraine medications) as needed for acute pain. Let me know if you would like me to prescribe it to you for acute headache as needed.   You had this headache for too long-3 weeks so re-starting preventive treatment is the best way.   We could try occipital nerve block in our Clinic to break your headache cycle or wait and see if amitriptyline helps.         Again, thank you for allowing me to participate in the care of your patient.      Sincerely,    BINTA Cardoso CNP      "

## 2020-02-05 NOTE — PATIENT INSTRUCTIONS
Plan:  Brain MRI for interval changes and because of new headaches  Re-start amitriptyline 10 mg at bedtime  PMR referral for neck assessment and trigger points or/and occipital nerve block is indicated  Follow up after the imaging

## 2020-02-06 ENCOUNTER — TRANSFERRED RECORDS (OUTPATIENT)
Dept: HEALTH INFORMATION MANAGEMENT | Facility: CLINIC | Age: 40
End: 2020-02-06

## 2020-02-10 ENCOUNTER — TELEPHONE (OUTPATIENT)
Dept: PHYSICAL MEDICINE AND REHAB | Facility: CLINIC | Age: 40
End: 2020-02-10

## 2020-02-10 NOTE — TELEPHONE ENCOUNTER
VM left asking for a return call.     Referral placed to PMR clinic. The patient can be scheduled as a new patient for evaluation with Dr. Bang or Dr. Melara. (Scheduling with Dr. Melara will be in May)

## 2020-02-11 ENCOUNTER — DOCUMENTATION ONLY (OUTPATIENT)
Dept: NEUROLOGY | Facility: CLINIC | Age: 40
End: 2020-02-11

## 2020-02-17 NOTE — TELEPHONE ENCOUNTER
Third attempt to reach the patient. Vm left asking for a return call. Will close encounter at this time.

## 2020-03-10 ENCOUNTER — HEALTH MAINTENANCE LETTER (OUTPATIENT)
Age: 40
End: 2020-03-10

## 2020-03-22 ENCOUNTER — VIRTUAL VISIT (OUTPATIENT)
Dept: FAMILY MEDICINE | Facility: OTHER | Age: 40
End: 2020-03-22

## 2020-03-22 NOTE — PROGRESS NOTES
"Date: 2020 01:26:50  Clinician: Clemencia Valerio  Clinician NPI: [id number]  Patient: Rosendo Dallas  Patient : 1980  Patient Address: [address] [place] [address] [place] , [place] [address]  Patient Phone: [phone]  Visit Protocol: URI  Patient Summary:  Rosendo is a 39 year old ( : 1980 ) male who initiated a Visit for COVID-19 (Coronavirus) evaluation and screening. When asked the question \"Please sign me up to receive news, health information and promotions from [employer].\", Rosendo responded \"No\".    Rosendo states his symptoms started gradually 3-6 days ago. After his symptoms started, they improved and then got worse again.   His symptoms consist of rhinitis, myalgia, a cough, nasal congestion, malaise, chills, and a headache. He is experiencing mild difficulty breathing with activities but can speak normally in full sentences.   Symptom details     Nasal secretions: The color of his mucus is clear, blood-tinged, and yellow.    Cough: Rosendo coughs a few times an hour and his cough is more bothersome at night. Phlegm comes into his throat when he coughs. He does not believe his cough is caused by post-nasal drip. The color of the phlegm is yellow.     Headache: He states the headache is moderate (4-6 on a 10 point pain scale).      Rosendo denies having ear pain, facial pain or pressure, wheezing, sore throat, teeth pain, and fever. He also denies having a sinus infection within the past year, taking antibiotic medication for the symptoms, and having recent facial or sinus surgery in the past 60 days.   Precipitating events  He has not recently been exposed to someone with influenza. Rosendo has not been in close contact with any high risk individuals.   Pertinent COVID-19 (Coronavirus) information  Rosendo has not traveled internationally or to the areas where COVID-19 (Coronavirus) is widespread, including cruise ship travel in the last 14 days before the start of his symptoms.   Rosendo has not had a close contact " with a laboratory-confirmed COVID-19 patient within 14 days of symptom onset. He has had a close contact with a suspected COVID-19 patient within 14 days of symptom onset. 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   Pertinent medical history  Rosendo does not need a return to work/school note.   Weight: 202 lbs   Rosendo does not smoke or use smokeless tobacco.   Weight: 202 lbs    MEDICATIONS: omeprazole oral, ALLERGIES: NKDA  Clinician Response:  Dear Rosendo,  Based on the information provided, you have an influenza-like illness. This is an infection that has the same symptoms of the flu, but the specific virus is not known. Lab testing would be required to confirm the flu virus, but this is often not necessary because the treatment will be the same no matter what is causing your symptoms.  Your symptoms should improve gradually over the next week.  Medication information  The CDC recommends treatment for flu only if antiviral medications can be started within 48 hours of the first flu symptoms or if you fall into one of the high-risk groups that are more likely to get flu complications.  Since you do not meet guidelines for treatment with antiviral medications, antiviral treatment is not recommended for you. Treatment focuses on controlling your symptoms.  Unless you are allergic to the over-the-counter medication(s) below, I recommend using:       Acetaminophen (Tylenol or store brand) oral tablet. Take 1-2 tablets by mouth every 4-6 hours to help with the discomfort.      Guaifenesin + dextromethorphan (Robitussin DM, Mucinex DM, or store brand).     Over-the-counter medications do not require a prescription. Ask the pharmacist if you have any questions.  Self care  Steps you can take to be as comfortable as possible:      Rest.    Drink plenty of water and other liquids.    Take a hot shower to loosen congestion    Take a spoonful of honey to reduce your cough.     If you have a fever, stay home until your temperature has returned to normal for 24 hours and you feel well enough for daily activities. And of course, wash your hands often to prevent spreading the flu and other illnesses. However, the best way to prevent the flu is to get a flu shot before each flu season.  When to seek care  Please be seen in a clinic or urgent care if new symptoms develop, or symptoms become worse.  Additional treatment plan   Based on the information you have provided, you do have symptoms that are consistent with Coronavirus (COVID-19).  The coronavirus causes mild to severe respiratory illness with the most common symptoms including fever, cough and difficulty breathing. Unfortunately, many viruses cause similar symptoms and it can be difficult to distinguish between viruses, especially in mild cases, so we are presuming that anyone with cough or fever has coronavirus at this time.  Coronavirus/COVID-19 has reached the point of community spread in [place], meaning that we are finding the virus in people with no known exposure risk for jorge the virus. Given the increasing commonness of coronavirus in the community we are no longer testing patients who are not critically ill.  If you are a health care worker, you should refer to your employee health office for instructions about testing and returning to work.  For everyone else who has cough or fever, you should assume you are infected with coronavirus. Since you will not be tested but have symptoms that may be consistent with coronavirus, the CDC recommends you stay in self-isolation until these three things have happened:    You have had no fever for at least 72 hours (that is three full days of no fever without the use of medicine that reduces fevers)    AND   Other symptoms have improved  (for example, when your cough or shortness of breath have improved)   AND   At least 7 days have passed since your symptoms first appeared.   How to Isolate:   Isolate yourself at home.  Do Not allow any visitors  Do Not go to work or school  Do Not go to Jewish,  centers, shopping, or other public places.  Do Not shake hands.  Avoid close contact with others (hugging, kissing).   Protect Others:   Cover Your Mouth and Nose with a mask, disposable tissue or wash cloth to avoid spreading germs to others.  Wash your hands and face frequently with soap and water.   We know it can be scary to hear that you might have COVID-19. Our team can help track your symptoms and make sure you are doing ok over the next two weeks using a program called [employer] to keep in touch. When you receive an email from [employer], please consider enrolling in our monitoring program. There is no cost to you for monitoring. Here is a URL where you can learn more: [email or web address]  Managing Symptoms:   At this time, we primarily recommend Tylenol (Acetaminophen) for fever or pain. If you have liver or kidney problems, contact your primary care provider for instructions on use of tylenol. Adults can take 650 mg (two 325 mg pills) by mouth every 4-6 hours as needed OR 1,000 mg (two 500 mg pills) every 8 hours as needed. MAXIMUM DAILY DOSE: 3,000mg. For children, refer to dosing on bottle based on age or weight.   If you develop significant shortness of breath that prevents you from doing normal activities, please call 911 or proceed to the nearest emergency room and alert them immediately that you have been in self-isolation for possible coronavirus.  For more information about COVID19 and options for caring for yourself at home, please visit the CDC website at https://www.cdc.gov/coronavirus/2019-ncov/about/steps-when-sick.htmlFor more options for care at [facility], please visit our website at  [email or web address]    Diagnosis: Cough  Diagnosis ICD: R05

## 2020-03-25 ENCOUNTER — RESULTS ONLY (OUTPATIENT)
Dept: LAB | Age: 40
End: 2020-03-25

## 2020-03-25 ENCOUNTER — OFFICE VISIT (OUTPATIENT)
Dept: URGENT CARE | Facility: URGENT CARE | Age: 40
End: 2020-03-25
Payer: COMMERCIAL

## 2020-03-25 DIAGNOSIS — Z20.822 SUSPECTED COVID-19 VIRUS INFECTION: Primary | ICD-10-CM

## 2020-03-25 PROCEDURE — 99207 ZZC NO BILLABLE SERVICE THIS VISIT: CPT

## 2020-03-25 NOTE — PATIENT INSTRUCTIONS
[facility] Employee Health team will receive your Covid 19 test results in the next 1-4 days.  Once your results are received, Employee Health will call you with your test result and discuss your Return to Work timeline and criteria.

## 2020-03-27 LAB
COVID-19 VIRUS PCR TO MAYO - RESULT: NORMAL
SARS-COV-2 RNA SPEC QL NAA+PROBE: ABNORMAL
SPECIMEN SOURCE: ABNORMAL
SPECIMEN SOURCE: NORMAL

## 2020-04-06 ENCOUNTER — ANCILLARY PROCEDURE (OUTPATIENT)
Dept: GENERAL RADIOLOGY | Facility: CLINIC | Age: 40
End: 2020-04-06
Attending: PHYSICIAN ASSISTANT
Payer: COMMERCIAL

## 2020-04-06 ENCOUNTER — OFFICE VISIT (OUTPATIENT)
Dept: URGENT CARE | Facility: URGENT CARE | Age: 40
End: 2020-04-06
Payer: COMMERCIAL

## 2020-04-06 VITALS
BODY MASS INDEX: 28.35 KG/M2 | WEIGHT: 209 LBS | TEMPERATURE: 98.2 F | DIASTOLIC BLOOD PRESSURE: 102 MMHG | SYSTOLIC BLOOD PRESSURE: 153 MMHG | HEART RATE: 99 BPM | OXYGEN SATURATION: 100 % | RESPIRATION RATE: 16 BRPM

## 2020-04-06 DIAGNOSIS — R06.02 SOB (SHORTNESS OF BREATH): Primary | ICD-10-CM

## 2020-04-06 DIAGNOSIS — J20.9 ACUTE BRONCHITIS WITH SYMPTOMS > 10 DAYS: ICD-10-CM

## 2020-04-06 PROCEDURE — 99214 OFFICE O/P EST MOD 30 MIN: CPT | Performed by: PHYSICIAN ASSISTANT

## 2020-04-06 PROCEDURE — 71046 X-RAY EXAM CHEST 2 VIEWS: CPT

## 2020-04-06 RX ORDER — AZITHROMYCIN 250 MG/1
TABLET, FILM COATED ORAL
Qty: 6 TABLET | Refills: 0 | Status: SHIPPED | OUTPATIENT
Start: 2020-04-06 | End: 2020-05-18

## 2020-04-06 RX ORDER — ALBUTEROL SULFATE 90 UG/1
2 AEROSOL, METERED RESPIRATORY (INHALATION) EVERY 6 HOURS PRN
Qty: 1 INHALER | Refills: 1 | Status: SHIPPED | OUTPATIENT
Start: 2020-04-06 | End: 2020-05-18

## 2020-04-06 ASSESSMENT — ENCOUNTER SYMPTOMS
EYES NEGATIVE: 1
RHINORRHEA: 0
LIGHT-HEADEDNESS: 0
CHILLS: 0
WHEEZING: 0
VOMITING: 0
MYALGIAS: 0
EYE DISCHARGE: 0
DIAPHORESIS: 0
ADENOPATHY: 0
GASTROINTESTINAL NEGATIVE: 1
COUGH: 0
SORE THROAT: 1
DIARRHEA: 0
NEUROLOGICAL NEGATIVE: 1
MUSCULOSKELETAL NEGATIVE: 1
SHORTNESS OF BREATH: 1
DYSURIA: 0
CONSTITUTIONAL NEGATIVE: 1
DIZZINESS: 0
WEAKNESS: 0
HEADACHES: 0
FREQUENCY: 0
POLYDIPSIA: 0
PALPITATIONS: 0
EYE REDNESS: 0
HEMATURIA: 0
CHEST TIGHTNESS: 0
EYE ITCHING: 0
NAUSEA: 0
ENDOCRINE NEGATIVE: 1
ABDOMINAL PAIN: 0
FEVER: 0

## 2020-04-06 NOTE — PATIENT INSTRUCTIONS
Instructions for Patients  Your symptoms could be due to COVID-19, but it also could be due to a number of other respiratory illnesses.  We are not doing testing at this time for COVID-19 unless symptoms are severe enough to require hospitalization.  It is recommended that you stay home to prevent the spread of your illness.  To do this follow these instructions:    Regardless of if you have been tested or not:  Patient who have symptoms (cough, fever, or shortness of breath), need to isolate for 7 days from when symptoms started AND 72 hours after fever resolves (without fever reducing medications) AND improvement of respiratory symptoms (whichever is longer).      Isolate yourself at home (in own room/own bathroom if possible)    Do Not allow any visitors    Do Not go to work or school    Do Not go to Shinto,  centers, shopping, or other public places.    Do Not shake hands.    Avoid close and intimate contact with others (hugging, kissing).    Follow CDC recommendations for household cleaning of frequently touched services.     After the initial 7 days, continue to isolate yourself from household members as much as possible. To continue decrease the risk of community spread and exposure, you and any members of your household should limit activities in public for 14 days after starting home isolation.     You can reference the following CDC link for helpful home isolation/care tips:  https://www.cdc.gov/coronavirus/2019-ncov/downloads/10Things.pdf    Protect Others:    Cover your mouth and nose with a mask, disposable tissue or wash cloth to avoid spreading germs to others.    Wash your hands and face frequently with soap and water.    Fever Medicines:    For fever relief, take acetaminophen or ibuprofen.    Treat fevers above 101  F (38.3  C) to lower fevers and make you more comfortable.     Acetaminophen (e.g., Tylenol): Take 650 mg (two 325 mg pills) by mouth every 4-6 hours as needed of regular  strength Tylenol or 1,000 mg (two 500 mg pills) every 8 hours as needed of Extra Strength Tylenol.     Ibuprofen (e.g., Motrin, Advil): Take 400 mg (two 200 mg pills) by mouth every 6 hours as needed.     Acetaminophen is thought to be safer than ibuprofen or naproxen for people over 65 years old. Acetaminophen is in many OTC and prescription medicines. It might be in more than one medicine that you are taking. You need to be careful and not take an overdose. Before taking any medicine, read all the instructions on the package.    Caution - NSAIDs (e.g., ibuprofen, naproxen): Do not take nonsteroidal anti-inflammatory drugs (NSAIDs) if you have stomach problems, kidney disease, heart failure, or other contraindications to using this type of medicine. Do not take NSAID medicines for over 7 days without consulting your PCP. Do not take NSAID medicines if you are pregnant. Do not take NSAID medicines if you are also taking blood thinners.     Call Back If: Breathing difficulty develops or you become worse.    Thank you for limiting contact with others, wearing a simple mask to cover your cough, practice good hand hygiene habits and accessing our virtual services where possible to limit the spread of this virus.    For more information about COVID19 and options for caring for yourself at home, please visit the CDC website at https://www.cdc.gov/coronavirus/2019-ncov/about/steps-when-sick.html  For more options for care at [facility], please visit our website at [email or web address]

## 2020-04-27 ENCOUNTER — TRANSFERRED RECORDS (OUTPATIENT)
Dept: HEALTH INFORMATION MANAGEMENT | Facility: CLINIC | Age: 40
End: 2020-04-27

## 2020-05-01 ENCOUNTER — VIRTUAL VISIT (OUTPATIENT)
Dept: FAMILY MEDICINE | Facility: CLINIC | Age: 40
End: 2020-05-01
Payer: COMMERCIAL

## 2020-05-01 DIAGNOSIS — J20.9 ACUTE BRONCHITIS WITH SYMPTOMS > 10 DAYS: Primary | ICD-10-CM

## 2020-05-01 DIAGNOSIS — J45.21 MILD INTERMITTENT ASTHMA WITH EXACERBATION: ICD-10-CM

## 2020-05-01 PROCEDURE — 99213 OFFICE O/P EST LOW 20 MIN: CPT | Mod: TEL | Performed by: INTERNAL MEDICINE

## 2020-05-01 RX ORDER — PREDNISONE 20 MG/1
TABLET ORAL
Qty: 20 TABLET | Refills: 0 | Status: SHIPPED | OUTPATIENT
Start: 2020-05-01 | End: 2020-05-18

## 2020-05-01 ASSESSMENT — ASTHMA QUESTIONNAIRES
QUESTION_2 LAST FOUR WEEKS HOW OFTEN HAVE YOU HAD SHORTNESS OF BREATH: MORE THAN ONCE A DAY
ACT_TOTALSCORE: 13
QUESTION_1 LAST FOUR WEEKS HOW MUCH OF THE TIME DID YOUR ASTHMA KEEP YOU FROM GETTING AS MUCH DONE AT WORK, SCHOOL OR AT HOME: SOME OF THE TIME
QUESTION_5 LAST FOUR WEEKS HOW WOULD YOU RATE YOUR ASTHMA CONTROL: POORLY CONTROLLED
QUESTION_4 LAST FOUR WEEKS HOW OFTEN HAVE YOU USED YOUR RESCUE INHALER OR NEBULIZER MEDICATION (SUCH AS ALBUTEROL): ONE OR TWO TIMES PER DAY
QUESTION_3 LAST FOUR WEEKS HOW OFTEN DID YOUR ASTHMA SYMPTOMS (WHEEZING, COUGHING, SHORTNESS OF BREATH, CHEST TIGHTNESS OR PAIN) WAKE YOU UP AT NIGHT OR EARLIER THAN USUAL IN THE MORNING: NOT AT ALL

## 2020-05-01 ASSESSMENT — PATIENT HEALTH QUESTIONNAIRE - PHQ9: SUM OF ALL RESPONSES TO PHQ QUESTIONS 1-9: 3

## 2020-05-01 NOTE — PROGRESS NOTES
"Rosendo Dallas is a 39 year old male who is being evaluated via a billable telephone visit.      The patient has been notified of following:     \"This telephone visit will be conducted via a call between you and your physician/provider. We have found that certain health care needs can be provided without the need for a physical exam.  This service lets us provide the care you need with a short phone conversation.  If a prescription is necessary we can send it directly to your pharmacy.  If lab work is needed we can place an order for that and you can then stop by our lab to have the test done at a later time.    Telephone visits are billed at different rates depending on your insurance coverage. During this emergency period, for some insurers they may be billed the same as an in-person visit.  Please reach out to your insurance provider with any questions.    If during the course of the call the physician/provider feels a telephone visit is not appropriate, you will not be charged for this service.\"    Patient has given verbal consent for Telephone visit?  Yes    How would you like to obtain your AVS? Josh Villagomez     Rosendo Dallas is a 39 year old male who presents to clinic today for the following health issues:  He did the referral to today discuss about his cough  Has been ongoing for the last 1 month at least  He had some viral upper respiratory tract infection in the last week of March  He was evaluated in the ER for possible coronavirus and this was negative  Since then he has been having a lot of cough  He also been having some wheezing  Also getting short of breath with exertion  Cough worse during the day and with exertion  He has been using his albuterol inhaler a lot which seems to help a little bit  He was diagnosed as asthma last year  Only on albuterol inhaler which he seldom uses up until now  HPI            Patient Active Problem List   Diagnosis     Vitamin D deficiency     Anxiety     Major " depressive disorder, single episode, moderate (H)     Gilbert syndrome     IBS (irritable bowel syndrome)     Past Surgical History:   Procedure Laterality Date     COLONOSCOPY  3 years ago    No concerns     ORTHOPEDIC SURGERY  04/18    Mensicus / ACL surgery       Social History     Tobacco Use     Smoking status: Never Smoker     Smokeless tobacco: Never Used   Substance Use Topics     Alcohol use: Yes     Comment: 2 - 3 drinks a week     Family History   Problem Relation Age of Onset     Hypertension Mother      Hyperlipidemia Mother      Depression Other         dad's side     Heart Disease Maternal Grandfather      Diabetes No family hx of      Coronary Artery Disease No family hx of      Cerebrovascular Disease No family hx of      Breast Cancer No family hx of      Colon Cancer No family hx of      Prostate Cancer No family hx of      Thyroid Disease No family hx of      Glaucoma No family hx of      Macular Degeneration No family hx of            Reviewed and updated as needed this visit by Provider         Review of Systems   ROS COMP: Constitutional, HEENT, cardiovascular, pulmonary, GI, , musculoskeletal, neuro, skin, endocrine and psych systems are negative, except as otherwise noted.       Objective   Reported vitals:  There were no vitals taken for this visit.   healthy, alert and no distress  PSYCH: Alert and oriented times 3; coherent speech, normal   rate and volume, able to articulate logical thoughts, able   to abstract reason, no tangential thoughts, no hallucinations   or delusions  His affect is normal  RESP: No cough, no audible wheezing, able to talk in full sentences  Remainder of exam unable to be completed due to telephone visits    Diagnostic Test Results:  Labs reviewed in Epic  none         Assessment/Plan:  1. Acute bronchitis with symptoms > 10 days  I think the cough is from the bronchitis caused by recent upper respiratory tract infection  I think is bronchi are very  sensitive  We will try a course of steroids and if this does not get matters back to baseline he will need an inhaler with a steroid like Symbicort  - predniSONE (DELTASONE) 20 MG tablet; Take 3 tabs by mouth daily x 3 days, then 2 tabs daily x 3 days, then 1 tab daily x 3 days, then 1/2 tab daily x 3 days.  Dispense: 20 tablet; Refill: 0    2. Mild intermittent asthma with exacerbation  Asthma exacerbated by bronchitis  Will try a course of steroids and if things do not improve after that he will need to be placed on Symbicort inhaler and some PFTs will be needed      No follow-ups on file.      Phone call duration:  7  minutes    Raffy Mendez MD

## 2020-05-01 NOTE — PATIENT INSTRUCTIONS
Patient Education     Asthma Medicines  Controllers and relievers  Controller medicines   Medicines that help control asthma and prevent symptoms are called controllers. They work over time--they will not relieve symptoms quickly. Some people take more than one controller.   Names of the controllers you take:   ________________________________________  ________________________________________  How do they work?  Controllers help prevent asthma attacks. They take down swelling, relax airway muscles and reduce mucus over time. This keeps your airways open. They also block your body's response to asthma triggers (things that cause asthma symptoms).  How do I take them?  Some controllers are taken by mouth. Others are breathed in through an inhaler.  Your doctor will tell you how to take your medicine. Be sure to take it every day, at the same times each day.  Reliever medicines   Medicines that relieve an asthma flare-up quickly are called relievers. Some people take more than one reliever.   Names of the relievers you use:   ________________________________________  ________________________________________  How do they work?  Relievers relax the muscles around the airways to open them up and make breathing easier. They bring fast relief from asthma symptoms.  How do I take them?  Relievers are breathed in through an inhaler or a nebulizer. Take them at the first sign of an asthma flare-up.  For informational purposes only. Not to replace the advice of your health care provider.   Copyright   2006 [employer]. All rights reserved. [employer] 117910 - REV 2/17.  For informational purposes only. Not to replace the advice of your health care provider.  Copyright   2018 [employer]. All rights reserved.

## 2020-05-02 ASSESSMENT — ASTHMA QUESTIONNAIRES: ACT_TOTALSCORE: 13

## 2020-05-18 ENCOUNTER — OFFICE VISIT (OUTPATIENT)
Dept: FAMILY MEDICINE | Facility: CLINIC | Age: 40
End: 2020-05-18
Payer: COMMERCIAL

## 2020-05-18 VITALS
OXYGEN SATURATION: 99 % | WEIGHT: 211 LBS | RESPIRATION RATE: 18 BRPM | HEART RATE: 108 BPM | DIASTOLIC BLOOD PRESSURE: 95 MMHG | SYSTOLIC BLOOD PRESSURE: 138 MMHG | BODY MASS INDEX: 27.96 KG/M2 | HEIGHT: 73 IN | TEMPERATURE: 98.3 F

## 2020-05-18 DIAGNOSIS — R03.0 ELEVATED BLOOD PRESSURE READING WITHOUT DIAGNOSIS OF HYPERTENSION: ICD-10-CM

## 2020-05-18 DIAGNOSIS — J30.1 SEASONAL ALLERGIC RHINITIS DUE TO POLLEN: Primary | ICD-10-CM

## 2020-05-18 DIAGNOSIS — F32.1 MODERATE SINGLE CURRENT EPISODE OF MAJOR DEPRESSIVE DISORDER (H): ICD-10-CM

## 2020-05-18 DIAGNOSIS — J45.901 MILD ASTHMA WITH ACUTE EXACERBATION, UNSPECIFIED WHETHER PERSISTENT: ICD-10-CM

## 2020-05-18 PROCEDURE — 99214 OFFICE O/P EST MOD 30 MIN: CPT | Performed by: FAMILY MEDICINE

## 2020-05-18 ASSESSMENT — MIFFLIN-ST. JEOR: SCORE: 1925.97

## 2020-05-18 ASSESSMENT — PAIN SCALES - GENERAL: PAINLEVEL: NO PAIN (0)

## 2020-05-18 NOTE — PROGRESS NOTES
Subjective     Rosendo Dallas is a 39 year old male who presents to clinic today for the following health issues:    HPI   Asthma Follow-Up    Was ACT completed today?    Yes    ACT Total Scores 5/18/2020   ACT TOTAL SCORE (Goal Greater than or Equal to 20) 12   In the past 12 months, how many times did you visit the emergency room for your asthma without being admitted to the hospital? 0   In the past 12 months, how many times were you hospitalized overnight because of your asthma? 0       How many days per week do you miss taking your asthma controller medication?  4    Please describe any recent triggers for your asthma: upper respiratory infections    Have you had any Emergency Room Visits, Urgent Care Visits, or Hospital Admissions since your last office visit?  No      How many servings of fruits and vegetables do you eat daily?  4 or more    On average, how many sweetened beverages do you drink each day (Examples: soda, juice, sweet tea, etc.  Do NOT count diet or artificially sweetened beverages)?   1    How many days per week do you exercise enough to make your heart beat faster? 6    How many minutes a day do you exercise enough to make your heart beat faster? 30 - 60    How many days per week do you miss taking your medication? 0    Treated for bronchitis with antibiotics, inhalers and prednisone. Still having rhinorrhea. Inhaler does not seem to be helping but symptoms over all are improving.     Patient Active Problem List   Diagnosis     Vitamin D deficiency     Anxiety     Major depressive disorder, single episode, moderate (H)     Gilbert syndrome     IBS (irritable bowel syndrome)     Past Surgical History:   Procedure Laterality Date     COLONOSCOPY  3 years ago    No concerns     ORTHOPEDIC SURGERY  04/18    Mensicus / ACL surgery       Social History     Tobacco Use     Smoking status: Never Smoker     Smokeless tobacco: Never Used   Substance Use Topics     Alcohol use: Yes     Comment: 2 - 3  drinks a week     Family History   Problem Relation Age of Onset     Hypertension Mother      Hyperlipidemia Mother      Depression Other         dad's side     Heart Disease Maternal Grandfather      Diabetes No family hx of      Coronary Artery Disease No family hx of      Cerebrovascular Disease No family hx of      Breast Cancer No family hx of      Colon Cancer No family hx of      Prostate Cancer No family hx of      Thyroid Disease No family hx of      Glaucoma No family hx of      Macular Degeneration No family hx of          Current Outpatient Medications   Medication Sig Dispense Refill     albuterol (PROAIR HFA) 108 (90 Base) MCG/ACT inhaler Inhale 1-2 puffs into the lungs every 4 hours as needed for shortness of breath / dyspnea or wheezing 1 Inhaler 5     omeprazole (PRILOSEC) 10 MG capsule Take by mouth 30-60 minutes before a meal. 60 capsule      Allergies   Allergen Reactions     Seasonal Allergies Itching     Recent Labs   Lab Test 09/30/19  0953 07/14/19  1326 09/16/18  1015 10/23/17  1054 07/12/17  0819  02/13/17  1148   A1C 5.2  --   --   --   --   --   --    LDL  --   --   --   --  125*  --   --    HDL  --   --   --   --  43  --   --    TRIG  --   --   --   --  79  --   --    ALT  --  45 37 33  --    < > 53   CR  --  0.83 0.91 0.87  --    < > 0.89   GFRESTIMATED  --  >90 >90 >90  --    < > >90  Non  GFR Calc     GFRESTBLACK  --  >90 >90 >90  --    < > >90   GFR Calc     POTASSIUM  --  3.9 4.2 4.2  --    < > 3.9   TSH  --   --   --  0.57  --   --  0.95    < > = values in this interval not displayed.      BP Readings from Last 3 Encounters:   05/18/20 (!) 138/95   04/06/20 (!) 153/102   02/05/20 (!) 127/92    Wt Readings from Last 3 Encounters:   05/18/20 95.7 kg (211 lb)   04/06/20 94.8 kg (209 lb)   02/05/20 94.2 kg (207 lb 9.6 oz)                    Hypertension Follow-up      Do you check your blood pressure regularly outside of the clinic? No     Are you  "following a low salt diet? Yes    Are your blood pressures ever more than 140 on the top number (systolic) OR more   than 90 on the bottom number (diastolic), for example 140/90? No    Depression and Anxiety Follow-Up    How are you doing with your depression since your last visit? No change    How are you doing with your anxiety since your last visit?  No change    Are you having other symptoms that might be associated with depression or anxiety? No    Have you had a significant life event? Health Concerns     Do you have any concerns with your use of alcohol or other drugs? No    Social History     Tobacco Use     Smoking status: Never Smoker     Smokeless tobacco: Never Used   Substance Use Topics     Alcohol use: Yes     Comment: 2 - 3 drinks a week     Drug use: No     PHQ 4/22/2019 9/30/2019 5/1/2020   PHQ-9 Total Score 3 2 3   Q9: Thoughts of better off dead/self-harm past 2 weeks Not at all Not at all Not at all     VIET-7 SCORE 10/13/2017 4/22/2019 9/30/2019   Total Score - - 5 (mild anxiety)   Total Score 5 4 5           Suicide Assessment Five-step Evaluation and Treatment (SAFE-T)    Reviewed and updated as needed this visit by Provider  Meds  Problems         Review of Systems   Constitutional, HEENT, cardiovascular, pulmonary, gi and gu systems are negative, except as otherwise noted.      Objective    BP (!) 138/95 (BP Location: Right arm, Patient Position: Sitting, Cuff Size: Adult Large)   Pulse 108   Temp 98.3  F (36.8  C) (Oral)   Resp 18   Ht 1.854 m (6' 1\")   Wt 95.7 kg (211 lb)   SpO2 99%   BMI 27.84 kg/m    Body mass index is 27.84 kg/m .  Physical Exam   GENERAL APPEARANCE: healthy, alert and no distress  EYES: Eyes grossly normal to inspection, PERRL and conjunctivae and sclerae normal  HENT: ear canals and TM's normal, nose and mouth without ulcers or lesions, oropharynx clear and oral mucous membranes moist  NECK: no adenopathy, no asymmetry, masses, or scars and thyroid normal to " "palpation  RESP: lungs clear to auscultation - no rales, rhonchi or wheezes  CV: regular rates and rhythm, normal S1 S2, no S3 or S4, no murmur, click or rub, no peripheral edema and peripheral pulses strong  ABDOMEN: soft, nontender, no hepatosplenomegaly, no masses and bowel sounds normal  MS: no musculoskeletal defects are noted and gait is age appropriate without ataxia  SKIN: no suspicious lesions or rashes  NEURO: Normal strength and tone, sensory exam grossly normal, mentation intact and speech normal  PSYCH: mentation appears normal and affect normal/bright     Diagnostic Test Results:  Labs reviewed in Epic  No results found for this or any previous visit (from the past 24 hour(s)).        Assessment & Plan       ICD-10-CM    1. Seasonal allergic rhinitis due to pollen  J30.1 Discussed ways to reduce allergins, filters, use over the counter antihistamine, flonase.    2. Elevated blood pressure reading without diagnosis of hypertension  R03.0 Return to clinic for blood pressure check and consider blood pressure medication    3. Moderate single current episode of major depressive disorder (H)  F32.1 Stable    4. Mild asthma with acute exacerbation, unspecified whether persistent  J45.901 May need to use a controller inhaler but does not seem to have any wheezing at this time and albuterol not doing anything. Formal testing may be indicated.         BMI:   Estimated body mass index is 27.84 kg/m  as calculated from the following:    Height as of this encounter: 1.854 m (6' 1\").    Weight as of this encounter: 95.7 kg (211 lb).           FUTURE LABS:       - Schedule fasting labs in 6 months  FUTURE APPOINTMENTS:       - Follow-up visit in 3 months or sooner if any questions or concerns.   Work on weight loss  Regular exercise  See Patient Instructions    Return in about 3 months (around 8/18/2020) for Physical Exam, Routine Visit, BP Recheck.    Lauren Lutz MD  [facility]"

## 2020-05-19 ASSESSMENT — ASTHMA QUESTIONNAIRES: ACT_TOTALSCORE: 12

## 2020-05-20 NOTE — PATIENT INSTRUCTIONS
Patient Education     Allergic Rhinitis  Allergic rhinitis is an allergic reaction that affects the nose, and often the eyes. It s often known as nasal allergies. Nasal allergies are often due to things in the environment that are breathed in. Depending what you are sensitive to, nasal allergies may occur only during certain seasons. Or they may occur year round. Common indoor allergens include house dust mites, mold, cockroaches, and pet dander. Outdoor allergens include pollen from trees, grasses, and weeds.   Symptoms include a drippy, stuffy, and itchy nose. They also include sneezing and red and itchy eyes. You may feel tired more often. Severe allergies may also affect your breathing and trigger a condition called asthma.   Tests can be done to see what allergens are affecting you. You may be referred to an allergy specialist for testing and further evaluation.  Home care  Your healthcare provider may prescribe medicines to help relieve allergy symptoms. These may include oral medicines, nasal sprays, or eye drops.  Ask your provider for advice on how to avoid substances that you are allergic to. Below are a few tips for each type of allergen.  Pet dander:    Do not have pets with fur and feathers.    If you can't avoid having a pet, keep it out of your bedroom and off upholstered furniture.  Pollen:    When pollen counts are high, keep windows of your car and home closed. If possible, use an air conditioner instead.    Wear a filter mask when mowing or doing yard work.  House dust mites:    Wash bedding every week in warm water and detergent and dry on a hot setting.    Cover the mattress, box spring, and pillows with allergy covers.     If possible, sleep in a room with no carpet, curtains, or upholstered furniture.  Cockroaches:    Store food in sealed containers.    Remove garbage from the home promptly.    Fix water leaks  Mold:    Keep humidity low by using a dehumidifier or air conditioner. Keep the  dehumidifier and air conditioner clean and free of mold.    Clean moldy areas with bleach and water.  In general:    Vacuum once or twice a week. If possible, use a vacuum with a high-efficiency particulate air (HEPA) filter.    Do not smoke. Avoid cigarette smoke. Cigarette smoke is an irritant that can make symptoms worse.  Follow-up care  Follow up as advised by the healthcare provider or our staff. If you were referred to an allergy specialist, make this appointment promptly.  When to seek medical advice  Call your healthcare provider right away if the following occur:    Coughing or wheezing    Fever of 100.4 F (38 C) or higher, or as directed by your healthcare provider    Raised red bumps (hives)    Continuing symptoms, new symptoms, or worsening symptoms  Call 911 if you have:    Trouble breathing    Severe swelling of the face or severe itching of the eyes or mouth  Date Last Reviewed: 3/1/2017    2648-4267 [employer]. [address] [place] [address]. All rights reserved. This information is not intended as a substitute for professional medical care. Always follow your healthcare professional's instructions.           Patient Education     Seasonal Allergy  Seasonal allergy is also called hay fever. It may occur after a person is exposed to pollens released from grasses, weeds, trees and shrubs. This type of allergy occurs during the spring and summer when the pollen contacts the lining of the nose, eyes, eyelids, sinuses and throat. This causes histamine to be released from the tissues. Histamine causes itching and swelling. This may produce a watery discharge from the eyes or nose. Violent sneezing, nasal congestion, post-nasal drip, itching of the eyes, nose, throat and mouth, scratchy throat, and dry cough may also occur.  Home care  Seasonal allergy cannot be cured, but symptoms can be reduced by these measures:    Stay away from or limit your time near the allergen as much as  you can:    ? Stay indoors on windy days of pollen season.   ? Keep windows and doors closed. Use air conditioning instead in your home and car. This filters the air.  ? Change air conditioner filters often.  ? Take a shower, wash your hair, and change clothes after being outdoors.  ? Put on a NIOSH-rated 95 filter mask when working outdoors. Before going outside, take your allergy medicine as advised by your healthcare provider.    Decongestant pills and sprays reduce tissue swelling and watery discharge. Overuse of nasal decongestant sprays may make symptoms worse. Do not use these more often than recommended. Sometimes you can experience a rebound effect (symptoms worsen), when stopping them. Talk to your healthcare provider or pharmacist about these medicines before taking them, especially if you have high blood pressure or heart problems.     Antihistamines block the release of histamine during the allergic response. They work better when taken before symptoms develop. Unless a prescription antihistamine was prescribed, you can take over-the-counter antihistamines that do not cause drowsiness.  Ask your pharmacist for suggestions.    Steroid nasal sprays or oral steroids may also be prescribed for more severe symptoms. These help to reduce the local inflammation that can add to the allergic response.    If you have asthma, pollen season may make your asthma symptoms worse. It is important that you use your asthma medicines as directed during this time to prevent or treat attacks. Some persons with asthma have asthma symptoms that get worse when they take antihistamines. This is due to the drying effect on the lungs. If you notice this, stop the antihistamines, drink extra fluids and notify your doctor.    If you have sinus congestion or drainage, a saline nasal rinse may give relief. A saline nasal rinse lessens the swelling and clears excess mucus. This allows sinuses to drain. Prepackaged kits are sold at  most drug stores. These contain pre-mixed salt packets and an irrigation device.  Follow-up care  Follow up with your healthcare provider, or as advised. If you have been referred to a specialist, make an appointment promptly.  When to seek medical advice  Call your healthcare provider right away for any of the following:    Facial, ear or sinus pain; colored drainage from the nose    Headaches    You have asthma and your asthma symptoms do not respond to the usual doses of your medicine    Cough with colored sputum (mucus)    Fever of 100.4 F (38 C) or higher, or as directed by the healthcare provider  Call 911  Call 911 if any of these occur:    Trouble breathing or swallowing, wheezing    Hoarse voice, trouble speaking, or drooling    Confusion    Very drowsy or trouble awakening    Fainting or loss of consciousness    Rapid heart rate, or weak pulse    Low blood pressure    Feeling of doom    Nausea, vomiting, abdominal pain, diarrhea    Vomiting blood, or large amounts of blood in stool    Seizure    Cold, moist, or pale (blue in color) skin  Date Last Reviewed: 5/1/2017 2000-2019 [employer]. [address] [place] [address]. All rights reserved. This information is not intended as a substitute for professional medical care. Always follow your healthcare professional's instructions.

## 2020-08-17 ENCOUNTER — TRANSFERRED RECORDS (OUTPATIENT)
Dept: HEALTH INFORMATION MANAGEMENT | Facility: CLINIC | Age: 40
End: 2020-08-17

## 2020-08-31 ENCOUNTER — TRANSFERRED RECORDS (OUTPATIENT)
Dept: HEALTH INFORMATION MANAGEMENT | Facility: CLINIC | Age: 40
End: 2020-08-31

## 2020-09-01 ENCOUNTER — TRANSFERRED RECORDS (OUTPATIENT)
Dept: HEALTH INFORMATION MANAGEMENT | Facility: CLINIC | Age: 40
End: 2020-09-01

## 2020-09-03 ENCOUNTER — TRANSFERRED RECORDS (OUTPATIENT)
Dept: HEALTH INFORMATION MANAGEMENT | Facility: CLINIC | Age: 40
End: 2020-09-03

## 2020-09-04 ENCOUNTER — APPOINTMENT (OUTPATIENT)
Dept: GENERAL RADIOLOGY | Facility: CLINIC | Age: 40
End: 2020-09-04
Attending: EMERGENCY MEDICINE
Payer: COMMERCIAL

## 2020-09-04 ENCOUNTER — HOSPITAL ENCOUNTER (EMERGENCY)
Facility: CLINIC | Age: 40
Discharge: HOME | End: 2020-09-04
Attending: EMERGENCY MEDICINE | Admitting: EMERGENCY MEDICINE
Payer: COMMERCIAL

## 2020-09-04 VITALS
BODY MASS INDEX: 26.77 KG/M2 | RESPIRATION RATE: 22 BRPM | HEART RATE: 99 BPM | SYSTOLIC BLOOD PRESSURE: 135 MMHG | WEIGHT: 202 LBS | TEMPERATURE: 97.7 F | HEIGHT: 73 IN | DIASTOLIC BLOOD PRESSURE: 106 MMHG | OXYGEN SATURATION: 99 %

## 2020-09-04 DIAGNOSIS — R06.09 DYSPNEA ON EXERTION: ICD-10-CM

## 2020-09-04 LAB
ANION GAP SERPL CALCULATED.3IONS-SCNC: 5 MMOL/L (ref 3–14)
BASOPHILS # BLD AUTO: 0 10E9/L (ref 0–0.2)
BASOPHILS NFR BLD AUTO: 0.4 %
BUN SERPL-MCNC: 14 MG/DL (ref 7–30)
CALCIUM SERPL-MCNC: 8.9 MG/DL (ref 8.5–10.1)
CHLORIDE SERPL-SCNC: 103 MMOL/L (ref 94–109)
CO2 SERPL-SCNC: 28 MMOL/L (ref 20–32)
CREAT SERPL-MCNC: 1.01 MG/DL (ref 0.66–1.25)
D DIMER PPP FEU-MCNC: <0.3 UG/ML FEU (ref 0–0.5)
DIFFERENTIAL METHOD BLD: NORMAL
EOSINOPHIL # BLD AUTO: 0.1 10E9/L (ref 0–0.7)
EOSINOPHIL NFR BLD AUTO: 0.9 %
ERYTHROCYTE [DISTWIDTH] IN BLOOD BY AUTOMATED COUNT: 11.7 % (ref 10–15)
GFR SERPL CREATININE-BSD FRML MDRD: >90 ML/MIN/{1.73_M2}
GLUCOSE SERPL-MCNC: 98 MG/DL (ref 70–99)
HCT VFR BLD AUTO: 46.4 % (ref 40–53)
HGB BLD-MCNC: 16.9 G/DL (ref 13.3–17.7)
IMM GRANULOCYTES # BLD: 0 10E9/L (ref 0–0.4)
IMM GRANULOCYTES NFR BLD: 0.2 %
INTERPRETATION ECG - MUSE: NORMAL
LYMPHOCYTES # BLD AUTO: 1.2 10E9/L (ref 0.8–5.3)
LYMPHOCYTES NFR BLD AUTO: 21.4 %
MCH RBC QN AUTO: 31.9 PG (ref 26.5–33)
MCHC RBC AUTO-ENTMCNC: 36.4 G/DL (ref 31.5–36.5)
MCV RBC AUTO: 88 FL (ref 78–100)
MONOCYTES # BLD AUTO: 0.2 10E9/L (ref 0–1.3)
MONOCYTES NFR BLD AUTO: 4.3 %
NEUTROPHILS # BLD AUTO: 4.1 10E9/L (ref 1.6–8.3)
NEUTROPHILS NFR BLD AUTO: 72.8 %
NRBC # BLD AUTO: 0 10*3/UL
NRBC BLD AUTO-RTO: 0 /100
PLATELET # BLD AUTO: 217 10E9/L (ref 150–450)
POTASSIUM SERPL-SCNC: 4.4 MMOL/L (ref 3.4–5.3)
RBC # BLD AUTO: 5.29 10E12/L (ref 4.4–5.9)
SODIUM SERPL-SCNC: 136 MMOL/L (ref 133–144)
TROPONIN I BLD-MCNC: 0.05 UG/L (ref 0–0.08)
TROPONIN I SERPL-MCNC: <0.015 UG/L (ref 0–0.04)
TROPONIN I SERPL-MCNC: <0.015 UG/L (ref 0–0.04)
WBC # BLD AUTO: 5.6 10E9/L (ref 4–11)

## 2020-09-04 PROCEDURE — U0003 INFECTIOUS AGENT DETECTION BY NUCLEIC ACID (DNA OR RNA); SEVERE ACUTE RESPIRATORY SYNDROME CORONAVIRUS 2 (SARS-COV-2) (CORONAVIRUS DISEASE [COVID-19]), AMPLIFIED PROBE TECHNIQUE, MAKING USE OF HIGH THROUGHPUT TECHNOLOGIES AS DESCRIBED BY CMS-2020-01-R: HCPCS | Performed by: EMERGENCY MEDICINE

## 2020-09-04 PROCEDURE — 85379 FIBRIN DEGRADATION QUANT: CPT | Performed by: EMERGENCY MEDICINE

## 2020-09-04 PROCEDURE — 93005 ELECTROCARDIOGRAM TRACING: CPT

## 2020-09-04 PROCEDURE — 85025 COMPLETE CBC W/AUTO DIFF WBC: CPT | Performed by: EMERGENCY MEDICINE

## 2020-09-04 PROCEDURE — 99285 EMERGENCY DEPT VISIT HI MDM: CPT | Mod: 25

## 2020-09-04 PROCEDURE — 84484 ASSAY OF TROPONIN QUANT: CPT | Performed by: EMERGENCY MEDICINE

## 2020-09-04 PROCEDURE — 71045 X-RAY EXAM CHEST 1 VIEW: CPT

## 2020-09-04 PROCEDURE — C9803 HOPD COVID-19 SPEC COLLECT: HCPCS

## 2020-09-04 PROCEDURE — 84484 ASSAY OF TROPONIN QUANT: CPT

## 2020-09-04 PROCEDURE — 80048 BASIC METABOLIC PNL TOTAL CA: CPT | Performed by: EMERGENCY MEDICINE

## 2020-09-04 ASSESSMENT — ENCOUNTER SYMPTOMS
MYALGIAS: 1
COUGH: 0
NAUSEA: 0
SHORTNESS OF BREATH: 1
CHEST TIGHTNESS: 1
FEVER: 0
BLOOD IN STOOL: 0

## 2020-09-04 ASSESSMENT — MIFFLIN-ST. JEOR: SCORE: 1885.15

## 2020-09-04 NOTE — ED PROVIDER NOTES
"  History     Chief Complaint:  Shortness of Breath; Chest Pain; and Chills    HPI   Rosendo Dallas is a 39 year old male who presents with shortness of breath. The patient has shortness of breath and muscle tightness that started 3 weeks ago. He has pain with deep breathing, and feels like there is pressure on his chest. He states his muscle tightness is intermittent and has gotten worse. He drinks about 3 times a week and does not use tobacco. He denies cough, fever, nausea, loss of taste or smell, blood in stool, or known sick contacts.    Cardiac Risk Factors   Sex: Male   Tobacco: Negative   Hypertension: Negative  Diabetes: Negative  Hyperlipidemia: Negative  Family History: Negative    PE/DVT Risk Factors   Personal History: Negative  Recent Travel: Negative   Recent Surgery/Hospitalization: Negative  Tobacco: Negative  Family History: Negative  Hormone Use: Negative   Cancer: Negative  Trauma: Negative       Allergies:  No Known Drug Allergies      Medications:    Albuterol inhaler  Prilosec    Past Medical History:    IBS  Anxiety  Depression   Vitamin D deficiency  Gilbert syndrome    Past Surgical History:    Mensicus/ ACL surgery    Family History:    Hypertension   Hyperlipidemia   Depression     Social History:  Smoking status: No  Alcohol use: Yes  Drug use: No  Patient presents alone.  PCP: [place]    Marital Status:        Review of Systems   Constitutional: Negative for fever.   Respiratory: Positive for chest tightness and shortness of breath. Negative for cough.    Gastrointestinal: Negative for blood in stool and nausea.   Musculoskeletal: Positive for myalgias.   All other systems reviewed and are negative.      Physical Exam     Patient Vitals for the past 24 hrs:   BP Temp Temp src Pulse Resp SpO2 Height Weight   09/04/20 1103 (!) 146/97 97.7  F (36.5  C) Temporal 94 20 99 % 1.854 m (6' 1\") 91.6 kg (202 lb)      Physical Exam  General: Alert, interactive in mild " distress  Head:  Scalp is atraumatic  Eyes:  The pupils are equal, round, and reactive to light    EOM's intact    No scleral icterus  ENT:      Nose:  The external nose is normal  Ears:  External ears are normal  Mouth/Throat: The oropharynx is normal    Mucus membranes are moist       Neck:  Normal range of motion.      There is no rigidity.    Trachea is in the midline         CV:  Regular rate and rhythm    No murmur   Resp:  Breath sounds are clear bilaterally    Non-labored, no retractions or accessory muscle use      GI:  Abdomen is soft, no distension, no tenderness.       MS:  Normal strength in all 4 extremities  Skin:  Warm and dry, No rash or lesions noted.  Neuro: Strength 5/5 x4.    Psych:  Awake. Alert.  Normal affect.      Appropriate interactions.    Emergency Department Course   ECG:  @ 1108  Indication: shortness of breath, chest pain   Vent. Rate 97 bpm. OR interval 176 ms. QRS duration 88 ms. QT/QTc 340/431 ms. P-R-T axis 47 36 29.   Normal sinus rhythm. Normal ECG.  No significant change when compared to previous ECG from 7/14/19   Read @ 1142 by Dr. Prieto.     Imaging:  Chest XR Port 1 View:  IMPRESSION: Heart size is normal. No pleural effusion, pneumothorax,  or abnormal area of consolidation.  Reading per radiology.     Radiographic findings were communicated with the patient who voiced understanding of the findings.    Laboratory:  CBC: WBC 5.6, HGB 16.9,     D-dimer: <0.3    1205 Troponin I: <0.015  1420 Troponin I: <0.015      BMP: WNL (Creatinine: 1.01)     Symptomatic COVID-19 Virus by PCR: In process     Emergency Department Course:  1146 Nursing notes and vitals reviewed. I performed an exam of the patient as documented above.     EKG was done, interpretation as above.    IV inserted. Blood drawn. This was sent to the lab for further testing, results above.    The patient was sent for a x-ray while in the emergency department, findings above.     1355 I rechecked the patient  and discussed the results of his workup thus far.     Findings and plan explained to the Patient. Patient discharged home with instructions regarding supportive care, medications, and reasons to return. The importance of close follow-up was reviewed.     I personally reviewed the laboratory and imaging results with the Patient and answered all related questions prior to discharge.      Impression & Plan    Covid-19  Rosendo Dallas was evaluated during a global COVID-19 pandemic, which necessitated consideration that the patient might be at risk for infection with the SARS-CoV-2 virus that causes COVID-19.   Applicable protocols for evaluation were followed during the patient's care.   COVID-19 was considered as part of the patient's evaluation. The plan for testing is:  a test was obtained during this visit.     Medical Decision Making:  Mr. Dallas was seen and evaluated. The above workup was undertaken and returned as unremarkable. Given his dyspnea on exertion I have ordered an outpatient stress echocardiogram. No additional signs of acute coronary syndrome are present and he is currently symptom free and has two negative troponins. I do not believe he needs hospitalization. I considered pulmonary embolism but with a negative d-dimer I think this is unlikely. Chest radiograph is unremarkable. No signs of pneumonia or pneumothorax. Certainly during the pandemic mild COVID-19 symptoms remained in the differential and a swab is pending. Patient is a agreement with plan, will follow up with his primary care provider, and will return if new symptoms develop. He was subsequently discharged.          Diagnosis:    ICD-10-CM    1. Dyspnea on exertion  R06.00 Discharge Order: F/U with Cardiac  [place]     Exercise Stress Echocardiogram       Disposition:  discharged to home    Discharge Medications:  New Prescriptions    No medications on file       Niurka Washington  9/4/2020    EMERGENCY DEPARTMENT    Scribe Disclosure:  I  Niurka Washington, am serving as a scribe at 11:46 AM on 9/4/2020 to document services personally performed by Isidro Prieto MD based on my observations and the provider's statements to me.         Isidro Prieto MD  09/04/20 [id number]

## 2020-09-04 NOTE — ED AVS SNAPSHOT
Emergency Department  [address] [place] [id number]  Phone:  [phone]  Fax:  [phone]                                    Rosendo Dallas   MRN: [id number]    Department:   Emergency Department   Date of Visit:  9/4/2020           After Visit Summary Signature Page    I have received my discharge instructions, and my questions have been answered. I have discussed any challenges I see with this plan with the nurse or doctor.    ..........................................................................................................................................  Patient/Patient Representative Signature      ..........................................................................................................................................  Patient Representative Print Name and Relationship to Patient    ..................................................               ................................................  Date                                   Time    ..........................................................................................................................................  Reviewed by Signature/Title    ...................................................              ..............................................  Date                                               Time          22EPIC Rev 08/18

## 2020-09-05 LAB
SARS-COV-2 RNA SPEC QL NAA+PROBE: NOT DETECTED
SPECIMEN SOURCE: NORMAL

## 2020-09-06 ENCOUNTER — APPOINTMENT (OUTPATIENT)
Dept: GENERAL RADIOLOGY | Facility: CLINIC | Age: 40
End: 2020-09-06
Attending: EMERGENCY MEDICINE
Payer: COMMERCIAL

## 2020-09-06 ENCOUNTER — HOSPITAL ENCOUNTER (EMERGENCY)
Facility: CLINIC | Age: 40
Discharge: HOME | End: 2020-09-06
Attending: INTERNAL MEDICINE | Admitting: INTERNAL MEDICINE
Payer: COMMERCIAL

## 2020-09-06 VITALS
HEART RATE: 91 BPM | SYSTOLIC BLOOD PRESSURE: 150 MMHG | DIASTOLIC BLOOD PRESSURE: 114 MMHG | OXYGEN SATURATION: 94 % | TEMPERATURE: 97.7 F | RESPIRATION RATE: 16 BRPM

## 2020-09-06 DIAGNOSIS — R07.89 CHEST TIGHTNESS: ICD-10-CM

## 2020-09-06 DIAGNOSIS — R25.3 MUSCLE TWITCHING: ICD-10-CM

## 2020-09-06 LAB
ALBUMIN SERPL-MCNC: 4.6 G/DL (ref 3.4–5)
ALP SERPL-CCNC: 65 U/L (ref 40–150)
ALT SERPL W P-5'-P-CCNC: 43 U/L (ref 0–70)
ANION GAP SERPL CALCULATED.3IONS-SCNC: 5 MMOL/L (ref 3–14)
AST SERPL W P-5'-P-CCNC: 36 U/L (ref 0–45)
BASOPHILS # BLD AUTO: 0.1 10E9/L (ref 0–0.2)
BASOPHILS NFR BLD AUTO: 0.8 %
BILIRUB SERPL-MCNC: 1.2 MG/DL (ref 0.2–1.3)
BUN SERPL-MCNC: 18 MG/DL (ref 7–30)
CALCIUM SERPL-MCNC: 9.1 MG/DL (ref 8.5–10.1)
CHLORIDE SERPL-SCNC: 104 MMOL/L (ref 94–109)
CK SERPL-CCNC: 572 U/L (ref 30–300)
CO2 SERPL-SCNC: 28 MMOL/L (ref 20–32)
CREAT SERPL-MCNC: 1.01 MG/DL (ref 0.66–1.25)
CRP SERPL-MCNC: <2.9 MG/L (ref 0–8)
D DIMER PPP FEU-MCNC: <0.3 UG/ML FEU (ref 0–0.5)
DIFFERENTIAL METHOD BLD: NORMAL
EOSINOPHIL # BLD AUTO: 0.2 10E9/L (ref 0–0.7)
EOSINOPHIL NFR BLD AUTO: 3.2 %
ERYTHROCYTE [DISTWIDTH] IN BLOOD BY AUTOMATED COUNT: 11.5 % (ref 10–15)
ERYTHROCYTE [SEDIMENTATION RATE] IN BLOOD BY WESTERGREN METHOD: 3 MM/H (ref 0–15)
GFR SERPL CREATININE-BSD FRML MDRD: >90 ML/MIN/{1.73_M2}
GLUCOSE SERPL-MCNC: 109 MG/DL (ref 70–99)
HCT VFR BLD AUTO: 47.5 % (ref 40–53)
HGB BLD-MCNC: 16.8 G/DL (ref 13.3–17.7)
IMM GRANULOCYTES # BLD: 0 10E9/L (ref 0–0.4)
IMM GRANULOCYTES NFR BLD: 0.2 %
INTERPRETATION ECG - MUSE: NORMAL
LIPASE SERPL-CCNC: 71 U/L (ref 73–393)
LYMPHOCYTES # BLD AUTO: 1.8 10E9/L (ref 0.8–5.3)
LYMPHOCYTES NFR BLD AUTO: 27.5 %
MCH RBC QN AUTO: 31.1 PG (ref 26.5–33)
MCHC RBC AUTO-ENTMCNC: 35.4 G/DL (ref 31.5–36.5)
MCV RBC AUTO: 88 FL (ref 78–100)
MONOCYTES # BLD AUTO: 0.5 10E9/L (ref 0–1.3)
MONOCYTES NFR BLD AUTO: 7.2 %
NEUTROPHILS # BLD AUTO: 4 10E9/L (ref 1.6–8.3)
NEUTROPHILS NFR BLD AUTO: 61.1 %
NRBC # BLD AUTO: 0 10*3/UL
NRBC BLD AUTO-RTO: 0 /100
PLATELET # BLD AUTO: 254 10E9/L (ref 150–450)
POTASSIUM SERPL-SCNC: 3.7 MMOL/L (ref 3.4–5.3)
PROT SERPL-MCNC: 7.7 G/DL (ref 6.8–8.8)
RBC # BLD AUTO: 5.4 10E12/L (ref 4.4–5.9)
SODIUM SERPL-SCNC: 137 MMOL/L (ref 133–144)
TROPONIN I SERPL-MCNC: <0.015 UG/L (ref 0–0.04)
TSH SERPL DL<=0.005 MIU/L-ACNC: 1.07 MU/L (ref 0.4–4)
WBC # BLD AUTO: 6.5 10E9/L (ref 4–11)

## 2020-09-06 PROCEDURE — 71046 X-RAY EXAM CHEST 2 VIEWS: CPT

## 2020-09-06 PROCEDURE — 93005 ELECTROCARDIOGRAM TRACING: CPT | Performed by: INTERNAL MEDICINE

## 2020-09-06 PROCEDURE — 99285 EMERGENCY DEPT VISIT HI MDM: CPT | Mod: 25 | Performed by: INTERNAL MEDICINE

## 2020-09-06 PROCEDURE — 85379 FIBRIN DEGRADATION QUANT: CPT | Performed by: EMERGENCY MEDICINE

## 2020-09-06 PROCEDURE — 85025 COMPLETE CBC W/AUTO DIFF WBC: CPT | Performed by: EMERGENCY MEDICINE

## 2020-09-06 PROCEDURE — 84443 ASSAY THYROID STIM HORMONE: CPT | Performed by: EMERGENCY MEDICINE

## 2020-09-06 PROCEDURE — 86255 FLUORESCENT ANTIBODY SCREEN: CPT | Performed by: INTERNAL MEDICINE

## 2020-09-06 PROCEDURE — 83690 ASSAY OF LIPASE: CPT | Performed by: EMERGENCY MEDICINE

## 2020-09-06 PROCEDURE — 93010 ELECTROCARDIOGRAM REPORT: CPT | Mod: Z6 | Performed by: INTERNAL MEDICINE

## 2020-09-06 PROCEDURE — 83516 IMMUNOASSAY NONANTIBODY: CPT | Performed by: INTERNAL MEDICINE

## 2020-09-06 PROCEDURE — 86140 C-REACTIVE PROTEIN: CPT | Performed by: EMERGENCY MEDICINE

## 2020-09-06 PROCEDURE — 86431 RHEUMATOID FACTOR QUANT: CPT | Performed by: INTERNAL MEDICINE

## 2020-09-06 PROCEDURE — 82550 ASSAY OF CK (CPK): CPT | Performed by: EMERGENCY MEDICINE

## 2020-09-06 PROCEDURE — 99284 EMERGENCY DEPT VISIT MOD MDM: CPT | Mod: 25 | Performed by: INTERNAL MEDICINE

## 2020-09-06 PROCEDURE — 84484 ASSAY OF TROPONIN QUANT: CPT | Performed by: EMERGENCY MEDICINE

## 2020-09-06 PROCEDURE — 80053 COMPREHEN METABOLIC PANEL: CPT | Performed by: EMERGENCY MEDICINE

## 2020-09-06 PROCEDURE — 86038 ANTINUCLEAR ANTIBODIES: CPT | Performed by: INTERNAL MEDICINE

## 2020-09-06 PROCEDURE — 85652 RBC SED RATE AUTOMATED: CPT | Performed by: EMERGENCY MEDICINE

## 2020-09-06 PROCEDURE — 83519 RIA NONANTIBODY: CPT | Performed by: INTERNAL MEDICINE

## 2020-09-06 PROCEDURE — 86256 FLUORESCENT ANTIBODY TITER: CPT | Performed by: INTERNAL MEDICINE

## 2020-09-06 ASSESSMENT — ENCOUNTER SYMPTOMS
MYALGIAS: 1
NECK PAIN: 1
PALPITATIONS: 0
SHORTNESS OF BREATH: 1
ADENOPATHY: 0
TROUBLE SWALLOWING: 0
VOMITING: 0
WHEEZING: 0
NAUSEA: 0
BACK PAIN: 0
ABDOMINAL PAIN: 0
NUMBNESS: 0
FEVER: 0
COUGH: 0
LIGHT-HEADEDNESS: 0
RHINORRHEA: 0
CHILLS: 0
DIFFICULTY URINATING: 0
WEAKNESS: 1
CHEST TIGHTNESS: 1
FATIGUE: 1
HEADACHES: 0
CONFUSION: 0

## 2020-09-06 NOTE — ED PROVIDER NOTES
ED Provider Note  [facility]      History     Chief Complaint   Patient presents with     Shortness of Breath     HPI  Rosendo Dallas is a 39 year old male who presents with 2 weeks of chest tightness fatigue, shortness of breath with exertion, and twitching of his upper and lower extremities. He has generalized fatigue. He has not been coughing or wheezing. He has no nasal congestion. He has no nausea, vomiting or abdominal pain. He has no leg pain or swelling. He was seen 2 days ago at another [facility] and had normal troponin x 2, CXR, general labs, d-dimer. He has had an exercise stress tests in the past which was normal in 4/19. He denies headache, visual changes. He feels weak in his arms. He has some upper posterior neck pain. He denies unusual stressors. He feels his dyspnea and fatigue have been worse in the past 2 days. Today when immersed in a swimming pool up to his neck, his chest felt tight. He had negative Covid-19 PCR 2 days ago. He did not do any heavy exercise or physical activity today.    Past Medical History:   Diagnosis Date     Head injury 15 + years ago    Likely had 1 to 2 concussions in  and college athletics     IBS (irritable colon syndrome)        Past Surgical History:   Procedure Laterality Date     COLONOSCOPY  3 years ago    No concerns     ORTHOPEDIC SURGERY  04/18    Mensicus / ACL surgery       Family History   Problem Relation Age of Onset     Hypertension Mother      Hyperlipidemia Mother      Depression Other         dad's side     Heart Disease Maternal Grandfather      Diabetes No family hx of      Coronary Artery Disease No family hx of      Cerebrovascular Disease No family hx of      Breast Cancer No family hx of      Colon Cancer No family hx of      Prostate Cancer No family hx of      Thyroid Disease No family hx of      Glaucoma No family hx of      Macular Degeneration No family hx of        Social History     Tobacco Use      Smoking status: Never Smoker     Smokeless tobacco: Never Used   Substance Use Topics     Alcohol use: Yes     Comment: 2 - 3 drinks a week            Review of Systems   Constitutional: Positive for fatigue. Negative for chills and fever.   HENT: Negative for congestion, rhinorrhea and trouble swallowing.    Eyes: Negative for visual disturbance.   Respiratory: Positive for chest tightness and shortness of breath. Negative for cough and wheezing.    Cardiovascular: Negative for chest pain, palpitations and leg swelling.   Gastrointestinal: Negative for abdominal pain, nausea and vomiting.   Genitourinary: Negative for decreased urine volume (he feels urine volume is increased) and difficulty urinating.   Musculoskeletal: Positive for myalgias and neck pain. Negative for back pain.   Skin: Negative for rash.   Neurological: Positive for weakness (arms associated with twitching). Negative for light-headedness, numbness and headaches.   Hematological: Negative for adenopathy.   Psychiatric/Behavioral: Negative for confusion.         Physical Exam   BP: (!) 145/94  Pulse: 97  Temp: 98.5  F (36.9  C)  Resp: 22  SpO2: 98 %  Physical Exam  Vitals signs and nursing note reviewed.   Constitutional:       General: He is not in acute distress.     Appearance: He is well-developed.   HENT:      Head: Normocephalic and atraumatic.      Mouth/Throat:      Mouth: Mucous membranes are moist.   Eyes:      General: No scleral icterus.     Extraocular Movements: Extraocular movements intact.      Pupils: Pupils are equal, round, and reactive to light.   Neck:      Musculoskeletal: Normal range of motion and neck supple. Muscular tenderness present. No pain with movement.   Cardiovascular:      Rate and Rhythm: Normal rate and regular rhythm.      Heart sounds: No murmur.   Pulmonary:      Effort: Pulmonary effort is normal.      Breath sounds: Normal breath sounds. No wheezing or rales.   Abdominal:      General: Abdomen is flat.       Palpations: Abdomen is soft.      Tenderness: There is no abdominal tenderness. There is no guarding.   Musculoskeletal:      Right lower leg: No edema.      Left lower leg: No edema.   Skin:     General: Skin is warm and dry.   Neurological:      General: No focal deficit present.      Mental Status: He is alert.      Cranial Nerves: No cranial nerve deficit.      Sensory: No sensory deficit.      Motor: No weakness.      Coordination: Coordination normal.      Gait: Gait normal.   Psychiatric:         Mood and Affect: Mood normal.         Behavior: Behavior normal.       ED Course      Procedures             EKG Interpretation:      Interpreted by NOEMI DRIVER MD  Time reviewed: 1757  Symptoms at time of EKG: None   Rhythm: normal sinus   Rate: Normal  Axis: Normal  Ectopy: none  Conduction: normal  ST Segments/ T Waves: T wave inversion III and aVF  Q Waves: none  Comparison to prior: Unchanged from 9/4/20    Clinical Impression: no acute changes         Results for orders placed or performed during the hospital encounter of 09/06/20   XR Chest 2 Views     Status: None    Narrative    Exam:  Chest X-ray 9/6/2020 6:02 PM    History: shortness of breath    Comparison: X-ray 9/4/2020    Findings: PA and lateral views of the chest. The trachea is midline.  Stable cardiomediastinal silhouette. No pleural effusion. The  pulmonary vessels are distinct. No focal pulmonary opacity. No  pneumothorax. The upper abdomen is unremarkable. No acute bone  findings. Multilevel degenerative changes of the thoracic spine.      Impression    Impression: Clear lungs    I have personally reviewed the examination and initial interpretation  and I agree with the findings.    PORTIA MARK MD   CBC with platelets differential     Status: None   Result Value Ref Range    WBC 6.5 4.0 - 11.0 10e9/L    RBC Count 5.40 4.4 - 5.9 10e12/L    Hemoglobin 16.8 13.3 - 17.7 g/dL    Hematocrit 47.5 40.0 - 53.0 %    MCV 88 78 - 100 fl    MCH  31.1 26.5 - 33.0 pg    MCHC 35.4 31.5 - 36.5 g/dL    RDW 11.5 10.0 - 15.0 %    Platelet Count 254 150 - 450 10e9/L    Diff Method Automated Method     % Neutrophils 61.1 %    % Lymphocytes 27.5 %    % Monocytes 7.2 %    % Eosinophils 3.2 %    % Basophils 0.8 %    % Immature Granulocytes 0.2 %    Nucleated RBCs 0 0 /100    Absolute Neutrophil 4.0 1.6 - 8.3 10e9/L    Absolute Lymphocytes 1.8 0.8 - 5.3 10e9/L    Absolute Monocytes 0.5 0.0 - 1.3 10e9/L    Absolute Eosinophils 0.2 0.0 - 0.7 10e9/L    Absolute Basophils 0.1 0.0 - 0.2 10e9/L    Abs Immature Granulocytes 0.0 0 - 0.4 10e9/L    Absolute Nucleated RBC 0.0    Comprehensive metabolic panel     Status: Abnormal   Result Value Ref Range    Sodium 137 133 - 144 mmol/L    Potassium 3.7 3.4 - 5.3 mmol/L    Chloride 104 94 - 109 mmol/L    Carbon Dioxide 28 20 - 32 mmol/L    Anion Gap 5 3 - 14 mmol/L    Glucose 109 (H) 70 - 99 mg/dL    Urea Nitrogen 18 7 - 30 mg/dL    Creatinine 1.01 0.66 - 1.25 mg/dL    GFR Estimate >90 >60 mL/min/[1.73_m2]    GFR Estimate If Black >90 >60 mL/min/[1.73_m2]    Calcium 9.1 8.5 - 10.1 mg/dL    Bilirubin Total 1.2 0.2 - 1.3 mg/dL    Albumin 4.6 3.4 - 5.0 g/dL    Protein Total 7.7 6.8 - 8.8 g/dL    Alkaline Phosphatase 65 40 - 150 U/L    ALT 43 0 - 70 U/L    AST 36 0 - 45 U/L   CK total     Status: Abnormal   Result Value Ref Range    CK Total 572 (H) 30 - 300 U/L   CRP inflammation     Status: None   Result Value Ref Range    CRP Inflammation <2.9 0.0 - 8.0 mg/L   D dimer quantitative     Status: None   Result Value Ref Range    D Dimer <0.3 0.0 - 0.50 ug/ml FEU   Lipase     Status: Abnormal   Result Value Ref Range    Lipase 71 (L) 73 - 393 U/L   Troponin I     Status: None   Result Value Ref Range    Troponin I ES <0.015 0.000 - 0.045 ug/L   TSH with free T4 reflex     Status: None   Result Value Ref Range    TSH 1.07 0.40 - 4.00 mU/L   Erythrocyte sedimentation rate auto     Status: None   Result Value Ref Range    Sed Rate 3 0 - 15  mm/h   EKG 12-lead, tracing only     Status: None (Preliminary result)   Result Value Ref Range    Interpretation ECG Click View Image link to view waveform and result      Medications - No data to display     Assessments & Plan (with Medical Decision Making)   Impression:  Young male presents with 2 weeks of fatigue, chest tightness, neck ache, sensation of weakness and easy fatigue in his upper extremities and muscle twitching in his arms and legs. He had EKG and troponin in the ED 2 days ago and again has normal EKG, troponin and d-dimer today. He does have mild elevation of his CPK. He has had no traumatic injuries. He is not on medications likely to cause myositis, though protonix can occasionally do this. There is no overt weakness on exam. His symptoms and labs may be suggestive of early immune myositis or potentially neuromuscular disease. He has normal TSH and normal CRP and ESR. Inflammatory myopathy seems less likely. He had negative Covid-19 PCR 2 days ago. With symptoms affecting arms>legs, ascending myelitis seems less likely. He has normal potassium. Hyperkalemia periodic paralysis is unlikely, but the normokalemic variety is a consideration. I have sent JERRY, RF and myasthenia panel. Neurology will be consulted.     Neurology recommends repeat CPK in 1-2 weeks, outpatient EMG and follow up in Neurology clinic. He has no overt weakness or respiratory compromise. He does have some visible muscle twitching.    I will also refer him to cardiology clinic for another exercise stress echocardiogram. The likelihood of ACS seems relatively low with normal ED evaluation 2 days ago and again today, but warrants a repeat stress test.     I have reviewed the nursing notes. I have reviewed the findings, diagnosis, plan and need for follow up with the patient.    New Prescriptions    No medications on file       Final diagnoses:   Chest tightness   Muscle twitching       --  Nba Renteria  [facility], [place],  EMERGENCY DEPARTMENT  9/6/2020     Nba Renteria MD  09/06/20 [id number]

## 2020-09-06 NOTE — ED AVS SNAPSHOT
[facility], [place], Emergency Department  [address] [place] [id number]  Phone:  [phone]                                    Rosendo Dallas   MRN: [id number]    Department:  [facility], Emergency Department   Date of Visit:  9/6/2020           After Visit Summary Signature Page    I have received my discharge instructions, and my questions have been answered. I have discussed any challenges I see with this plan with the nurse or doctor.    ..........................................................................................................................................  Patient/Patient Representative Signature      ..........................................................................................................................................  Patient Representative Print Name and Relationship to Patient    ..................................................               ................................................  Date                                   Time    ..........................................................................................................................................  Reviewed by Signature/Title    ...................................................              ..............................................  Date                                               Time          22EPIC Rev 08/18

## 2020-09-06 NOTE — ED TRIAGE NOTES
Pt presents ambulatory to triage. Pt states he has had worsening shortness of breath and fatigue for the past 2wks. Pt states he has a hx of allergies and asthma. Pt was seen at [facility] ER on Friday and they were not able to determine the cause of his sx. Pt states since going to the ER 2 days ago, sx have worsened.    Pt complains today of increased shortness of breath and bilateral tingling/arm pain/muscle twitching. Pt's lungs sound clear in triage. Pt is VSS. Pt denies COVID exposure and other COVID screening questions.

## 2020-09-07 NOTE — CONSULTS
[facility]  Neurology Consultation    Patient Name:  Rosendo Dallas  MRN:  [id number]    :  1980  Date of Service:  2020  Primary care provider:  [facility]      Neurology consultation service was asked to see Rosendo Dallas to evaluate new onset muscle tightness, chest tightness and fatigue with elevated CK.    History of Present Illness:   39 year old male h/o IBS, anxiety, depression, Gilbert syndrome, asthma who presents with 2 weeks history of shortness of breath, muscle tightness, chest tightness, muscle twitches and fatigue.     He noticed that his energy is low and get fatigue easily with moderate exercise. Normally he walks 2-3 miles daily without issues. Now he feels fatigue, muscle tightness and cramps, twitches of the muscles of the arms, calves, thighs and small muscles of the hands. He also feels his chest is tight and he is short of breath. He denies sensory changes other than tingling in the 4 lateral fingers bilaterally.      He was seen in the ED in [facility] for the same symptoms 2 days ago and symptoms have been worsening since then. EKg in [facility] and today are normal. Troponin normal, CK is slightly elevated (he was swimming today), TSH, CRP, ESR has been normal. CMP and CBC normal. COVID 19 is negative. D dimer: negative. CXR negative.      He denies headache lately, urinary incontinence or retention, denies change of the urine color, swallowing or chewing problems, blurry vision or facial muscle or neck muscles weakness or neck pain. He denies weakness or balance issues. He denies muscle pain ro tenderness, or worsening of the symptoms toward the end of the day, or change of his voice.  No medications changes. He is on omeprazole and Flonase nasal spray.      ROS  A 10-point ROS was performed as per HPI.       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     IBS (irritable colon syndrome)      Past Surgical History:   Procedure Laterality Date     COLONOSCOPY  3 years ago    No concerns     ORTHOPEDIC SURGERY  04/18    Mensicus / ACL surgery       Medications   - Omeprazole  - Albuterol  - Flonase    Allergies  Allergies   Allergen Reactions     Seasonal Allergies Itching       Social History  Social History     Tobacco Use     Smoking status: Never Smoker     Smokeless tobacco: Never Used   Substance Use Topics     Alcohol use: Yes     Comment: 2 - 3 drinks a week       Family History    Family History   Problem Relation Age of Onset     Hypertension Mother      Hyperlipidemia Mother      Depression Other         dad's side     Heart Disease Maternal Grandfather      Diabetes No family hx of      Coronary Artery Disease No family hx of      Cerebrovascular Disease No family hx of      Breast Cancer No family hx of      Colon Cancer No family hx of      Prostate Cancer No family hx of      Thyroid Disease No family hx of      Glaucoma No family hx of      Macular Degeneration No family hx of          Physical Examination   Vitals: BP (!) 154/102   Pulse 75   Temp 97.7  F (36.5  C) (Oral)   Resp 16   SpO2 99%   General: Adult male patient, lying in bed, NAD  HEENT: NC/AT, no icterus, op pink and moist  Cardiac: RRR  Chest: non-labored on RA  Abdomen: Soft, non tender  Extremities: Warm, no edema  Skin: No rash or lesion   Psych: Mood and affect congruent  Neuro:  Mental status: Awake, alert, attentive, oriented to self, time, place, and circumstance. Language is fluent and coherent with intact comprehension of complex commands, naming and repetition.  Cranial nerves: VFF, PERRL, conjugate gaze, EOMI, facial sensation intact, face symmetric, shoulder shrug strong, tongue/uvula midline, no dysarthria. No tongue fasciculations   Motor: Normal bulk and tone. Fasciculations seen in the triceps and biceps bilaterally and interossei muscles.   5/5 strength in 4/4 extremities.   Reflexes: reflexes 2+ biceps, brachioradialis, triceps, patellae. Areflexia in the achilles bilaterally.  Sensory: Intact to light touch  Coordination: FNF and HS without ataxia or dysmetria.   Gait: Normal width, stride length, turn, and arm swing. Station normal.     Investigations   CXR: normal  MRI brain with and without contrast 2/2020 for headache: unremarkable  CMP, CBC, TSH, Trop, ESR, CRP were normal   (after mild exercise today).     Impression  39 year old male h/o IBS, anxiety, depression, Gilbert syndrome, asthma who presents with 2 weeks history of shortness of breath, muscle tightness, chest tightness, muscle twitches and fatigue. The symptoms are non specific and can be seen in myopathies specially metabolic myopathies given the cramps, and muscle tightness after moderate exercise but there is no change in the urine color. The diffuse fasciculations and cramps and fatigue can be seen in the motor neuron disease specially ALS but there is no tongue fasciculations or muscle weakness or atrophy or changes in the reflexes. No medication changes recently. Electrolytes are normal, so not due to electrolyte abnormalities. The cramps and fasciculations also can be seen in the cramp- fasciculation syndrome which is a rare, benign condition due to nerve hyperexcitability. Tingling and paraesthesia can be seen in this syndrome. The cramps can be treated by Gabapentin and anticonvulsant.      Recommendations  -Repeat CK in 1-2 weeks with no exercise for 2-3 days prior to the test  - Consider adding Gabapentin if the cramps are bothersome  - EMG test  - follow up the Ach R Ab test done in the ED   - Follow up in the neurology clinic in 1 month    Thank you for involving Neurology in the care of Rosendo Dallas.  Please do not hesitate to call with questions/concerns (consult pager [phone]).      Patient was seen and discussed with Dr. Monique Pabon.     Gayle Carty,  MD  Neurology PGY-3  [phone]

## 2020-09-07 NOTE — DISCHARGE INSTRUCTIONS
Follow up with your doctor for a repeat CPK muscle enzyme level in 1-2 weeks.  You will be called to schedule an exercise stress echocardiogram in cardiology clinic.  You will be called to schedule an electromyogram test to evaluate the muscle twitching.  Follow up in the Neurology clinic in 1 month.  Please make an appointment to follow up with Neurology Clinic (phone: [phone]) in 30 days.

## 2020-09-08 LAB
ACHR BIND AB SER-SCNC: 0 NMOL/L (ref 0–0.4)
ACHR BLOCK AB/ACHR TOTAL SFR SER: 3 % (ref 0–26)
ANA SER QL IF: NEGATIVE

## 2020-09-09 LAB
ACHR MOD AB/ACHR TOTAL SFR SER: 15 %
RHEUMATOID FACT SER NEPH-ACNC: <7 IU/ML (ref 0–20)
STRIA MUS IGG SER QL IF: DETECTED
STRIA MUS IGG TITR SER IF: ABNORMAL {TITER}

## 2020-09-10 ENCOUNTER — OFFICE VISIT (OUTPATIENT)
Dept: FAMILY MEDICINE | Facility: CLINIC | Age: 40
End: 2020-09-10
Payer: COMMERCIAL

## 2020-09-10 VITALS
WEIGHT: 210.2 LBS | TEMPERATURE: 97.5 F | HEIGHT: 73 IN | SYSTOLIC BLOOD PRESSURE: 118 MMHG | BODY MASS INDEX: 27.86 KG/M2 | DIASTOLIC BLOOD PRESSURE: 90 MMHG | OXYGEN SATURATION: 97 % | HEART RATE: 87 BPM

## 2020-09-10 DIAGNOSIS — R06.02 SOB (SHORTNESS OF BREATH): Primary | ICD-10-CM

## 2020-09-10 DIAGNOSIS — M60.9 MYOSITIS, UNSPECIFIED MYOSITIS TYPE, UNSPECIFIED SITE: ICD-10-CM

## 2020-09-10 PROCEDURE — 99214 OFFICE O/P EST MOD 30 MIN: CPT | Performed by: FAMILY MEDICINE

## 2020-09-10 RX ORDER — FLUTICASONE PROPIONATE 50 MCG
1 SPRAY, SUSPENSION (ML) NASAL DAILY
COMMUNITY

## 2020-09-10 ASSESSMENT — MIFFLIN-ST. JEOR: SCORE: 1922.34

## 2020-09-10 NOTE — PROGRESS NOTES
"Subjective     Rosendo Dallas is a 39 year old male who presents to clinic today for the following health issues:    HPI       ED/UC Followup:    Facility:  [facility]  Date of visit: 9/6/20  Reason for visit: Chest tightness, Muscle spasms & twitching  Current Status: He does feel a bit better, but still has some shortness of breath & muscle twitching         Review of Systems   Constitutional, HEENT, cardiovascular, pulmonary, gi and gu systems are negative, except as otherwise noted.      Objective    BP (!) 118/90 (BP Location: Left arm, Patient Position: Sitting, Cuff Size: Adult Large)   Pulse 87   Temp 97.5  F (36.4  C) (Tympanic)   Ht 1.854 m (6' 1\")   Wt 95.3 kg (210 lb 3.2 oz)   SpO2 97%   BMI 27.73 kg/m    Body mass index is 27.73 kg/m .  Physical Exam   GENERAL: healthy, alert and no distress  NECK: no adenopathy, no asymmetry, masses, or scars and thyroid normal to palpation  RESP: lungs clear to auscultation - no rales, rhonchi or wheezes  CV: regular rate and rhythm, normal S1 S2, no S3 or S4, no murmur, click or rub, no peripheral edema and peripheral pulses strong  ABDOMEN: soft, nontender, no hepatosplenomegaly, no masses and bowel sounds normal  MS: no gross musculoskeletal defects noted, no edema            Assessment & Plan     SOB (shortness of breath)  Has been gradually worsening,   Was seen at ER for muscle spasm, and found elevated CK and Striated muscle Ig, did EMG and came back normal, has schedule doing echocardiogram with cardiology soon   However, his SOB has been worsening with pattern of worsening further at afternoon to evening time frame  Not smoking nor h/o asthma and COPD, will have him to see pulmonology for further evaluation   - PULMONARY MEDICINE REFERRAL    Myositis, unspecified myositis type, unspecified site  Mentioned above  Will have him to see neurology and pulmonology per concerning out of recent ER lab finding,  He has no double vision nor other ocular sx, " will keep monitoring closely and consider referral to ophthalmology if indicated   - PULMONARY MEDICINE REFERRAL       FUTURE APPOINTMENTS:       - Follow-up visit in 3 months as needed     No follow-ups on file.    Thomas Barrios MD  [facility]

## 2020-09-15 ENCOUNTER — TELEPHONE (OUTPATIENT)
Dept: CARDIOLOGY | Facility: CLINIC | Age: 40
End: 2020-09-15

## 2020-09-16 ENCOUNTER — TELEPHONE (OUTPATIENT)
Dept: PULMONOLOGY | Facility: CLINIC | Age: 40
End: 2020-09-16

## 2020-09-16 ENCOUNTER — TRANSFERRED RECORDS (OUTPATIENT)
Dept: HEALTH INFORMATION MANAGEMENT | Facility: CLINIC | Age: 40
End: 2020-09-16

## 2020-09-16 NOTE — TELEPHONE ENCOUNTER
[facility] Call Center    Phone Message    May a detailed message be left on voicemail: yes     Reason for Call: The patient has a new referral for shortness of breath.  Advised the Care Team would reach out to schedule once the Provider's schedules are open.  Thank you.     Action Taken: Message routed to:  Adult Clinics: Pulmonology p [phone]    Travel Screening: Not Applicable

## 2020-09-16 NOTE — TELEPHONE ENCOUNTER
Patient has been added to the [place] pulmonary wait list for future pulmonary consult.    Lisbet Baptiste LPN    Rheumatology / Pulmonology  [facility]

## 2020-09-18 DIAGNOSIS — R06.00 DYSPNEA: Primary | ICD-10-CM

## 2020-09-21 NOTE — TELEPHONE ENCOUNTER
RECORDS RECEIVED FROM: internal/CE   DATE RECEIVED: 10.20.20   NOTES STATUS DETAILS   OFFICE NOTE from referring provider internal  Denis Thomas S   OFFICE NOTE from other specialist internal  5.18.20 Bolivar BARNETT  4.6.20 Miguel Li R  9.30.20 Ruthy Tran   4.22.19, Ralf   2.25.19, Vocal A   DISCHARGE SUMMARY from hospital na    DISCHARGE REPORT from the ER internal  9.6.20 mamta ORDONEZ  9.4.20 Trigger B     MEDICATION LIST internal     IMAGING  (NEED IMAGES AND REPORTS)     CT SCAN na    CHEST XRAY (CXR) CE/internal  [facility]- 6.17.20   internal 9.6.20, 9.4.20, 4.6.20, 2.25.20,    TESTS     PULMONARY FUNCTION TESTING (PFT) Scheduled  Scheduled 10.20.20        Action 9.21.20 sv   Action Taken Image request sent to [facility] for   [facility]- CXR- 6.17.20 --- received----

## 2020-09-30 ENCOUNTER — HOSPITAL ENCOUNTER (OUTPATIENT)
Dept: CARDIOLOGY | Facility: CLINIC | Age: 40
Discharge: HOME | End: 2020-09-30
Attending: EMERGENCY MEDICINE | Admitting: EMERGENCY MEDICINE
Payer: COMMERCIAL

## 2020-09-30 DIAGNOSIS — R06.09 DYSPNEA ON EXERTION: ICD-10-CM

## 2020-09-30 PROCEDURE — 93325 DOPPLER ECHO COLOR FLOW MAPG: CPT | Mod: 26 | Performed by: INTERNAL MEDICINE

## 2020-09-30 PROCEDURE — 93018 CV STRESS TEST I&R ONLY: CPT | Performed by: INTERNAL MEDICINE

## 2020-09-30 PROCEDURE — 93350 STRESS TTE ONLY: CPT | Mod: 26 | Performed by: INTERNAL MEDICINE

## 2020-09-30 PROCEDURE — 93016 CV STRESS TEST SUPVJ ONLY: CPT | Performed by: INTERNAL MEDICINE

## 2020-09-30 PROCEDURE — 93321 DOPPLER ECHO F-UP/LMTD STD: CPT | Mod: 26 | Performed by: INTERNAL MEDICINE

## 2020-09-30 PROCEDURE — 93350 STRESS TTE ONLY: CPT | Mod: TC

## 2020-10-06 ASSESSMENT — ENCOUNTER SYMPTOMS
STIFFNESS: 1
SHORTNESS OF BREATH: 1
INSOMNIA: 1
MYALGIAS: 1
CHILLS: 1
NERVOUS/ANXIOUS: 1

## 2020-10-07 ENCOUNTER — OFFICE VISIT (OUTPATIENT)
Dept: NEUROLOGY | Facility: CLINIC | Age: 40
End: 2020-10-07
Attending: STUDENT IN AN ORGANIZED HEALTH CARE EDUCATION/TRAINING PROGRAM
Payer: COMMERCIAL

## 2020-10-07 VITALS
DIASTOLIC BLOOD PRESSURE: 88 MMHG | WEIGHT: 208 LBS | SYSTOLIC BLOOD PRESSURE: 137 MMHG | BODY MASS INDEX: 27.57 KG/M2 | HEART RATE: 85 BPM | HEIGHT: 73 IN

## 2020-10-07 DIAGNOSIS — M79.18 MYOFASCIAL PAIN: Primary | ICD-10-CM

## 2020-10-07 DIAGNOSIS — M79.18 MYOFASCIAL PAIN: ICD-10-CM

## 2020-10-07 LAB — CK SERPL-CCNC: 141 U/L (ref 30–300)

## 2020-10-07 PROCEDURE — 86235 NUCLEAR ANTIGEN ANTIBODY: CPT | Mod: 90 | Performed by: PATHOLOGY

## 2020-10-07 PROCEDURE — 99215 OFFICE O/P EST HI 40 MIN: CPT | Performed by: PSYCHIATRY & NEUROLOGY

## 2020-10-07 PROCEDURE — 82085 ASSAY OF ALDOLASE: CPT | Mod: 90 | Performed by: PATHOLOGY

## 2020-10-07 PROCEDURE — 36415 COLL VENOUS BLD VENIPUNCTURE: CPT | Mod: 90 | Performed by: PATHOLOGY

## 2020-10-07 PROCEDURE — 83516 IMMUNOASSAY NONANTIBODY: CPT | Mod: 90 | Performed by: PATHOLOGY

## 2020-10-07 PROCEDURE — 82550 ASSAY OF CK (CPK): CPT | Performed by: PATHOLOGY

## 2020-10-07 PROCEDURE — 83519 RIA NONANTIBODY: CPT | Mod: 90 | Performed by: PATHOLOGY

## 2020-10-07 PROCEDURE — 99000 SPECIMEN HANDLING OFFICE-LAB: CPT | Performed by: PATHOLOGY

## 2020-10-07 PROCEDURE — 86255 FLUORESCENT ANTIBODY SCREEN: CPT | Mod: 90 | Performed by: PATHOLOGY

## 2020-10-07 ASSESSMENT — PAIN SCALES - GENERAL: PAINLEVEL: NO PAIN (0)

## 2020-10-07 ASSESSMENT — MIFFLIN-ST. JEOR: SCORE: 1912.36

## 2020-10-07 NOTE — LETTER
10/7/2020       RE: Rosendo Dallas  [address]  [place] [address]     Dear Colleague,    Thank you for referring your patient, Rosendo Dallas, to the [facility] [place] at [facility]. Please see a copy of my visit note below.    [facility] Neurology Follow Up    Rosendo Dallas MRN# [id number]   Age: 39 year old YOB: 1980     Requesting physician: Gayle Fontenot  [facility]     Reason for Consultation: muscle twitching, fasciculations      History of Presenting Symptoms:   Rosendo Dallas is a 39 year old male who presents today for evaluation of muscle twitching.  The patient has been see in the past through [facility] with Dr. Johnston on 9/16/2020 for muscle twitching.  The patient has a pertinent medical history for IBS, Vitamin D deficiency, MDD with anxiety.  In review of his chart, the patient has reported chest tightness, fatigue, muscle soreness, SOB and muscle fasciculations during the summer of 2020.  He reported that muscle fasciculations of the chest, biceps, triceps and calf muscles were his primary concern.  He was seen in the [facility] ER. with testing postiive for CK of 572 and striated muscle antibody elevation (1:40).  Other testing (Rh, KYRA, ESR, CRP, TSH, CMP, CBC) were unremarkable.  EMG on 9/9/2020 of upper and lower extremities were reported as unremarkable.  Exam at the time of visit was not suggestive for any major neurological condition, and the plan going forward was to repeat CK, aldolase, alk phos as well as consider repeat EMG or obtain muslce biopsy.    The patient was seen 9/7/2020 for inpatient neurology consultation.  Overall impression was for non-specific symptoms possible consistent with a myopathic process. Recommendations were to follow up with neurology, consider gabapentin for cramps, and possibly have an EMG with repeat Ach R Helen.    The patient that his symptoms primarily  arise in the AM when getting up, or in the evening when going to bed.  When active, things are better.  He does think he gets fatigued easily, or more-so than he should.  His muscle twitching is 100% late evening or early morning.  Physical therapy has been somewhat helpful, and feels good to some degree.  He hasn't noted a major improvement in strength with the PT though.   He has no shooting pain down his arms or legs. There is no bowel, bladder involvement.  There is no gynecomastia, excessive drooling, diplopia, chewing weakness, change in voice, choking events noted.  The patient is a , and has been doing thins for 10+ years. He reports no major change in mood, sleep, or tolerance to hot and cold temperatures.    The patient is recommended to have L-C5-6 transforaminal TOLU based on his exam and imaging.  This recommendation came from [facility] on 9/4/2020.  He has not done this, and is trying to have PT/conservative medical management before committing to the procedure.  The patient played football, but otherwise has no major traumas to his neck or back.  He has had normal development with no major weakness, regressions in walking, or atypical pain issues.  He has no issues with urination or discoloration with urine.  He has no rashes on his body or near affected muscles.      Past Medical History:     Patient Active Problem List   Diagnosis     Vitamin D deficiency     Anxiety     Major depressive disorder, single episode, moderate (H)     Gilbert syndrome     IBS (irritable bowel syndrome)     Past Medical History:   Diagnosis Date     Head injury 15 + years ago    Likely had 1 to 2 concussions in HS and college athletics     IBS (irritable colon syndrome)       Past Surgical History:     Past Surgical History:   Procedure Laterality Date     COLONOSCOPY  3 years ago    No concerns     ORTHOPEDIC SURGERY  04/18    Mensicus / ACL surgery      Social History:     Tobacco Use     Smoking  "status: Never Smoker     Smokeless tobacco: Never Used   Substance Use Topics     Alcohol use: Yes     Comment: 2 - 3 drinks a week     Drug use: No      Family History:     Family History   Problem Relation Age of Onset     Hypertension Mother      Hyperlipidemia Mother      Depression Other         dad's side     Heart Disease Maternal Grandfather      Diabetes No family hx of      Coronary Artery Disease No family hx of      Cerebrovascular Disease No family hx of      Breast Cancer No family hx of      Colon Cancer No family hx of      Prostate Cancer No family hx of      Thyroid Disease No family hx of      Glaucoma No family hx of      Macular Degeneration No family hx of       Medications:     Current Outpatient Medications   Medication Sig     fluticasone (FLONASE) 50 MCG/ACT nasal spray Spray 1 spray into both nostrils daily     omeprazole (PRILOSEC) 10 MG capsule Take by mouth 30-60 minutes before a meal.      Allergies:     Allergies   Allergen Reactions     Seasonal Allergies Itching      Review of Systems:   A comprehensive 10 point review of systems (constitutional, ENT, cardiac, peripheral vascular, lymphatic, respiratory, GI, , Musculoskeletal, skin, Neurological) was performed and found to be negative except as described in this note.      Physical Exam:   Vitals: /88 (BP Location: Right arm, Patient Position: Sitting, Cuff Size: Adult Regular)   Pulse 85   Ht 1.854 m (6' 1\")   Wt 94.3 kg (208 lb)   BMI 27.44 kg/m     General: Seated comfortably in no acute distress.  HEENT: Neck supple with normal range of motion. No paracervical muscle tenderness or tightness.  Optic discs sharp and vasculature normal on funduscopic exam.   Heart: Regular rate  Lungs: breathing comfortably  GI: Non tender  Extremities: no edema  Skin: No rashes  Neurologic:     Mental Status: Fully alert, attentive and oriented. Speech clear and fluent, no paraphasic errors.     Cranial Nerves: Visual fields intact. " PERRL. EOMI with normal smooth pursuit. Facial sensation intact/symmetric. Facial movements symmetric. Hearing not formally tested but intact to conversation. Palate elevation symmetric, uvula midline. No dysarthria. Shoulder shrug strong bilaterally. Tongue protrusion midline.     Motor: No tremors or other abnormal movements observed. Muscle tone normal throughout. No pronator drift. Normal/symmetric rapid finger tapping. Strength 5/5 throughout upper and lower extremities.     Deep Tendon Reflexes: 2+/symmetric throughout upper and lower extremities. No clonus. Toes downgoing bilaterally.     Sensory: Intact/symmetric to light touch, pinprick, temperature, vibration and proprioception throughout upper and lower extremities. Negative Romberg.      Coordination: Finger-nose-finger and heel-shin intact without dysmetria. Rapid alternating movements intact/symmetric with normal speed and rhythm.     Gait: Normal, steady casual gait. Able to walk on toes, heels and tandem without difficulty.         Data: Pertinent prior to visit   Imaging:  MRI brain: 2/6/2020  Unchanged. Normal MRI appearance. Rightward nasal septum deviation.    MRI cervical spine: 9/2/2020  Severe left C5-6 and bilateral C6-7 degenerative foraminal stenosis  Moderate severe left C4-5 degenerative foraminal stenosis  Mild central canal stenosis C6-7 with no cord compression  Bulge and osteophyte and cord abutment C3-4 and C4-5         Assessment and Plan:   Assessment:  Muscle fatigue with elevated CK and cervical spinal stenosis/cord abutment from osteophyte at C3-4-5, and severe left C5-6/Bilatearl C6-7 degenerative foraminal stenosis    The patient has no discernable weakness on exam, nor any sensory loss, but does have diminished reflexes on his right patellar as well as right biceps.  His exam findings don't necessarily match that of his described myotomes and dermatomal symptoms, nor his imaging to a great degree.  I am not sure as to why he  had elevated CK as well as subtly high AChR carmelo + test, and these tests should be repeated along with inflammatory myopathy panel.  If these tests are normal, his symptoms are likely a combination of his cervical spine image findings as well as aging.  Further treatment with TOLU, and possible a neuropathic agent may be helpful for symptom control.  If it is not, then repeat EMG in 5-6 months may be needed to see if any changes can be found that relate to his imaging.     Plan:  Myositis panel, myasthenia panel, CK, aldolase  Continue with plans to have TOLU      Follow up in Neurology clinic in 4-5 months by video or earlier as needed should new concerns arise.    MARIELA Wilson D.O.   of Neurology      Greater than 50% of the total time (45 min) in this patient encounter was spent on counseling and/or coordination of care. We reviewed diagnostic results, impressions, and discussed other possible tests if symptoms do not improve. We discussed the implications of the diagnosis, as well as risks and benefits of management options. We reviewed treatment instructions and our scheduled follow-up as specified in the discharge plan. We also discussed the importance of compliance with the chosen course of treatment. The patient is in agreement with this plan and has no further questions.        Again, thank you for allowing me to participate in the care of your patient.      Sincerely,    Jaime Wilson, DO

## 2020-10-07 NOTE — LETTER
10/7/2020       RE: Rosendo Dallas  [address]  [place] [address]     Dear Colleague,    Thank you for referring your patient, Rosendo Dallas, to the [facility] NEUROLOGY [facility] [place] at [facility]. Please see a copy of my visit note below.    [facility] Neurology Follow Up    Rosendo Dallas MRN# [id number]   Age: 39 year old YOB: 1980     Requesting physician: Gayle Fontenot  [facility]     Reason for Consultation: muscle twitching, fasciculations      History of Presenting Symptoms:   Rosendo Dallas is a 39 year old male who presents today for evaluation of muscle twitching.  The patient has been see in the past through [facility] with Dr. Johnston on 9/16/2020 for muscle twitching.  The patient has a pertinent medical history for IBS, Vitamin D deficiency, MDD with anxiety.  In review of his chart, the patient has reported chest tightness, fatigue, muscle soreness, SOB and muscle fasciculations during the summer of 2020.  He reported that muscle fasciculations of the chest, biceps, triceps and calf muscles were his primary concern.  He was seen in the [facility] ER. with testing postiive for CK of 572 and striated muscle antibody elevation (1:40).  Other testing (Rh, KYRA, ESR, CRP, TSH, CMP, CBC) were unremarkable.  EMG on 9/9/2020 of upper and lower extremities were reported as unremarkable.  Exam at the time of visit was not suggestive for any major neurological condition, and the plan going forward was to repeat CK, aldolase, alk phos as well as consider repeat EMG or obtain muslce biopsy.    The patient was seen 9/7/2020 for inpatient neurology consultation.  Overall impression was for non-specific symptoms possible consistent with a myopathic process. Recommendations were to follow up with neurology, consider gabapentin for cramps, and possibly have an EMG with repeat Ach R Helen.    The patient that his symptoms primarily  arise in the AM when getting up, or in the evening when going to bed.  When active, things are better.  He does think he gets fatigued easily, or more-so than he should.  His muscle twitching is 100% late evening or early morning.  Physical therapy has been somewhat helpful, and feels good to some degree.  He hasn't noted a major improvement in strength with the PT though.   He has no shooting pain down his arms or legs. There is no bowel, bladder involvement.  There is no gynecomastia, excessive drooling, diplopia, chewing weakness, change in voice, choking events noted.  The patient is a , and has been doing thins for 10+ years. He reports no major change in mood, sleep, or tolerance to hot and cold temperatures.    The patient is recommended to have L-C5-6 transforaminal TOLU based on his exam and imaging.  This recommendation came from [facility] on 9/4/2020.  He has not done this, and is trying to have PT/conservative medical management before committing to the procedure.  The patient played football, but otherwise has no major traumas to his neck or back.  He has had normal development with no major weakness, regressions in walking, or atypical pain issues.  He has no issues with urination or discoloration with urine.  He has no rashes on his body or near affected muscles.      Past Medical History:     Patient Active Problem List   Diagnosis     Vitamin D deficiency     Anxiety     Major depressive disorder, single episode, moderate (H)     Gilbert syndrome     IBS (irritable bowel syndrome)     Past Medical History:   Diagnosis Date     Head injury 15 + years ago    Likely had 1 to 2 concussions in HS and college athletics     IBS (irritable colon syndrome)       Past Surgical History:     Past Surgical History:   Procedure Laterality Date     COLONOSCOPY  3 years ago    No concerns     ORTHOPEDIC SURGERY  04/18    Mensicus / ACL surgery      Social History:     Tobacco Use     Smoking  "status: Never Smoker     Smokeless tobacco: Never Used   Substance Use Topics     Alcohol use: Yes     Comment: 2 - 3 drinks a week     Drug use: No      Family History:     Family History   Problem Relation Age of Onset     Hypertension Mother      Hyperlipidemia Mother      Depression Other         dad's side     Heart Disease Maternal Grandfather      Diabetes No family hx of      Coronary Artery Disease No family hx of      Cerebrovascular Disease No family hx of      Breast Cancer No family hx of      Colon Cancer No family hx of      Prostate Cancer No family hx of      Thyroid Disease No family hx of      Glaucoma No family hx of      Macular Degeneration No family hx of       Medications:     Current Outpatient Medications   Medication Sig     fluticasone (FLONASE) 50 MCG/ACT nasal spray Spray 1 spray into both nostrils daily     omeprazole (PRILOSEC) 10 MG capsule Take by mouth 30-60 minutes before a meal.      Allergies:     Allergies   Allergen Reactions     Seasonal Allergies Itching      Review of Systems:   A comprehensive 10 point review of systems (constitutional, ENT, cardiac, peripheral vascular, lymphatic, respiratory, GI, , Musculoskeletal, skin, Neurological) was performed and found to be negative except as described in this note.      Physical Exam:   Vitals: /88 (BP Location: Right arm, Patient Position: Sitting, Cuff Size: Adult Regular)   Pulse 85   Ht 1.854 m (6' 1\")   Wt 94.3 kg (208 lb)   BMI 27.44 kg/m     General: Seated comfortably in no acute distress.  HEENT: Neck supple with normal range of motion. No paracervical muscle tenderness or tightness.  Optic discs sharp and vasculature normal on funduscopic exam.   Heart: Regular rate  Lungs: breathing comfortably  GI: Non tender  Extremities: no edema  Skin: No rashes  Neurologic:     Mental Status: Fully alert, attentive and oriented. Speech clear and fluent, no paraphasic errors.     Cranial Nerves: Visual fields intact. " PERRL. EOMI with normal smooth pursuit. Facial sensation intact/symmetric. Facial movements symmetric. Hearing not formally tested but intact to conversation. Palate elevation symmetric, uvula midline. No dysarthria. Shoulder shrug strong bilaterally. Tongue protrusion midline.     Motor: No tremors or other abnormal movements observed. Muscle tone normal throughout. No pronator drift. Normal/symmetric rapid finger tapping. Strength 5/5 throughout upper and lower extremities.     Deep Tendon Reflexes: 2+/symmetric throughout upper and lower extremities. No clonus. Toes downgoing bilaterally.     Sensory: Intact/symmetric to light touch, pinprick, temperature, vibration and proprioception throughout upper and lower extremities. Negative Romberg.      Coordination: Finger-nose-finger and heel-shin intact without dysmetria. Rapid alternating movements intact/symmetric with normal speed and rhythm.     Gait: Normal, steady casual gait. Able to walk on toes, heels and tandem without difficulty.         Data: Pertinent prior to visit   Imaging:  MRI brain: 2/6/2020  Unchanged. Normal MRI appearance. Rightward nasal septum deviation.    MRI cervical spine: 9/2/2020  Severe left C5-6 and bilateral C6-7 degenerative foraminal stenosis  Moderate severe left C4-5 degenerative foraminal stenosis  Mild central canal stenosis C6-7 with no cord compression  Bulge and osteophyte and cord abutment C3-4 and C4-5         Assessment and Plan:   Assessment:  Muscle fatigue with elevated CK and cervical spinal stenosis/cord abutment from osteophyte at C3-4-5, and severe left C5-6/Bilatearl C6-7 degenerative foraminal stenosis    The patient has no discernable weakness on exam, nor any sensory loss, but does have diminished reflexes on his right patellar as well as right biceps.  His exam findings don't necessarily match that of his described myotomes and dermatomal symptoms, nor his imaging to a great degree.  I am not sure as to why he  had elevated CK as well as subtly high AChR carmelo + test, and these tests should be repeated along with inflammatory myopathy panel.  If these tests are normal, his symptoms are likely a combination of his cervical spine image findings as well as aging.  Further treatment with TOLU, and possible a neuropathic agent may be helpful for symptom control.  If it is not, then repeat EMG in 5-6 months may be needed to see if any changes can be found that relate to his imaging.     Plan:  Myositis panel, myasthenia panel, CK, aldolase  Continue with plans to have TOLU    Follow up in Neurology clinic in 4-5 months by video or earlier as needed should new concerns arise.    Greater than 50% of the total time (45 min) in this patient encounter was spent on counseling and/or coordination of care. We reviewed diagnostic results, impressions, and discussed other possible tests if symptoms do not improve. We discussed the implications of the diagnosis, as well as risks and benefits of management options. We reviewed treatment instructions and our scheduled follow-up as specified in the discharge plan. We also discussed the importance of compliance with the chosen course of treatment. The patient is in agreement with this plan and has no further questions.    Again, thank you for allowing me to participate in the care of your patient.  Sincerely,    MARIELA Wilson D.O.   of Neurology

## 2020-10-07 NOTE — PATIENT INSTRUCTIONS
We will obtain serum testing to rule out other inflammatory conditions of the muscle and nerves:  Myositis panel, myasthenia panel, and repeat of CK/Aldolase    We will wait 4-5 months for repeat visit, so that if an EMG is needed and ordered, then it will be more likely to show a finding versus repeating the test to early.    I would recommend trying the TOLU to see if symptom improvement occurs

## 2020-10-07 NOTE — PROGRESS NOTES
[facility] Neurology Follow Up    Rosendo Dallas MRN# [id number]   Age: 39 year old YOB: 1980     [facility]     Reason for Consultation: muscle twitching, fasciculations      History of Presenting Symptoms:   Rosendo Dallas is a 39 year old male who presents today for evaluation of muscle twitching.  The patient has been see in the past through [facility] with Dr. Johnston on 9/16/2020 for muscle twitching.  The patient has a pertinent medical history for IBS, Vitamin D deficiency, MDD with anxiety.  In review of his chart, the patient has reported chest tightness, fatigue, muscle soreness, SOB and muscle fasciculations during the summer of 2020.  He reported that muscle fasciculations of the chest, biceps, triceps and calf muscles were his primary concern.  He was seen in the [facility] ER. with testing postiive for CK of 572 and striated muscle antibody elevation (1:40).  Other testing (Rh, KYRA, ESR, CRP, TSH, CMP, CBC) were unremarkable.  EMG on 9/9/2020 of upper and lower extremities were reported as unremarkable.  Exam at the time of visit was not suggestive for any major neurological condition, and the plan going forward was to repeat CK, aldolase, alk phos as well as consider repeat EMG or obtain muslce biopsy.    The patient was seen 9/7/2020 for inpatient neurology consultation.  Overall impression was for non-specific symptoms possible consistent with a myopathic process. Recommendations were to follow up with neurology, consider gabapentin for cramps, and possibly have an EMG with repeat Ach R Helen.    The patient that his symptoms primarily arise in the AM when getting up, or in the evening when going to bed.  When active, things are better.  He does think he gets fatigued easily, or more-so than he should.  His muscle twitching is 100% late evening or early morning.  Physical therapy has been somewhat helpful, and feels good to some degree.   He hasn't noted a major improvement in strength with the PT though.   He has no shooting pain down his arms or legs. There is no bowel, bladder involvement.  There is no gynecomastia, excessive drooling, diplopia, chewing weakness, change in voice, choking events noted.  The patient is a , and has been doing thins for 10+ years. He reports no major change in mood, sleep, or tolerance to hot and cold temperatures.    The patient is recommended to have L-C5-6 transforaminal TOLU based on his exam and imaging.  This recommendation came from [facility] on 9/4/2020.  He has not done this, and is trying to have PT/conservative medical management before committing to the procedure.  The patient played football, but otherwise has no major traumas to his neck or back.  He has had normal development with no major weakness, regressions in walking, or atypical pain issues.  He has no issues with urination or discoloration with urine.  He has no rashes on his body or near affected muscles.      Past Medical History:     Patient Active Problem List   Diagnosis     Vitamin D deficiency     Anxiety     Major depressive disorder, single episode, moderate (H)     Gilbert syndrome     IBS (irritable bowel syndrome)     Past Medical History:   Diagnosis Date     Head injury 15 + years ago    Likely had 1 to 2 concussions in HS and college athletics     IBS (irritable colon syndrome)       Past Surgical History:     Past Surgical History:   Procedure Laterality Date     COLONOSCOPY  3 years ago    No concerns     ORTHOPEDIC SURGERY  04/18    Mensicus / ACL surgery      Social History:     Tobacco Use     Smoking status: Never Smoker     Smokeless tobacco: Never Used   Substance Use Topics     Alcohol use: Yes     Comment: 2 - 3 drinks a week     Drug use: No      Family History:     Family History   Problem Relation Age of Onset     Hypertension Mother      Hyperlipidemia Mother      Depression Other         dad's  "side     Heart Disease Maternal Grandfather      Diabetes No family hx of      Coronary Artery Disease No family hx of      Cerebrovascular Disease No family hx of      Breast Cancer No family hx of      Colon Cancer No family hx of      Prostate Cancer No family hx of      Thyroid Disease No family hx of      Glaucoma No family hx of      Macular Degeneration No family hx of       Medications:     Current Outpatient Medications   Medication Sig     fluticasone (FLONASE) 50 MCG/ACT nasal spray Spray 1 spray into both nostrils daily     omeprazole (PRILOSEC) 10 MG capsule Take by mouth 30-60 minutes before a meal.      Allergies:     Allergies   Allergen Reactions     Seasonal Allergies Itching      Review of Systems:   A comprehensive 10 point review of systems (constitutional, ENT, cardiac, peripheral vascular, lymphatic, respiratory, GI, , Musculoskeletal, skin, Neurological) was performed and found to be negative except as described in this note.      Physical Exam:   Vitals: /88 (BP Location: Right arm, Patient Position: Sitting, Cuff Size: Adult Regular)   Pulse 85   Ht 1.854 m (6' 1\")   Wt 94.3 kg (208 lb)   BMI 27.44 kg/m     General: Seated comfortably in no acute distress.  HEENT: Neck supple with normal range of motion. No paracervical muscle tenderness or tightness.  Optic discs sharp and vasculature normal on funduscopic exam.   Heart: Regular rate  Lungs: breathing comfortably  GI: Non tender  Extremities: no edema  Skin: No rashes  Neurologic:     Mental Status: Fully alert, attentive and oriented. Speech clear and fluent, no paraphasic errors.     Cranial Nerves: Visual fields intact. PERRL. EOMI with normal smooth pursuit. Facial sensation intact/symmetric. Facial movements symmetric. Hearing not formally tested but intact to conversation. Palate elevation symmetric, uvula midline. No dysarthria. Shoulder shrug strong bilaterally. Tongue protrusion midline.     Motor: No tremors or other " abnormal movements observed. Muscle tone normal throughout. No pronator drift. Normal/symmetric rapid finger tapping. Strength 5/5 throughout upper and lower extremities.     Deep Tendon Reflexes: 2+/symmetric throughout upper and lower extremities. No clonus. Toes downgoing bilaterally.     Sensory: Intact/symmetric to light touch, pinprick, temperature, vibration and proprioception throughout upper and lower extremities. Negative Romberg.      Coordination: Finger-nose-finger and heel-shin intact without dysmetria. Rapid alternating movements intact/symmetric with normal speed and rhythm.     Gait: Normal, steady casual gait. Able to walk on toes, heels and tandem without difficulty.         Data: Pertinent prior to visit   Imaging:  MRI brain: 2/6/2020  Unchanged. Normal MRI appearance. Rightward nasal septum deviation.    MRI cervical spine: 9/2/2020  Severe left C5-6 and bilateral C6-7 degenerative foraminal stenosis  Moderate severe left C4-5 degenerative foraminal stenosis  Mild central canal stenosis C6-7 with no cord compression  Bulge and osteophyte and cord abutment C3-4 and C4-5         Assessment and Plan:   Assessment:  Muscle fatigue with elevated CK and cervical spinal stenosis/cord abutment from osteophyte at C3-4-5, and severe left C5-6/Bilatearl C6-7 degenerative foraminal stenosis    The patient has no discernable weakness on exam, nor any sensory loss, but does have diminished reflexes on his right patellar as well as right biceps.  His exam findings don't necessarily match that of his described myotomes and dermatomal symptoms, nor his imaging to a great degree.  I am not sure as to why he had elevated CK as well as subtly high AChR carmelo + test, and these tests should be repeated along with inflammatory myopathy panel.  If these tests are normal, his symptoms are likely a combination of his cervical spine image findings as well as aging.  Further treatment with TOLU, and possible a neuropathic  agent may be helpful for symptom control.  If it is not, then repeat EMG in 5-6 months may be needed to see if any changes can be found that relate to his imaging.     Plan:  Myositis panel, myasthenia panel, CK, aldolase  Continue with plans to have TOLU      Follow up in Neurology clinic in 4-5 months by video or earlier as needed should new concerns arise.    MARIELA Wilson D.O.   of Neurology      Greater than 50% of the total time (45 min) in this patient encounter was spent on counseling and/or coordination of care. We reviewed diagnostic results, impressions, and discussed other possible tests if symptoms do not improve. We discussed the implications of the diagnosis, as well as risks and benefits of management options. We reviewed treatment instructions and our scheduled follow-up as specified in the discharge plan. We also discussed the importance of compliance with the chosen course of treatment. The patient is in agreement with this plan and has no further questions.

## 2020-10-08 LAB
ACHR BIND AB SER-SCNC: 0 NMOL/L (ref 0–0.4)
ACHR BLOCK AB/ACHR TOTAL SFR SER: 0 % (ref 0–26)
ALDOLASE SERPL-CCNC: 3.9 U/L (ref 1.5–8.1)

## 2020-10-09 LAB — ACHR MOD AB/ACHR TOTAL SFR SER: 0 %

## 2020-10-10 LAB — STRIA MUS IGG SER QL IF: NORMAL

## 2020-10-18 LAB
ANNOTATION COMMENT IMP: NORMAL
EJ AB SER QL: NEGATIVE
ENA JO1 AB TITR SER: 1 AU/ML (ref 0–40)
MDA5 (CADM 140) ABY: NEGATIVE
MI2 AB SER QL: NEGATIVE
NXP-2 (NUCLEAR MATRIX PROTEIN 2) ABY: NEGATIVE
OJ AB SER QL: NEGATIVE
P155/140 (TIF1-GAMMA) ANTIBODY: NEGATIVE
PL12 AB SER QL: NEGATIVE
PL7 AB SER QL: NEGATIVE
SAE1 (SUMO ACTIVATING ENZYME) ABY: NEGATIVE
SRP AB SERPL QL: NEGATIVE
TIF-1 GAMMA ANTIBODY: NEGATIVE

## 2020-10-20 ENCOUNTER — TELEPHONE (OUTPATIENT)
Dept: NEUROLOGY | Facility: CLINIC | Age: 40
End: 2020-10-20

## 2020-10-20 ENCOUNTER — PRE VISIT (OUTPATIENT)
Dept: PULMONOLOGY | Facility: CLINIC | Age: 40
End: 2020-10-20

## 2020-10-20 DIAGNOSIS — M79.18 MYOFASCIAL PAIN: Primary | ICD-10-CM

## 2020-10-20 NOTE — TELEPHONE ENCOUNTER
The patient will need repeat EMG with single fiber if possible.  He continues to have muscle fatigue, spasms, and has had two 1:40 striated carmelo positives.    - EMG b/l (upper, lower)    MARIELA Wilson D.O.  [facility]

## 2020-10-21 NOTE — TELEPHONE ENCOUNTER
RECORDS RECEIVED FROM: internal/CE   DATE RECEIVED: 11/30/20    NOTES STATUS DETAILS   OFFICE NOTE from referring provider internal Thomas Barrios,   OFFICE NOTE from other specialist internal 5.18.20 Bolivar BARNETT  4.6.20 Miguel Li R  9.30.20 Ruthy Verat   4.22.19, Ralf   2.25.19, Vocal A   DISCHARGE SUMMARY from hospital na    DISCHARGE REPORT from the ER internal 9.6.20 mamta ORDONEZ  9.4.20 Trigger B   MEDICATION LIST internal    IMAGING  (NEED IMAGES AND REPORTS)     CT SCAN na    CHEST XRAY (CXR) CE/internal [facility] 6.17.20   Internal- 9.6.20, 9.4.20, 4.6.20, 2.25.19,    TESTS     PULMONARY FUNCTION TESTING (PFT) Internal/scheduled  Scheduled 11.30.20

## 2020-10-22 ENCOUNTER — OFFICE VISIT (OUTPATIENT)
Dept: NEUROLOGY | Facility: CLINIC | Age: 40
End: 2020-10-22
Payer: COMMERCIAL

## 2020-10-22 DIAGNOSIS — M47.812 CERVICAL SPONDYLOSIS WITHOUT MYELOPATHY: Primary | ICD-10-CM

## 2020-10-22 PROCEDURE — 95885 MUSC TST DONE W/NERV TST LIM: CPT | Mod: 59 | Performed by: PSYCHIATRY & NEUROLOGY

## 2020-10-22 PROCEDURE — 95886 MUSC TEST DONE W/N TEST COMP: CPT | Performed by: PSYCHIATRY & NEUROLOGY

## 2020-10-22 PROCEDURE — 95911 NRV CNDJ TEST 9-10 STUDIES: CPT | Performed by: PSYCHIATRY & NEUROLOGY

## 2020-10-22 NOTE — LETTER
10/22/2020       RE: Rosendo Dallas  [address]  [facility] [address]     Dear Colleague,    Thank you for referring your patient, Rosendo Dallas, to the [facility] EMG CLINIC [place] [facility]. Please see a copy of my visit note below.      [facility]  Electrodiagnostic Laboratory    Nerve Conduction & EMG Report          Patient: Rosendo Puente YOB: 1980  Patient ID: [id number] Age: 39 Years 11 Months  Gender: Male      History & Examination:      Techniques:  Sensory and motor conduction studies were done with surface recording electrodes. EMG was done with a concentric needle electrode.     Results: Left median and ulnar sensory nerve action potentials were normal.  The left superficial radial sensory nerve action potential was normal.  The left superficial peroneal and sural sensory nerve action potentials were normal.  The left median and ulnar motor conduction studies were normal.  The left peroneal and tibial motor conduction studies were normal.  Three Hz repetitive stimulation of the ulnar nerve at rest and after one minute of maximal exercise revealed no decrement.  Needle exam of the left leg, left arm and right arm was performed.  Prolonged observation was done at rest and no fasciculation was seen in any muscle.  There was no fibrillation or other iterative discharge.  Motor unit potentials were all normal and there was no motor unit potential variation.      Interpretation: The EMG is normal.  There is no EMG evidence for diffuse denervating disease such as amyotrophic lateral sclerosis.  There is no evidence for a defect in neuromuscular transmission.  There is no EMG evidence for a left or right C5 or C6 radiculopathy.      EMG Physician: Serge Grey MD          Sensory NCS      Nerve / Sites Rec. Site Onset Peak NP Amp Ref. PP Amp Dist Ryley Ref. Temp     ms ms  V  V  V cm m/s m/s  C   L MEDIAN - Dig II Anti       Wrist Dig II 2.50 3.59 24.8 10.0 42.4 14 56.0 48.0 29.4   L ULNAR - Dig V Anti      Wrist Dig V 2.40 3.75 25.2 8.0 41.5 12.5 52.2 48.0 29   L RADIAL - Snuff      Forearm Snuff 2.14 2.86 23.0 15.0 37.1 12.5 58.5 48.0 29.4   L SURAL - Lat Mall      Calf Ankle 3.49 4.58 8.1 8.0 6.4 14 40.1 38.0 27.9      Calf Ankle 3.65 4.84 8.2 8.0 4.9 14 38.4  27.9      Calf Ankle 3.49 4.53 7.3  6.0 14 40.1  27.9   L SUP PERONEAL      Lat Leg Jeffries 2.97 4.22 5.3  4.1 12.5 42.1 38.0 27.9       Motor NCS      Nerve / Sites Rec. Site Lat Ref. Amp Ref. Rel Amp Dist Ryley Ref. Dur. Area Temp.     ms ms mV mV % cm m/s m/s ms %  C   L MEDIAN - APB      Wrist APB 3.44 4.40 10.5 5.0 100 8   6.46 100 29.7      Elbow APB 7.60  10.0  95.4 23 55.2 48.0 6.61 88.2 29.7   L ULNAR - ADM      Wrist ADM 3.23 3.50 14.5 5.0 100 8   7.76 100 28.9      B.Elbow ADM 7.29  14.3  99.1 22 54.2 48.0 7.03 99.4 28.9      A.Elbow ADM 9.32  14.3  98.9 9 44.3 48.0 7.50 94 28.9   L DEEP PERONEAL - EDB      Ankle EDB 3.54 6.00 10.3 2.5 100 8   5.89 100 28.1      FibHead EDB 12.03  9.4  90.7 40 47.1 38.0 6.20 90.2 28      Pop Fos EDB 13.96  8.9  86.3 10 51.9 38.0 5.99 87.7 28   L TIBIAL - AH      Ankle AH 4.48 6.00 10.2 4.0 100 8   7.55 100 27.9      Pop Fos AH 14.43  5.5  54 42 42.2 38.0 8.91 87.2 27.9       F  Wave      Nerve Min F Lat Max F Lat Mean FLat Temp.    ms ms ms  C   L TIBIAL 60.47 74.69 64.65 27.8       Rep Nerve Stim 6      Muscle / Train Time Rate Amp 4-1 Fac Ampl Area 4-1 Fac Area     pps mV % % mVms % %   L ABD DIG MIN (UL)   Baseline 0:00:00 3 15.0 3.6 100 57.6 -1.9 100   Post Exercise : 1 0:01:09 3 15.5 2 103 57.5 2 99.9   2 0:02:10 3 14.8 1.5 99 63.8 -5.8 111   3 0:03:12 3 14.7 3.6 98.1 59.2 -4 103       Needle EMG (W)      EMG Summary Table     Spontaneous MUAP Recruitment    IA Fib/PSW Fasc H.F. Amp Dur. PPP Pattern   L. TIB ANTERIOR N None None None N N None Normal   L. GASTROCN (MED) N None None None N N None Normal   L. VAST LATERALIS N None None  None N N None Normal   L. FIRST D INTEROSS N None None None N N None Normal   L. PRON TERES N None None None N N None Normal   L. BICEPS N None None None N N None Normal   L. TRICEPS N None None None N N None Normal   L. DELTOID N None None None N N None Normal   R. BICEPS N None None None N N None Normal   R. DELTOID N None None None N N None Normal                                    Again, thank you for allowing me to participate in the care of your patient.      Sincerely,    Serge Grey MD

## 2020-10-22 NOTE — PROGRESS NOTES
[facility]  Electrodiagnostic Laboratory    Nerve Conduction & EMG Report          Patient: Rosendo Puente YOB: 1980  Patient ID: [id number] Age: 39 Years 11 Months  Gender: Male      History & Examination:      Techniques:  Sensory and motor conduction studies were done with surface recording electrodes. EMG was done with a concentric needle electrode.     Results: Left median and ulnar sensory nerve action potentials were normal.  The left superficial radial sensory nerve action potential was normal.  The left superficial peroneal and sural sensory nerve action potentials were normal.  The left median and ulnar motor conduction studies were normal.  The left peroneal and tibial motor conduction studies were normal.  Three Hz repetitive stimulation of the ulnar nerve at rest and after one minute of maximal exercise revealed no decrement.  Needle exam of the left leg, left arm and right arm was performed.  Prolonged observation was done at rest and no fasciculation was seen in any muscle.  There was no fibrillation or other iterative discharge.  Motor unit potentials were all normal and there was no motor unit potential variation.      Interpretation: The EMG is normal.  There is no EMG evidence for diffuse denervating disease such as amyotrophic lateral sclerosis.  There is no evidence for a defect in neuromuscular transmission.  There is no EMG evidence for a left or right C5 or C6 radiculopathy.      EMG Physician: Serge Grey MD          Sensory NCS      Nerve / Sites Rec. Site Onset Peak NP Amp Ref. PP Amp Dist Ryley Ref. Temp     ms ms  V  V  V cm m/s m/s  C   L MEDIAN - Dig II Anti      Wrist Dig II 2.50 3.59 24.8 10.0 42.4 14 56.0 48.0 29.4   L ULNAR - Dig V Anti      Wrist Dig V 2.40 3.75 25.2 8.0 41.5 12.5 52.2 48.0 29   L RADIAL - Snuff      Forearm Snuff 2.14 2.86 23.0 15.0 37.1 12.5 58.5 48.0 29.4   L SURAL - Lat Mall      Calf Ankle 3.49 4.58 8.1 8.0 6.4 14 40.1 38.0  27.9      Calf Ankle 3.65 4.84 8.2 8.0 4.9 14 38.4  27.9      Calf Ankle 3.49 4.53 7.3  6.0 14 40.1  27.9   L SUP PERONEAL      Lat Leg Jeffries 2.97 4.22 5.3  4.1 12.5 42.1 38.0 27.9       Motor NCS      Nerve / Sites Rec. Site Lat Ref. Amp Ref. Rel Amp Dist Ryley Ref. Dur. Area Temp.     ms ms mV mV % cm m/s m/s ms %  C   L MEDIAN - APB      Wrist APB 3.44 4.40 10.5 5.0 100 8   6.46 100 29.7      Elbow APB 7.60  10.0  95.4 23 55.2 48.0 6.61 88.2 29.7   L ULNAR - ADM      Wrist ADM 3.23 3.50 14.5 5.0 100 8   7.76 100 28.9      B.Elbow ADM 7.29  14.3  99.1 22 54.2 48.0 7.03 99.4 28.9      A.Elbow ADM 9.32  14.3  98.9 9 44.3 48.0 7.50 94 28.9   L DEEP PERONEAL - EDB      Ankle EDB 3.54 6.00 10.3 2.5 100 8   5.89 100 28.1      FibHead EDB 12.03  9.4  90.7 40 47.1 38.0 6.20 90.2 28      Pop Fos EDB 13.96  8.9  86.3 10 51.9 38.0 5.99 87.7 28   L TIBIAL - AH      Ankle AH 4.48 6.00 10.2 4.0 100 8   7.55 100 27.9      Pop Fos AH 14.43  5.5  54 42 42.2 38.0 8.91 87.2 27.9       F  Wave      Nerve Min F Lat Max F Lat Mean FLat Temp.    ms ms ms  C   L TIBIAL 60.47 74.69 64.65 27.8       Rep Nerve Stim 6      Muscle / Train Time Rate Amp 4-1 Fac Ampl Area 4-1 Fac Area     pps mV % % mVms % %   L ABD DIG MIN (UL)   Baseline 0:00:00 3 15.0 3.6 100 57.6 -1.9 100   Post Exercise : 1 0:01:09 3 15.5 2 103 57.5 2 99.9   2 0:02:10 3 14.8 1.5 99 63.8 -5.8 111   3 0:03:12 3 14.7 3.6 98.1 59.2 -4 103       Needle EMG (W)      EMG Summary Table     Spontaneous MUAP Recruitment    IA Fib/PSW Fasc H.F. Amp Dur. PPP Pattern   L. TIB ANTERIOR N None None None N N None Normal   L. GASTROCN (MED) N None None None N N None Normal   L. VAST LATERALIS N None None None N N None Normal   L. FIRST D INTEROSS N None None None N N None Normal   L. PRON TERES N None None None N N None Normal   L. BICEPS N None None None N N None Normal   L. TRICEPS N None None None N N None Normal   L. DELTOID N None None None N N None Normal   R. BICEPS N None None None N  N None Normal   R. DELTOID N None None None N N None Normal

## 2020-10-26 ENCOUNTER — TELEPHONE (OUTPATIENT)
Dept: NEUROLOGY | Facility: CLINIC | Age: 40
End: 2020-10-26

## 2020-10-26 NOTE — TELEPHONE ENCOUNTER
I spoke with Rosendo about his normal Rep-stim EMG and normal EMG al-together.  Given these findings, I indicated it is unlikely that he has an autoimmune process causing issues with neuromuscular transmission and that further therapies (TOLU, or other) may be helpful but aren't necessarily guaranteed to relieve symptoms.  I indicated that following his procedures (roughly a month or two) he should contact me through [facility] to update me on his symptoms, so that we can schedule follow up if needed.    MARIELA Wilson D.O.  [facility] Neurology

## 2020-11-30 ENCOUNTER — PRE VISIT (OUTPATIENT)
Facility: CLINIC | Age: 40
End: 2020-11-30

## 2020-11-30 ENCOUNTER — PRE VISIT (OUTPATIENT)
Dept: PULMONOLOGY | Facility: CLINIC | Age: 40
End: 2020-11-30

## 2020-11-30 ENCOUNTER — VIRTUAL VISIT (OUTPATIENT)
Dept: PULMONOLOGY | Facility: CLINIC | Age: 40
End: 2020-11-30
Attending: INTERNAL MEDICINE
Payer: COMMERCIAL

## 2020-11-30 DIAGNOSIS — R06.00 DYSPNEA: ICD-10-CM

## 2020-11-30 DIAGNOSIS — J45.20 MILD INTERMITTENT ASTHMA WITHOUT COMPLICATION: ICD-10-CM

## 2020-11-30 DIAGNOSIS — R06.09 DYSPNEA ON EXERTION: Primary | ICD-10-CM

## 2020-11-30 PROCEDURE — 94375 RESPIRATORY FLOW VOLUME LOOP: CPT | Performed by: INTERNAL MEDICINE

## 2020-11-30 PROCEDURE — 94726 PLETHYSMOGRAPHY LUNG VOLUMES: CPT | Performed by: INTERNAL MEDICINE

## 2020-11-30 PROCEDURE — 94729 DIFFUSING CAPACITY: CPT | Performed by: INTERNAL MEDICINE

## 2020-11-30 PROCEDURE — 99205 OFFICE O/P NEW HI 60 MIN: CPT | Mod: 95 | Performed by: INTERNAL MEDICINE

## 2020-11-30 NOTE — PROGRESS NOTES
"Rosendo Dallas is a 40 year old male who is being evaluated via a billable video visit.      The patient has been notified of following:     \"This video visit will be conducted via a call between you and your physician/provider. We have found that certain health care needs can be provided without the need for an in-person physical exam.  This service lets us provide the care you need with a video conversation.  If a prescription is necessary we can send it directly to your pharmacy.  If lab work is needed we can place an order for that and you can then stop by our lab to have the test done at a later time.    Video visits are billed at different rates depending on your insurance coverage.  Please reach out to your insurance provider with any questions.    If during the course of the call the physician/provider feels a video visit is not appropriate, you will not be charged for this service.\"    Patient has given verbal consent for Video visit? Yes  How would you like to obtain your AVS? Haltonhart  If you are dropped from the video visit, the video invite should be resent to: Other e-mail: [employer]  Will anyone else be joining your video visit? No        Video-Visit Details    Type of service:  Video Visit    Total Video visit time = 17 minutes    Originating Location (pt. Location): Home    Distant Location (provider location):  [facility] AND [facility]     Platform used for Video Visit: [employer]     HPI:    Mr. Rosendo Dallas is a 40 yom with no significant past medical history who presents to pulmonary clinic today for further evaluation of dyspnea..  Review of records is notable for the following:    -Got really sick last spring with viral respiratory infection.  Did not require hospitalization but had heavy coughing, shortness of breath, and high fever -- tested negative for COVID.  Not tested for influenza or other respiratory viruses at that time that he knows of.  Has " had a persistent of symptoms since that time including fatigue and episodic dyspnea.    -Over the summer he developed chest tightness, fatigue, muscle soreness and fasciculations.  This prompted an ED visit in September and subsequent neurology referral.  To summarize, lab work was notable for mildly elevated .  Myositis panel, striated muscle antibody, acetylcholine antibodies,  RF, KYRA, ESR, CRP, TSH, CMP, CBC were all normal.  Neurology consultation occurred in October and EMG x2 was obtained and returned within normal.  The opinion of neurology was that this was unlikely to be auto-immune in etiology and felt to most likely be related to some type of post-viral etiology.    -CXR 9/6 - negative for any cardiopulmonary abnormality    -Stress Echo 9/30: normal    Mr. Dallas provides the following additional history:    -over the summer he would get waves of shortness of breath with exertion mostly, taking a flight of stairs or with exercise. This has persisted since his viral illness last spring but is perhaps slowly improving. He tells me he was able to go for a run yesterday.    -Clears throat off and on but no cough really.  No hemoptysis or sputum production.  Denies chest pain or fevers.  Weight is stable.    -Does have history of mild asthma -- symptoms are usually worse in spring - treats with albuterol as needed.  No history of flares or exacerbation.  No history of recurrent pneumonia.  Has tried his albuterol inhaler for the shortness of breath and it sometimes provides relief and sometimes does not seem to help much.    -Never smoker.  Denies vaping.  -Lives in [place], [place].  No pets.  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  -No significant exposure to birds, down feathers, no recent travel.    -No family history of lung or auto-immune disease.    GENERAL: Healthy, alert and no distress  EYES: Eyes grossly normal to inspection.  No discharge or erythema, or  obvious scleral/conjunctival abnormalities.  RESP: No audible wheeze, cough, or visible cyanosis.  No visible retractions or increased work of breathing.    SKIN: Visible skin clear. No significant rash, abnormal pigmentation or lesions.  NEURO: Cranial nerves grossly intact.  Mentation and speech appropriate for age.  PSYCH: Mentation appears normal, affect normal/bright, judgement and insight intact, normal speech and appearance well-groomed.    Data:    PFT:  R 83%, FVC 78%, FEV1 82%, TLC 85%, RV 95%, DLCO 139%.  Study demonstrates a non-specific ventilatory defect which is neither obstructive nor restrictive based on normal TLC.  Diffusion capacity is normal.  There is no prior study available for comparison.    Assessment and Plan:    Mr. Rosendo Dallas is a 40 yom with no significant past medical history who presents to pulmonary clinic today for further evaluation of dyspnea.    1. Dyspnea.  Started following a severe viral URI he experienced last spring and has been persistent and episodic since that time.  Mostly occurs with exertion.  CXR and stress echo from September were unremarkable.  PFTs from today show a non-specific ventilatory defect with very mild isolated decrease in FVC (78%/4.57L).  We will plan to proceed with CT chest for further evaluation of subtle abnormalities not shown on CXR that might otherwise explain his non-specific ventilatory defect and dyspnea.  Although a primary neuromuscular etiology of symptoms was felt to be unlikely be neurology,  I still think it would be reasonable to assess inspiratory/expiratory muscle force testing to look for possible neuromuscular respiratory causes of dyspnea.  We will plan to have him return to the PFT lab for MIP and MEP testing. We will  be in touch with him pending results of his testing and any further necessary work up and testing can be dictated at that juncture.    2. Mild intermittent asthma.  Uses albuterol as needed with good relief of  symptoms - no changes.    He is up to date on a seasonal influenza vaccine.      Crystal Yanes MD

## 2020-11-30 NOTE — TELEPHONE ENCOUNTER
RECORDS RECEIVED FROM: internal/cE   DATE RECEIVED: 11.30.20    NOTES STATUS DETAILS   OFFICE NOTE from referring provider Internal  Dr. Barrios   OFFICE NOTE from other specialist Internal  5.18.20 Bolivar BARNETT  4.6.20 Miguel Li  9.30.20 Ruthy Verat   4.22.19, Ralf   2.25.19, Vocal A   DISCHARGE SUMMARY from hospital na    DISCHARGE REPORT from the ER Internal  9.6.20 mamta ORDONEZ  9.4.20 Trigger B   MEDICATION LIST Internal     IMAGING  (NEED IMAGES AND REPORTS)     CT SCAN na    CHEST XRAY (CXR) Internal/CE Internal- 9.6.20, 9.4.20, 4.6.20, 2.25.19  [facility]- 6.17.20     TESTS     PULMONARY FUNCTION TESTING (PFT) Internal  11.30.20

## 2020-11-30 NOTE — LETTER
"    11/30/2020         RE: Rosendo Dallas  [address]  [facility] [address]        Dear Colleague,    Thank you for referring your patient, Rosendo Dallas, to the [facility] AND [facility]. Please see a copy of my visit note below.    Rosendo Dallas is a 40 year old male who is being evaluated via a billable video visit.      The patient has been notified of following:     \"This video visit will be conducted via a call between you and your physician/provider. We have found that certain health care needs can be provided without the need for an in-person physical exam.  This service lets us provide the care you need with a video conversation.  If a prescription is necessary we can send it directly to your pharmacy.  If lab work is needed we can place an order for that and you can then stop by our lab to have the test done at a later time.    Video visits are billed at different rates depending on your insurance coverage.  Please reach out to your insurance provider with any questions.    If during the course of the call the physician/provider feels a video visit is not appropriate, you will not be charged for this service.\"    Patient has given verbal consent for Video visit? Yes  How would you like to obtain your AVS? MyChart  If you are dropped from the video visit, the video invite should be resent to: Other e-mail: [employer]  Will anyone else be joining your video visit? No        Video-Visit Details    Type of service:  Video Visit    Total Video visit time = 17 minutes    Originating Location (pt. Location): Home    Distant Location (provider location):  [facility] AND [facility]     Platform used for Video Visit: [employer]     HPI:    Mr. Rosendo Dallas is a 40 yom with no significant past medical history who presents to pulmonary clinic today for further evaluation of dyspnea..  Review of records is notable for the " following:    -Got really sick last spring with viral respiratory infection.  Did not require hospitalization but had heavy coughing, shortness of breath, and high fever -- tested negative for COVID.  Not tested for influenza or other respiratory viruses at that time that he knows of.  Has had a persistent of symptoms since that time including fatigue and episodic dyspnea.    -Over the summer he developed chest tightness, fatigue, muscle soreness and fasciculations.  This prompted an ED visit in September and subsequent neurology referral.  To summarize, lab work was notable for mildly elevated .  Myositis panel, striated muscle antibody, acetylcholine antibodies,  RF, KYRA, ESR, CRP, TSH, CMP, CBC were all normal.  Neurology consultation occurred in October and EMG x2 was obtained and returned within normal.  The opinion of neurology was that this was unlikely to be auto-immune in etiology and felt to most likely be related to some type of post-viral etiology.    -CXR 9/6 - negative for any cardiopulmonary abnormality    -Stress Echo 9/30: normal    Mr. Dallas provides the following additional history:    -over the summer he would get waves of shortness of breath with exertion mostly, taking a flight of stairs or with exercise. This has persisted since his viral illness last spring but is perhaps slowly improving. He tells me he was able to go for a run yesterday.    -Clears throat off and on but no cough really.  No hemoptysis or sputum production.  Denies chest pain or fevers.  Weight is stable.    -Does have history of mild asthma -- symptoms are usually worse in spring - treats with albuterol as needed.  No history of flares or exacerbation.  No history of recurrent pneumonia.  Has tried his albuterol inhaler for the shortness of breath and it sometimes provides relief and sometimes does not seem to help much.    -Never smoker.  Denies vaping.  -Lives in [place], [place].  No pets.  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  -No significant exposure to birds, down feathers, no recent travel.    -No family history of lung or auto-immune disease.    GENERAL: Healthy, alert and no distress  EYES: Eyes grossly normal to inspection.  No discharge or erythema, or obvious scleral/conjunctival abnormalities.  RESP: No audible wheeze, cough, or visible cyanosis.  No visible retractions or increased work of breathing.    SKIN: Visible skin clear. No significant rash, abnormal pigmentation or lesions.  NEURO: Cranial nerves grossly intact.  Mentation and speech appropriate for age.  PSYCH: Mentation appears normal, affect normal/bright, judgement and insight intact, normal speech and appearance well-groomed.    Data:    PFT:  R 83%, FVC 78%, FEV1 82%, TLC 85%, RV 95%, DLCO 139%.  Study demonstrates a non-specific ventilatory defect which is neither obstructive nor restrictive based on normal TLC.  Diffusion capacity is normal.  There is no prior study available for comparison.    Assessment and Plan:    Mr. Rosendo Dallas is a 40 yom with no significant past medical history who presents to pulmonary clinic today for further evaluation of dyspnea.    1. Dyspnea.  Started following a severe viral URI he experienced last spring and has been persistent and episodic since that time.  Mostly occurs with exertion.  CXR and stress echo from September were unremarkable.  PFTs from today show a non-specific ventilatory defect with very mild isolated decrease in FVC (78%/4.57L).  We will plan to proceed with CT chest for further evaluation of subtle abnormalities not shown on CXR that might otherwise explain his non-specific ventilatory defect and dyspnea.  Although a primary neuromuscular etiology of symptoms was felt to be unlikely be neurology,  I still think it would be reasonable to assess inspiratory/expiratory muscle force testing to look for possible neuromuscular respiratory causes of dyspnea.  We  will plan to have him return to the PFT lab for MIP and MEP testing. We will  be in touch with him pending results of his testing and any further necessary work up and testing can be dictated at that juncture.    2. Mild intermittent asthma.  Uses albuterol as needed with good relief of symptoms - no changes.    He is up to date on a seasonal influenza vaccine.      Crystal Yanes MD

## 2020-12-01 LAB
DLCOUNC-%PRED-PRE: 139 %
DLCOUNC-PRE: 47.42 ML/MIN/MMHG
DLCOUNC-PRED: 34 ML/MIN/MMHG
ERV-%PRED-PRE: 57 %
ERV-PRE: 0.98 L
ERV-PRED: 1.71 L
EXPTIME-PRE: 6.69 SEC
FEF2575-%PRED-PRE: 94 %
FEF2575-PRE: 4.18 L/SEC
FEF2575-PRED: 4.41 L/SEC
FEFMAX-%PRED-PRE: 91 %
FEFMAX-PRE: 9.97 L/SEC
FEFMAX-PRED: 10.86 L/SEC
FEV1-%PRED-PRE: 82 %
FEV1-PRE: 3.81 L
FEV1FEV6-PRE: 83 %
FEV1FEV6-PRED: 82 %
FEV1FVC-PRE: 83 %
FEV1FVC-PRED: 80 %
FEV1SVC-PRE: 83 %
FEV1SVC-PRED: 78 %
FIFMAX-PRE: 5.19 L/SEC
FRCPLETH-%PRED-PRE: 82 %
FRCPLETH-PRE: 2.97 L
FRCPLETH-PRED: 3.61 L
FVC-%PRED-PRE: 78 %
FVC-PRE: 4.57 L
FVC-PRED: 5.81 L
IC-%PRED-PRE: 85 %
IC-PRE: 3.64 L
IC-PRED: 4.27 L
RVPLETH-%PRED-PRE: 95 %
RVPLETH-PRE: 1.99 L
RVPLETH-PRED: 2.08 L
TLCPLETH-%PRED-PRE: 85 %
TLCPLETH-PRE: 6.61 L
TLCPLETH-PRED: 7.74 L
VA-%PRED-PRE: 86 %
VA-PRE: 6.42 L
VC-%PRED-PRE: 77 %
VC-PRE: 4.62 L
VC-PRED: 5.98 L

## 2020-12-03 DIAGNOSIS — R06.09 DYSPNEA ON EXERTION: ICD-10-CM

## 2020-12-03 LAB
EXPTIME-PRE: 8.37 SEC
FEF2575-%PRED-PRE: 85 %
FEF2575-PRE: 3.78 L/SEC
FEF2575-PRED: 4.41 L/SEC
FEFMAX-%PRED-PRE: 90 %
FEFMAX-PRE: 9.82 L/SEC
FEFMAX-PRED: 10.86 L/SEC
FEV1-%PRED-PRE: 81 %
FEV1-PRE: 3.77 L
FEV1FEV6-PRE: 83 %
FEV1FEV6-PRED: 82 %
FEV1FVC-PRE: 82 %
FEV1FVC-PRED: 80 %
FIFMAX-PRE: 5.76 L/SEC
FVC-%PRED-PRE: 78 %
FVC-PRE: 4.59 L
FVC-PRED: 5.81 L
MEP-PRE: 126 CMH2O
MIP-PRE: -107 CMH2O

## 2020-12-03 PROCEDURE — 94799 UNLISTED PULMONARY SVC/PX: CPT

## 2020-12-04 ENCOUNTER — TELEPHONE (OUTPATIENT)
Dept: PULMONOLOGY | Facility: CLINIC | Age: 40
End: 2020-12-04

## 2020-12-04 NOTE — TELEPHONE ENCOUNTER
Left voicemail to contact call center (number provided) to schedule PFT and CT Chest in the next few weeks based on Dr. Yanes's orders after his video visit with her on 11/30.

## 2020-12-07 ENCOUNTER — VIRTUAL VISIT (OUTPATIENT)
Dept: NEUROLOGY | Facility: CLINIC | Age: 40
End: 2020-12-07
Payer: COMMERCIAL

## 2020-12-07 DIAGNOSIS — G93.31 POST VIRAL SYNDROME: Primary | ICD-10-CM

## 2020-12-07 PROCEDURE — 99213 OFFICE O/P EST LOW 20 MIN: CPT | Mod: 95 | Performed by: PSYCHIATRY & NEUROLOGY

## 2020-12-07 RX ORDER — OMEPRAZOLE 20 MG/1
20 TABLET, DELAYED RELEASE ORAL
COMMUNITY

## 2020-12-07 NOTE — LETTER
12/7/2020       RE: Rosendo Dallas  [address]  [facility] [address]     Dear Colleague,    Thank you for referring your patient, Rosendo Dallas, to the [facility] [place] at [facility]. Please see a copy of my visit note below.    [facility] Neurology Follow Up Visit - Virtual    Rosendo Dallas MRN# [id number]   Age: 40 year old YOB: 1980     Brief history of symptoms: The patient was initially seen in neurologic consultation on 10/7/2020 for evaluation of muscle twitching. Please see the comprehensive neurologic consultation note from that date in the Epic records for details. The patient has had muscle twitching with soreness throughout the summer of 2020 (fasciculations of chest, biceps, triceps, gastrocnemius) leading to w/up through ER with elevated CK (572) and Striated carmelo testing showing minimal elevation (1:40).  EMG on 9/9/2020 was unremarkable.  The patient was seen through inpatient neurology 9/7/2020, with impression being for that of a non-specific myopathic process and repeat EMG, repeat Achy R carmelo were to be done as well as repeat EMG.  Prior evaluation from orthopedics (9/4/2020) led to recommendations for L-C5-6 trans-foraminal TOLU (based on imaging and exam).  Upon exam with myself, there was no discernable weakness or sensory loss but there was some diminished reflexes on right patellar and right biceps.  Further testing with Myositis panel, myasthenia panel, repeat CK, and aldolase was to be done along with repeat EMG.    EMG on 10/22/2020 was normal.  There was no evidence for R-C5 or C6 radiculopathy.  CK was 141, Aldolase was 3.9 (both normal) on 10/7/2020  Polymyositis and dermatomyositis panel was negative (10/7/2020)  Other testing was normal (acetylcholine receptor blocking, modulation, binding. Striated muscle antibody)    Ultimately it was thought that the patient likely had some degree of a post-viral  related symptomology, and he was to follow up as needed given his negative testing for muscle fasciculations and soreness.  The patient did report still having fasciculations as well as soreness of the muscles on 12/01/2020 so I advised him to have follow up to discuss possible treatment for some of these symptoms.    Interval history: The patient has focused on stretching and daily massage, as well as seeing a therapist one month ago while starting an selective serotonin reuptake inhibitor.  In the last 5-6 days, his soreness and pain points have been improved.  He is still getting twitching that is worse on some days versus others.  He will keep a journal to better find when his symptoms are coming on and if he can regulate them.        Past Medical History:     Patient Active Problem List   Diagnosis     Vitamin D deficiency     Anxiety     Major depressive disorder, single episode, moderate (H)     Gilbert syndrome     IBS (irritable bowel syndrome)     Past Medical History:   Diagnosis Date     Head injury 15 + years ago    Likely had 1 to 2 concussions in HS and college athletics     IBS (irritable colon syndrome)       Past Surgical History:     Past Surgical History:   Procedure Laterality Date     COLONOSCOPY  3 years ago    No concerns     ORTHOPEDIC SURGERY  04/18    Mensicus / ACL surgery      Social History:     Tobacco Use     Smoking status: Never Smoker     Smokeless tobacco: Never Used   Substance Use Topics     Alcohol use: Yes     Comment: 2 - 3 drinks a week     Drug use: No      Family History:     Family History   Problem Relation Age of Onset     Hypertension Mother      Hyperlipidemia Mother      Depression Other         dad's side     Heart Disease Maternal Grandfather      Diabetes No family hx of      Coronary Artery Disease No family hx of      Cerebrovascular Disease No family hx of      Breast Cancer No family hx of      Colon Cancer No family hx of      Prostate Cancer No family hx of       Thyroid Disease No family hx of      Glaucoma No family hx of      Macular Degeneration No family hx of       Medications:     Current Outpatient Medications   Medication Sig     fluticasone (FLONASE) 50 MCG/ACT nasal spray Spray 1 spray into both nostrils daily     omeprazole (PRILOSEC) 10 MG capsule Take by mouth 30-60 minutes before a meal.      Allergies:     Allergies   Allergen Reactions     Seasonal Allergies Itching      Review of Systems:   A comprehensive 10 point review of systems (constitutional, ENT, cardiac, peripheral vascular, lymphatic, respiratory, GI, , Musculoskeletal, skin, Neurological) was performed and found to be negative except as described in this note.      Physical Exam:   General: Seated comfortably in no acute distress.  HEENT: Neck supple with normal range of motion.   Skin: No rashes  Neurologic:     Mental Status: Fully alert, attentive and oriented. Speech clear and fluent, no paraphasic errors.     Cranial Nerves: EOMI with normal smooth pursuit. Facial movements symmetric. Hearing not formally tested but intact to conversation.  No dysarthria.     Motor: No tremors or other abnormal movements observed.          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  I indicated that given his lack of progression, and recent improvement, he will likely continue to improve neurologically over time given that on average it can take 10-12 months for post-viral symptoms to resolve.  Given the improvement in symptoms while starting therapy and while on selective serotonin reuptake inhibitor, it is likely that anxiety and stress were likely contributing to some of his symptoms.     Plan:  MyChart message me in 2 months to discuss progression  "or improvement       Follow up in Neurology clinic in as needed should new concerns arise.    MARIELA Wilson D.O.   of Neurology    Greater than 50% of the total time (16 min) in this patient encounter was spent on counseling and/or coordination of care. We reviewed diagnostic results, impressions, and discussed other possible tests if symptoms do not improve. We discussed the implications of the diagnosis, as well as risks and benefits of management options. We reviewed treatment instructions and our scheduled follow-up as specified in the discharge plan. We also discussed the importance of compliance with the chosen course of treatment. The patient is in agreement with this plan and has no further questions.      Rosendo Dallas is a 40 year old male who is being evaluated via a billable video visit.      The patient has been notified of following:     \"This video visit will be conducted via a call between you and your physician/provider. We have found that certain health care needs can be provided without the need for an in-person physical exam.  This service lets us provide the care you need with a video conversation.  If a prescription is necessary we can send it directly to your pharmacy.  If lab work is needed we can place an order for that and you can then stop by our lab to have the test done at a later time.    Video visits are billed at different rates depending on your insurance coverage.  Please reach out to your insurance provider with any questions.    If during the course of the call the physician/provider feels a video visit is not appropriate, you will not be charged for this service.\"    Patient has given verbal consent for Video visit? Yes  How would you like to obtain your AVS? MyChart  If you are dropped from the video visit, the video invite should be resent to: Send to e-mail at: [email or web address]  Will anyone else be joining your video visit? No    Video-Visit " Details    Type of service:  Video Visit    Video Start Time: 1pm  Video End Time: 1:21 PM    Originating Location (pt. Location): Home    Distant Location (provider location):  [facility] NEUROLOGY [facility]     Platform used for Video Visit: Norbert Gutierrez

## 2020-12-07 NOTE — PROGRESS NOTES
"Rosendo Dallas is a 40 year old male who is being evaluated via a billable video visit.      The patient has been notified of following:     \"This video visit will be conducted via a call between you and your physician/provider. We have found that certain health care needs can be provided without the need for an in-person physical exam.  This service lets us provide the care you need with a video conversation.  If a prescription is necessary we can send it directly to your pharmacy.  If lab work is needed we can place an order for that and you can then stop by our lab to have the test done at a later time.    Video visits are billed at different rates depending on your insurance coverage.  Please reach out to your insurance provider with any questions.    If during the course of the call the physician/provider feels a video visit is not appropriate, you will not be charged for this service.\"    Patient has given verbal consent for Video visit? Yes  How would you like to obtain your AVS? MyChart  If you are dropped from the video visit, the video invite should be resent to: Send to e-mail at: [email or web address]  Will anyone else be joining your video visit? No        Video-Visit Details    Type of service:  Video Visit    Video Start Time: 1pm  Video End Time: 1:21 PM    Originating Location (pt. Location): Home    Distant Location (provider location):  [facility] NEUROLOGY [facility]     Platform used for Video Visit: Norbert Gutierrez        "

## 2020-12-07 NOTE — PROGRESS NOTES
[facility] Neurology Follow Up Visit - Virtual    Rosendo Dallas MRN# [id number]   Age: 40 year old YOB: 1980     Brief history of symptoms: The patient was initially seen in neurologic consultation on 10/7/2020 for evaluation of muscle twitching. Please see the comprehensive neurologic consultation note from that date in the Epic records for details. The patient has had muscle twitching with soreness throughout the summer of 2020 (fasciculations of chest, biceps, triceps, gastrocnemius) leading to w/up through ER with elevated CK (572) and Striated carmelo testing showing minimal elevation (1:40).  EMG on 9/9/2020 was unremarkable.  The patient was seen through inpatient neurology 9/7/2020, with impression being for that of a non-specific myopathic process and repeat EMG, repeat Achy R carmelo were to be done as well as repeat EMG.  Prior evaluation from orthopedics (9/4/2020) led to recommendations for L-C5-6 trans-foraminal TOLU (based on imaging and exam).  Upon exam with myself, there was no discernable weakness or sensory loss but there was some diminished reflexes on right patellar and right biceps.  Further testing with Myositis panel, myasthenia panel, repeat CK, and aldolase was to be done along with repeat EMG.    EMG on 10/22/2020 was normal.  There was no evidence for R-C5 or C6 radiculopathy.  CK was 141, Aldolase was 3.9 (both normal) on 10/7/2020  Polymyositis and dermatomyositis panel was negative (10/7/2020)  Other testing was normal (acetylcholine receptor blocking, modulation, binding. Striated muscle antibody)    Ultimately it was thought that the patient likely had some degree of a post-viral related symptomology, and he was to follow up as needed given his negative testing for muscle fasciculations and soreness.  The patient did report still having fasciculations as well as soreness of the muscles on 12/01/2020 so I advised him to have follow up to discuss possible treatment for some of these  symptoms.    Interval history: The patient has focused on stretching and daily massage, as well as seeing a therapist one month ago while starting an selective serotonin reuptake inhibitor.  In the last 5-6 days, his soreness and pain points have been improved.  He is still getting twitching that is worse on some days versus others.  He will keep a journal to better find when his symptoms are coming on and if he can regulate them.        Past Medical History:     Patient Active Problem List   Diagnosis     Vitamin D deficiency     Anxiety     Major depressive disorder, single episode, moderate (H)     Gilbert syndrome     IBS (irritable bowel syndrome)     Past Medical History:   Diagnosis Date     Head injury 15 + years ago    Likely had 1 to 2 concussions in HS and college athletics     IBS (irritable colon syndrome)       Past Surgical History:     Past Surgical History:   Procedure Laterality Date     COLONOSCOPY  3 years ago    No concerns     ORTHOPEDIC SURGERY  04/18    Mensicus / ACL surgery      Social History:     Tobacco Use     Smoking status: Never Smoker     Smokeless tobacco: Never Used   Substance Use Topics     Alcohol use: Yes     Comment: 2 - 3 drinks a week     Drug use: No      Family History:     Family History   Problem Relation Age of Onset     Hypertension Mother      Hyperlipidemia Mother      Depression Other         dad's side     Heart Disease Maternal Grandfather      Diabetes No family hx of      Coronary Artery Disease No family hx of      Cerebrovascular Disease No family hx of      Breast Cancer No family hx of      Colon Cancer No family hx of      Prostate Cancer No family hx of      Thyroid Disease No family hx of      Glaucoma No family hx of      Macular Degeneration No family hx of       Medications:     Current Outpatient Medications   Medication Sig     fluticasone (FLONASE) 50 MCG/ACT nasal spray Spray 1 spray into both nostrils daily     omeprazole (PRILOSEC) 10 MG capsule  Take by mouth 30-60 minutes before a meal.      Allergies:     Allergies   Allergen Reactions     Seasonal Allergies Itching      Review of Systems:   A comprehensive 10 point review of systems (constitutional, ENT, cardiac, peripheral vascular, lymphatic, respiratory, GI, , Musculoskeletal, skin, Neurological) was performed and found to be negative except as described in this note.      Physical Exam:   General: Seated comfortably in no acute distress.  HEENT: Neck supple with normal range of motion.   Skin: No rashes  Neurologic:     Mental Status: Fully alert, attentive and oriented. Speech clear and fluent, no paraphasic errors.     Cranial Nerves: EOMI with normal smooth pursuit. Facial movements symmetric. Hearing not formally tested but intact to conversation.  No dysarthria.     Motor: No tremors or other abnormal movements observed.          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  I indicated that given his lack of progression, and recent improvement, he will likely continue to improve neurologically over time given that on average it can take 10-12 months for post-viral symptoms to resolve.  Given the improvement in symptoms while starting therapy and while on selective serotonin reuptake inhibitor, it is likely that anxiety and stress were likely contributing to some of his symptoms.     Plan:  MyChart message me in 2 months to discuss progression or improvement       Follow up in Neurology clinic in as needed should new concerns arise.    MARIELA Wilson D.O.   of Neurology      Greater than 50% of the total time (16 min) in this patient encounter was spent on counseling and/or coordination of care. We reviewed diagnostic  results, impressions, and discussed other possible tests if symptoms do not improve. We discussed the implications of the diagnosis, as well as risks and benefits of management options. We reviewed treatment instructions and our scheduled follow-up as specified in the discharge plan. We also discussed the importance of compliance with the chosen course of treatment. The patient is in agreement with this plan and has no further questions.

## 2020-12-08 ENCOUNTER — ANCILLARY PROCEDURE (OUTPATIENT)
Dept: CT IMAGING | Facility: CLINIC | Age: 40
End: 2020-12-08
Attending: INTERNAL MEDICINE
Payer: COMMERCIAL

## 2020-12-08 DIAGNOSIS — R06.09 DYSPNEA ON EXERTION: ICD-10-CM

## 2020-12-08 PROCEDURE — 71250 CT THORAX DX C-: CPT | Mod: GC | Performed by: RADIOLOGY

## 2020-12-16 ENCOUNTER — TELEPHONE (OUTPATIENT)
Dept: FAMILY MEDICINE | Facility: CLINIC | Age: 40
End: 2020-12-16

## 2020-12-16 NOTE — TELEPHONE ENCOUNTER
Needs of attention regarding:  -Asthma    Health Maintenance Topics with due status: Overdue       Topic Date Due    PREVENTIVE CARE VISIT 1980    Pneumococcal Vaccine: Pediatrics (0 to 5 Years) and At-Risk Patients (6 to 64 Years) 11/17/1986    HEPATITIS C SCREENING 11/17/1998    INFLUENZA VACCINE 09/01/2020    ASTHMA ACTION PLAN 09/30/2020    PHQ-9 11/01/2020    ASTHMA CONTROL TEST 11/18/2020       Communication:  See MyChart message

## 2020-12-27 ENCOUNTER — HEALTH MAINTENANCE LETTER (OUTPATIENT)
Age: 40
End: 2020-12-27

## 2021-04-24 ENCOUNTER — HEALTH MAINTENANCE LETTER (OUTPATIENT)
Age: 41
End: 2021-04-24

## 2021-09-23 NOTE — Clinical Note
----- Message from Holger Wade sent at 9/23/2021 11:31 AM EDT -----  Regarding: /Telephone  Contact: [phone]  Patient return call    Caller's first and last name and relationship (if not the patient): N/A      Best contact number(s): [phone]      Whose call is being returned: Agustin Lawton      Details to clarify the request: Patient was retuning phone call regarding blood work results.       Holger Wade Empty sella syndrome with new onset HA behind the eyes x 2 weeks. Possible blurred margin left eye, but OCT looks good and no other eye symptoms. Getting MRI today, scheduled with neuro-ophth clinic 1 month. If needing to be seen sooner please contact pt.

## 2021-10-04 ENCOUNTER — IMMUNIZATION (OUTPATIENT)
Dept: NURSING | Facility: CLINIC | Age: 41
End: 2021-10-04
Payer: COMMERCIAL

## 2021-10-04 DIAGNOSIS — Z23 ENCOUNTER FOR IMMUNIZATION: Primary | ICD-10-CM

## 2021-10-04 PROCEDURE — 0001A PR COVID VAC PFIZER DIL RECON 30 MCG/0.3 ML IM: CPT

## 2021-10-04 PROCEDURE — 91300 PR COVID VAC PFIZER DIL RECON 30 MCG/0.3 ML IM: CPT

## 2021-10-09 ENCOUNTER — HEALTH MAINTENANCE LETTER (OUTPATIENT)
Age: 41
End: 2021-10-09

## 2021-10-25 ENCOUNTER — IMMUNIZATION (OUTPATIENT)
Dept: PEDIATRICS | Facility: CLINIC | Age: 41
End: 2021-10-25
Payer: COMMERCIAL

## 2021-10-25 DIAGNOSIS — Z23 NEED FOR PROPHYLACTIC VACCINATION AND INOCULATION AGAINST INFLUENZA: Primary | ICD-10-CM

## 2021-10-25 PROCEDURE — 90471 IMMUNIZATION ADMIN: CPT

## 2021-10-25 PROCEDURE — 90686 IIV4 VACC NO PRSV 0.5 ML IM: CPT

## 2022-04-18 PROCEDURE — 88305 TISSUE EXAM BY PATHOLOGIST: CPT | Performed by: PATHOLOGY

## 2022-04-19 ENCOUNTER — LAB REQUISITION (OUTPATIENT)
Dept: LAB | Facility: CLINIC | Age: 42
End: 2022-04-19

## 2022-04-19 DIAGNOSIS — R12 HEARTBURN: ICD-10-CM

## 2022-04-19 DIAGNOSIS — K31.89 OTHER DISEASES OF STOMACH AND DUODENUM: ICD-10-CM

## 2022-04-21 LAB
PATH REPORT.COMMENTS IMP SPEC: NORMAL
PATH REPORT.COMMENTS IMP SPEC: NORMAL
PATH REPORT.FINAL DX SPEC: NORMAL
PATH REPORT.GROSS SPEC: NORMAL
PATH REPORT.MICROSCOPIC SPEC OTHER STN: NORMAL
PATH REPORT.RELEVANT HX SPEC: NORMAL
PHOTO IMAGE: NORMAL

## 2022-05-16 ENCOUNTER — HEALTH MAINTENANCE LETTER (OUTPATIENT)
Age: 42
End: 2022-05-16

## 2022-08-11 ENCOUNTER — TRANSFERRED RECORDS (OUTPATIENT)
Dept: HEALTH INFORMATION MANAGEMENT | Facility: CLINIC | Age: 42
End: 2022-08-11

## 2022-09-11 ENCOUNTER — HEALTH MAINTENANCE LETTER (OUTPATIENT)
Age: 42
End: 2022-09-11

## 2022-10-26 ENCOUNTER — TRANSFERRED RECORDS (OUTPATIENT)
Dept: HEALTH INFORMATION MANAGEMENT | Facility: CLINIC | Age: 42
End: 2022-10-26

## 2023-02-15 ENCOUNTER — OFFICE VISIT (OUTPATIENT)
Dept: NEUROLOGY | Facility: CLINIC | Age: 43
End: 2023-02-15
Payer: COMMERCIAL

## 2023-02-15 ENCOUNTER — TELEPHONE (OUTPATIENT)
Dept: NEUROLOGY | Facility: CLINIC | Age: 43
End: 2023-02-15

## 2023-02-15 VITALS
SYSTOLIC BLOOD PRESSURE: 142 MMHG | OXYGEN SATURATION: 97 % | BODY MASS INDEX: 28.46 KG/M2 | HEART RATE: 89 BPM | DIASTOLIC BLOOD PRESSURE: 92 MMHG | WEIGHT: 215.7 LBS

## 2023-02-15 DIAGNOSIS — R25.3 FASCICULATION: Primary | ICD-10-CM

## 2023-02-15 PROCEDURE — 99214 OFFICE O/P EST MOD 30 MIN: CPT | Performed by: PSYCHIATRY & NEUROLOGY

## 2023-02-15 ASSESSMENT — PAIN SCALES - GENERAL: PAINLEVEL: MODERATE PAIN (4)

## 2023-02-15 NOTE — PROGRESS NOTES
[facility] Neurology Follow Up Visit    Rosendo Dallas MRN# [id number]   Age: 42 year old YOB: 1980     Brief history of symptoms: The patient was initially seen in neurologic consultation on 10/7/2020 and most recently 12/7/2020 for evaluation of post viral related issues with muscle twitching. Please see the comprehensive neurologic consultation notes from those dates in the Epic records for details.     Muscle twitching was reported in varied areas since the summer of 2020, with prior EMG 9/9/2020 being normal, 10/22/2020 EMG being normal without evidence for cervical radiculopathy, CK/aldolast being normal 10/7/2020, and normal paraneoplastic/dermaotomyositis-polymyositis panel 10/7/2020. MRI cervical spine 9/2/2020 did show severe left C5-6, and b/l C6-7 neuro foraminal stenosis along with other moderate neuroforaminal stenosis at C3-4 and C6-7. His aching and twitching improved mildly with use of an selective serotonin reuptake inhibitor.  He was to follow up as needed given his lack of pertinent neurological findings overall, and it was thought that his symptoms were from a combination of a post-viral syndrome along with anxiety/stress.    Today, the patient reports that most of his symptoms have resolved. He has had individual muscle twitching and spasms at times, but this was much less than it was at our initial visits.  About 3 weeks ago, he developed a worsening of his prior symptoms (soreness in elbows/arms, muscle twitching) and increased salvia production (30 - 40 minutes after eating).  Upped his omeprazole, which has improved his saliva production. He did start a new job on the 1st .  He also stopped his selective serotonin reuptake inhibitor, and has stopped following with psychiatry since 9502-2756.     Physical Exam:   Vitals: BP (!) 142/92 (BP Location: Right arm, Patient Position: Sitting, Cuff Size: Adult Large)   Pulse 89   Wt 97.8 kg (215 lb 11.2 oz)   SpO2 97%   BMI 28.46 kg/m      General: Seated comfortably in no acute distress.  Neurologic:     Mental Status: Fully alert, attentive and oriented. Speech clear and fluent, no paraphasic errors.     Cranial Nerves: EOMI with normal smooth pursuit.  No dysarthria.     Motor: No tremors or other abnormal movements observed.      Sensory: No errors with vibration (+10 seconds b/l at MM). No errors with soft-touch and pain differentiation in hands, arms, feet, and legs b/l.      DTR: mildly reduced biceps on left (1+) but remaining reflexes (brachioradialis, triceps, patellar, achilles) are 2+ symmetric.     Gait: Normal, steady casual gait.         Assessment and Plan:   Assessment:  Fasciculations, generalized    The patient has had increased stress from work at the same time as his onset of fasciculations and twitching has reoccurred.  I suspect his saliva production is GERD related given his description.  Overall, his exam is reassuring for a lack of radicular findings of sensory loss, or motor weakness.  I think restarting his antidepressant may be helpful, given his reports of anxiety/stress and given his response in the past to it.  If he cannot see his prior psychiatrist within a reasonable time, then I would restart it for him with plans that he follow with them going forward for management.  Otherwise, the patient will return for follow up and at that time, if his anxiety/stress is reduced and he still has the fasciculations, then consideration toward gabapentin or carbamazepine could be made for cramp fasciculations could be made.     Plan:  Follow up in Neurology clinic in 6 months, or earlier as needed should new concerns arise.    MARIELA Wilson D.O.   of Neurology    Total time today (30 min) in this patient encounter was spent on pre-charting, counseling and/or coordination of care.

## 2023-02-15 NOTE — LETTER
2/15/2023       RE: Rosendo Dallas  [address]  [place] [address]     Dear Colleague,    Thank you for referring your patient, Rosendo Dallas, to the [facility] [place] at . Please see a copy of my visit note below.    [facility] Neurology Follow Up Visit    Rosendo aDllas MRN# [id number]   Age: 42 year old YOB: 1980     Brief history of symptoms: The patient was initially seen in neurologic consultation on 10/7/2020 and most recently 12/7/2020 for evaluation of post viral related issues with muscle twitching. Please see the comprehensive neurologic consultation notes from those dates in the Epic records for details.     Muscle twitching was reported in varied areas since the summer of 2020, with prior EMG 9/9/2020 being normal, 10/22/2020 EMG being normal without evidence for cervical radiculopathy, CK/aldolast being normal 10/7/2020, and normal paraneoplastic/dermaotomyositis-polymyositis panel 10/7/2020. MRI cervical spine 9/2/2020 did show severe left C5-6, and b/l C6-7 neuro foraminal stenosis along with other moderate neuroforaminal stenosis at C3-4 and C6-7. His aching and twitching improved mildly with use of an selective serotonin reuptake inhibitor.  He was to follow up as needed given his lack of pertinent neurological findings overall, and it was thought that his symptoms were from a combination of a post-viral syndrome along with anxiety/stress.    Today, the patient reports that most of his symptoms have resolved. He has had individual muscle twitching and spasms at times, but this was much less than it was at our initial visits.  About 3 weeks ago, he developed a worsening of his prior symptoms (soreness in elbows/arms, muscle twitching) and increased salvia production (30 - 40 minutes after eating).  Upped his omeprazole, which has improved his saliva production. He did start a new job on the 1st  .  He also stopped his selective serotonin reuptake inhibitor, and has stopped following with psychiatry since [id number].     Physical Exam:   Vitals: BP (!) 142/92 (BP Location: Right arm, Patient Position: Sitting, Cuff Size: Adult Large)   Pulse 89   Wt 97.8 kg (215 lb 11.2 oz)   SpO2 97%   BMI 28.46 kg/m     General: Seated comfortably in no acute distress.  Neurologic:     Mental Status: Fully alert, attentive and oriented. Speech clear and fluent, no paraphasic errors.     Cranial Nerves: EOMI with normal smooth pursuit.  No dysarthria.     Motor: No tremors or other abnormal movements observed.      Sensory: No errors with vibration (+10 seconds b/l at MM). No errors with soft-touch and pain differentiation in hands, arms, feet, and legs b/l.      DTR: mildly reduced biceps on left (1+) but remaining reflexes (brachioradialis, triceps, patellar, achilles) are 2+ symmetric.     Gait: Normal, steady casual gait.         Assessment and Plan:   Assessment:  Fasciculations, generalized    The patient has had increased stress from work at the same time as his onset of fasciculations and twitching has reoccurred.  I suspect his saliva production is GERD related given his description.  Overall, his exam is reassuring for a lack of radicular findings of sensory loss, or motor weakness.  I think restarting his antidepressant may be helpful, given his reports of anxiety/stress and given his response in the past to it.  If he cannot see his prior psychiatrist within a reasonable time, then I would restart it for him with plans that he follow with them going forward for management.  Otherwise, the patient will return for follow up and at that time, if his anxiety/stress is reduced and he still has the fasciculations, then consideration toward gabapentin or carbamazepine could be made for cramp fasciculations could be made.     Plan:  Follow up in Neurology clinic in 6 months, or earlier as needed should new concerns  gus Wilson D.O.   of Neurology    Total time today (30 min) in this patient encounter was spent on pre-charting, counseling and/or coordination of care.

## 2023-02-15 NOTE — TELEPHONE ENCOUNTER
Disposition: Return in 6 months (on 8/15/2023) for Follow up, using a video visit, with me.  [CER [id number]]    MARKOS.  Ab Medina on 2/15/2023 at 4:38 PM

## 2023-06-03 ENCOUNTER — HEALTH MAINTENANCE LETTER (OUTPATIENT)
Age: 43
End: 2023-06-03

## 2023-06-19 ENCOUNTER — TELEPHONE (OUTPATIENT)
Dept: UROLOGY | Facility: CLINIC | Age: 43
End: 2023-06-19
Payer: COMMERCIAL

## 2023-06-19 NOTE — TELEPHONE ENCOUNTER
Called waitlist patient and offered an appointment with Dr. Krause on this date 6/20/2023 at either 8 am or 8:30 Am at this location- [place] for virtual consult. Provided [phone] to call and reschedule if available.      Please note there is no guarantee this appointment will be available as it is first come first serve.    Simi green Procedure   Dermatology, Surgery, Urology  [facility]- [place]

## 2023-08-01 ENCOUNTER — VIRTUAL VISIT (OUTPATIENT)
Dept: UROLOGY | Facility: CLINIC | Age: 43
End: 2023-08-01
Payer: COMMERCIAL

## 2023-08-01 DIAGNOSIS — Z30.09 VASECTOMY EVALUATION: Primary | ICD-10-CM

## 2023-08-01 PROCEDURE — 99202 OFFICE O/P NEW SF 15 MIN: CPT | Mod: VID | Performed by: UROLOGY

## 2023-08-01 NOTE — NURSING NOTE
Is the patient currently in the state of [place]? YES    Visit mode:VIDEO    If the visit is dropped, the patient can be reconnected by: VIDEO VISIT: Text to cell phone: [phone]    Will anyone else be joining the visit? NO      How would you like to obtain your AVS? MyChart    Are changes needed to the allergy or medication list? NO    Reason for visit: Consult (Vasectomy consult )

## 2023-08-01 NOTE — PROGRESS NOTES
VASECTOMY CONSULTATION NOTE  DATE OF VISIT: 8/1/2023  DAVID MACDONALD   PATIENT NAME: Rosendo Dallas    YOB: 1980      REASON FOR CONSULTATION: Mr. Rosendo Dallas is a 42 year old year old gentleman who is seen today requesting a vasectomy. He has 2 children - 13 year old boy and 10 year old girl - and he wishes to have a vasectomy for birth control. His wife is in agreement with this plan.     PAST MEDICAL HISTORY:   Past Medical History:   Diagnosis Date    Head injury 15 + years ago    Likely had 1 to 2 concussions in  and college athletics    IBS (irritable colon syndrome)        PAST SURGICAL HISTORY:   Past Surgical History:   Procedure Laterality Date    COLONOSCOPY  3 years ago    No concerns    ORTHOPEDIC SURGERY  04/18    Mensicus / ACL surgery       MEDICATIONS:   Current Outpatient Medications:     fluticasone (FLONASE) 50 MCG/ACT nasal spray, Spray 1 spray into both nostrils daily (Patient not taking: Reported on 2/15/2023), Disp: , Rfl:     omeprazole (PRILOSEC OTC) 20 MG EC tablet, Take 20 mg by mouth, Disp: , Rfl:     omeprazole (PRILOSEC) 10 MG capsule, Take by mouth 30-60 minutes before a meal. (Patient not taking: Reported on 2/15/2023), Disp: 60 capsule, Rfl:     sertraline (ZOLOFT) 50 MG tablet, , Disp: , Rfl:     ALLERGIES:   Allergies   Allergen Reactions    Seasonal Allergies Itching       FAMILY HISTORY:   Family History   Problem Relation Age of Onset    Hypertension Mother     Hyperlipidemia Mother     Depression Other         dad's side    Heart Disease Maternal Grandfather     Diabetes No family hx of     Coronary Artery Disease No family hx of     Cerebrovascular Disease No family hx of     Breast Cancer No family hx of     Colon Cancer No family hx of     Prostate Cancer No family hx of     Thyroid Disease No family hx of     Glaucoma No family hx of     Macular Degeneration No family hx of        SOCIAL HISTORY:   Social History     Socioeconomic History    Marital  status:      Spouse name: Not on file    Number of children: Not on file    Years of education: Not on file    Highest education level: Not on file   Occupational History    Not on file   Tobacco Use    Smoking status: Never    Smokeless tobacco: Never   Substance and Sexual Activity    Alcohol use: Yes     Comment: 2 - 3 drinks a week    Drug use: No    Sexual activity: Yes     Partners: Female   Other Topics Concern    Parent/sibling w/ CABG, MI or angioplasty before 65F 55M? No   Social History Narrative    Not on file     Social Determinants of Health     Financial Resource Strain: Not on file   Food Insecurity: Not on file   Transportation Needs: Not on file   Physical Activity: Not on file   Stress: Not on file   Social Connections: Not on file   Intimate Partner Violence: Not on file   Housing Stability: Not on file       PHYSICAL EXAM  Patient is a 42 year old  male   Vitals: There were no vitals taken for this visit.  There is no height or weight on file to calculate BMI.  General Appearance Adult:   Alert, no acute distress, oriented  HENT: throat/mouth:normal, good dentition  Lungs: no respiratory distress, or pursed lip breathing  Heart: No obvious jugular venous distension present  Abdomen: non - distended  Musculoskeltal: extremities normal, no peripheral edema  Skin: no suspicious lesions or rashes  Neuro: Alert, oriented, speech and mentation normal  Psych: affect and mood normal  Gait: Normal     DIAGNOSIS: Request for sterilization    PLAN: The risks of the procedure as well as expectations for recovery and outcomes were explained in detail to him.  He was counseled on the risks for bleeding infection and pain after the procedure. We discussed the risk of post-vasectomy pain syndrome.  He was instructed to continue to use contraception until he had proven azoospermia on a semen specimen.  This would normally be collected at least 3 months after the procedure. Also discussed the rare, but  possible risk of re-canalization of the vas, even after successful vasectomy with sterile semen specimen.  He was instructed to hold all anticoagulants medications for one week prior to the procedure.  It was recommended that he have someone else drive him home after his vasectomy.  In light of these risks and expectations he would like to proceed.  We are scheduling a vasectomy in the office in the near future.    Pt. Understands:  -1/1000-1/3000 risk of future pregnancy even with perfectly done vasectomy  -vasectomy is a permanent procedure    -he may cryopreserve sperm if he wishes   -1-5% risk of post-vasectomy pain syndrome   -1-5% risk of complication, primarily infection or bleeding  - he needs to have a semen sample that shows no sperm before getting approval for unprotected intercourse.      Thank you for the kind consultation.    Time spent: 15 minutes spent on the date of the encounter doing chart review, history and exam, documentation and further activities as noted above.     Alfredo Krause MD   Urology  [facility]  Clinic Phone [phone]          Virtual Visit Details    Type of service:  Video Visit     Start time: 12:36 PM     End time: 12:43 PM     Originating Location (pt. Location): Home    Distant Location (provider location):  On-site  Platform used for Video Visit: Norbert

## 2023-08-02 ENCOUNTER — TELEPHONE (OUTPATIENT)
Dept: UROLOGY | Facility: CLINIC | Age: 43
End: 2023-08-02
Payer: COMMERCIAL

## 2023-08-02 NOTE — TELEPHONE ENCOUNTER
Left Voicemail (1st Attempt) for the patient to call back and schedule the following:    Appointment type: Vasectomy  Provider: Dr. Krause  Return date: Next available  Specialty phone number: [phone]  Additonal Notes: Consult completed 8/1. Please schedule Vasectomy in clinic.      Simi green Procedure   Dermatology, Surgery, Urology  [facility]

## 2023-08-07 NOTE — TELEPHONE ENCOUNTER
Left Voicemail (2nd Attempt) for the patient to call back and schedule the following:    Appointment type: Vasectomy  Provider: Dr. Krause  Return date: Next available  Specialty phone number: [phone]  Additonal Notes: Consult completed 8/1. Please schedule Vasectomy in clinic.      Melissa green Procedure   Orthopedics, Podiatry, Sports Medicine, Ent ,Eye , Audiology, Adult Endocrine & Diabetes, Nutrition & Medication Therapy Management Specialties   [facility]

## 2024-07-13 ENCOUNTER — HEALTH MAINTENANCE LETTER (OUTPATIENT)
Age: 44
End: 2024-07-13

## 2024-07-23 NOTE — PROGRESS NOTES
SUBJECTIVE:   Rosendo Dallas is a 37 year old male who presents to clinic today for the following health issues:      Headache  Onset: x 3 weeks     Description:   Location: unilateral in the left frontal area   Character: dull pain  Frequency:  daily  Duration:  2-3 hours    Intensity: moderate    Progression of Symptoms:  worsening    Accompanying Signs & Symptoms:  Stiff neck: YES  Neck or upper back pain: YES- neck pain  Fever: no  Sinus pressure: no  Nausea or vomiting: YES- nausea  Dizziness: no  Numbness: no  Weakness: no  Visual changes: no     History:   Head trauma: no  Family history of migraines: no  Previous tests for headaches: no  Neurologist evaluations: no  Able to do daily activities: YES- inhibits some activities  Wake with a headaches: YES  Do headaches wake you up: YES  Daily pain medication use: YES  Work/school stressors/changes: no    Precipitating factors:   Does light make it worse: no  Does sound make it worse: no    Alleviating factors:  Does sleep help: no    Therapies Tried and outcome: Ibuprofen (Advil, Motrin) and Tylenol      Current Medications:     Current Outpatient Prescriptions   Medication Sig Dispense Refill     escitalopram (LEXAPRO) 10 MG tablet Take 1 tablet (10 mg) by mouth daily 90 tablet 1     omeprazole (PRILOSEC) 10 MG capsule Take by mouth 30-60 minutes before a meal. 60 capsule      albuterol (PROAIR HFA/PROVENTIL HFA/VENTOLIN HFA) 108 (90 Base) MCG/ACT Inhaler Inhale 2 puffs into the lungs every 4 hours as needed for shortness of breath / dyspnea or wheezing (Patient not taking: Reported on 7/12/2018) 1 Inhaler 0     fluticasone (FLONASE) 50 MCG/ACT spray Spray 2 sprays into both nostrils daily (Patient not taking: Reported on 7/12/2018) 16 g 0         Allergies:    No Known Allergies         Past Medical History:     Past Medical History:   Diagnosis Date     IBS (irritable colon syndrome)          Past Surgical History:   No past surgical history on  "file.      Family Medical History:     Family History   Problem Relation Age of Onset     Hypertension Mother      Hyperlipidemia Mother      Depression Other      dad's side     Diabetes No family hx of      Coronary Artery Disease No family hx of      Cerebrovascular Disease No family hx of      Breast Cancer No family hx of      Colon Cancer No family hx of      Prostate Cancer No family hx of      Thyroid Disease No family hx of          Social History:     Social History     Social History     Marital status:      Spouse name: N/A     Number of children: N/A     Years of education: N/A     Occupational History     Not on file.     Social History Main Topics     Smoking status: Never Smoker     Smokeless tobacco: Never Used     Alcohol use Yes      Comment: occassional     Drug use: No     Sexual activity: Yes     Partners: Female     Other Topics Concern     Not on file     Social History Narrative           Review of System:     Constitutional: Negative for fever or chills  Skin: Negative for rashes  Ears/Nose/Throat: Negative for nasal congestion, sore throat  Respiratory: No shortness of breath, dyspnea on exertion, cough, or hemoptysis  Cardiovascular: Negative for chest pain  Gastrointestinal: Negative for nausea, vomiting  Genitourinary: Negative for dysuria, hematuria  Musculoskeletal: Negative for myalgias  Neurologic: Positive for headaches  Psychiatric: Negative for depression, anxiety  Hematologic/Lymphatic/Immunologic: Negative  Endocrine: Negative  Behavioral: Negative for tobacco use       Physical Exam:   /82 (BP Location: Left arm, Patient Position: Sitting, Cuff Size: Adult Large)  Pulse 76  Temp 98  F (36.7  C) (Oral)  Ht 6' 1\" (1.854 m)  Wt 209 lb 14.4 oz (95.2 kg)  SpO2 97%  BMI 27.69 kg/m2    GENERAL: alert and no distress  EYES: eyes grossly normal to inspection, and conjunctivae and sclerae normal  HENT: Normocephalic atraumatic. Nose and mouth without ulcers or " pacu / [place] lesions  NECK: supple  RESP: lungs clear to auscultation   CV: regular rate and rhythm, normal S1 S2  LYMPH: no peripheral edema   ABDOMEN: nondistended  MS: no gross musculoskeletal defects noted  SKIN: no suspicious lesions or rashes  NEURO: Alert & Oriented x 3. No focal neurological deficits  PSYCH: mentation appears normal, affect normal        Diagnostic Test Results:      I have ordered MR Brain w/o & w Contrast, MRA Head w/o Contrast Angiogram to screen for intracranial abnormalities including brain tumor and brain aneurysm, which are pending at this time.    ASSESSMENT/PLAN:       (R51) Nonintractable episodic headache, unspecified headache type  (primary encounter diagnosis)  Comment: recent new headaches of unclear etiology. Patient denies any known history of chronic headaches including migraines in the past.  Plan: I have ordered MR Brain w/o & w Contrast, MRA Head w/o Contrast Angiogram to screen for intracranial abnormalities including brain tumor and brain aneurysm. In the meantime, I have also ordered NEUROLOGY ADULT REFERRAL for further evaluation and management of his headaches going forward.      Follow Up Plan:     Patient is instructed to return to Internal Medicine clinic for follow-up visit in 1 day to review MR studies results.        Elena Apple MD  Internal Medicine  [facility]

## 2025-07-19 ENCOUNTER — HEALTH MAINTENANCE LETTER (OUTPATIENT)
Age: 45
End: 2025-07-19